# Patient Record
Sex: FEMALE | Race: WHITE | NOT HISPANIC OR LATINO | Employment: OTHER | URBAN - METROPOLITAN AREA
[De-identification: names, ages, dates, MRNs, and addresses within clinical notes are randomized per-mention and may not be internally consistent; named-entity substitution may affect disease eponyms.]

---

## 2017-10-03 ENCOUNTER — OFFICE VISIT (OUTPATIENT)
Dept: FAMILY MEDICINE | Facility: CLINIC | Age: 67
End: 2017-10-03
Payer: MEDICARE

## 2017-10-03 VITALS
DIASTOLIC BLOOD PRESSURE: 80 MMHG | BODY MASS INDEX: 26.45 KG/M2 | SYSTOLIC BLOOD PRESSURE: 108 MMHG | HEART RATE: 78 BPM | TEMPERATURE: 98 F | WEIGHT: 149.25 LBS | HEIGHT: 63 IN | OXYGEN SATURATION: 97 %

## 2017-10-03 DIAGNOSIS — E78.00 PURE HYPERCHOLESTEROLEMIA: ICD-10-CM

## 2017-10-03 DIAGNOSIS — M19.90 ARTHRITIS: ICD-10-CM

## 2017-10-03 DIAGNOSIS — Z12.11 COLON CANCER SCREENING: ICD-10-CM

## 2017-10-03 DIAGNOSIS — K21.00 GERD WITH ESOPHAGITIS: ICD-10-CM

## 2017-10-03 DIAGNOSIS — J30.2 CHRONIC SEASONAL ALLERGIC RHINITIS DUE TO OTHER ALLERGEN: ICD-10-CM

## 2017-10-03 DIAGNOSIS — E55.9 VITAMIN D DEFICIENCY: ICD-10-CM

## 2017-10-03 DIAGNOSIS — E05.90 HYPERTHYROIDISM: Primary | ICD-10-CM

## 2017-10-03 DIAGNOSIS — Z12.31 ENCOUNTER FOR SCREENING MAMMOGRAM FOR BREAST CANCER: ICD-10-CM

## 2017-10-03 PROCEDURE — 99999 PR PBB SHADOW E&M-NEW PATIENT-LVL III: CPT | Mod: PBBFAC,,, | Performed by: FAMILY MEDICINE

## 2017-10-03 PROCEDURE — 99203 OFFICE O/P NEW LOW 30 MIN: CPT | Mod: S$GLB,,, | Performed by: FAMILY MEDICINE

## 2017-10-03 RX ORDER — PANTOPRAZOLE SODIUM 40 MG/1
40 TABLET, DELAYED RELEASE ORAL DAILY
Qty: 90 TABLET | Refills: 4 | Status: ON HOLD | OUTPATIENT
Start: 2017-10-03 | End: 2018-05-04

## 2017-10-03 RX ORDER — PANTOPRAZOLE SODIUM 40 MG/1
40 TABLET, DELAYED RELEASE ORAL DAILY
COMMUNITY
End: 2017-10-03 | Stop reason: SDUPTHER

## 2017-10-03 RX ORDER — AMOXICILLIN AND CLAVULANATE POTASSIUM 875; 125 MG/1; MG/1
TABLET, FILM COATED ORAL
COMMUNITY
Start: 2017-09-05 | End: 2017-10-03

## 2017-10-03 RX ORDER — FLUTICASONE PROPIONATE 50 MCG
SPRAY, SUSPENSION (ML) NASAL
COMMUNITY
Start: 2017-09-05 | End: 2017-10-03

## 2017-10-03 RX ORDER — CALCIUM CARBONATE/VITAMIN D3 500-10/5ML
1 LIQUID (ML) ORAL ONCE
COMMUNITY

## 2017-10-03 RX ORDER — METHIMAZOLE 10 MG/1
5 TABLET ORAL DAILY
COMMUNITY
Start: 2017-09-28 | End: 2017-11-29 | Stop reason: SDUPTHER

## 2017-10-03 RX ORDER — CLOTRIMAZOLE AND BETAMETHASONE DIPROPIONATE 10; .64 MG/G; MG/G
CREAM TOPICAL
COMMUNITY
Start: 2017-09-23 | End: 2017-10-03

## 2017-10-03 RX ORDER — LOVASTATIN 40 MG/1
TABLET ORAL
COMMUNITY
Start: 2017-09-23 | End: 2018-03-20 | Stop reason: SDUPTHER

## 2017-10-03 NOTE — ASSESSMENT & PLAN NOTE
Pt is currently stable on medication regimen.  Continue current therapy as scheduled.  Contact office with any questions about adjustments on medications.   -  Will stay with me to monitor this condition.   -   Previously saw Dr. Mendez

## 2017-10-03 NOTE — PROGRESS NOTES
Office Visit    Patient Name: Meera Britt    : 1950  MRN: [id number]    Subjective:  Meera is a 67 y.o. female who presents today for:    Establish Care and Gastroesophageal Reflux      This patient has multiple medical diagnoses as noted below.  This patient is known to me and to this clinic. She recently completed a Nissen fundoplication in 2016.  She has noted that she has increased green sputum in the back of throat.  She notice this occurred several months ago.  She has increase gas production.  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  She report a foul odor.    We reviewed her past medical history.  Patient denies any new symptoms including chest pain, SOB, blurry vision, N/V, diarrhea.        Patient Active Problem List   Diagnosis    Seasonal allergies    Arthritis    GERD with esophagitis    Hyperthyroidism    Pure hypercholesterolemia    Vitamin D deficiency       Past Surgical History:   Procedure Laterality Date    HYSTERECTOMY      TONSILLECTOMY         History reviewed. No pertinent family history.    Social History     Social History    Marital status: Unknown     Spouse name: N/A    Number of children: N/A    Years of education: N/A     Occupational History    Not on file.     Social History Main Topics    Smoking status: Never Smoker    Smokeless tobacco: Never Used    Alcohol use No    Drug use: Unknown    Sexual activity: Not on file     Other Topics Concern    Not on file     Social History Narrative    No narrative on file       Current Medications  Medications reviewed and updated.     Allergies   Review of patient's allergies indicates:   Allergen Reactions    Iodine and iodide containing products      bloating         Labs  No results found for: LABA1C, HGBA1C  No results found for: NA, K, CL, CO2, BUN, CREATININE, CALCIUM, ANIONGAP, ESTGFRAFRICA, EGFRNONAA  No results found for: CHOL  No results found for: HDL  No results found for: LDLCALC  No results  "found for: TRIG  No results found for: CHOLHDL  Last set of blood work has been reviewed as noted above.    Review of Systems   Constitutional: Negative for activity change, appetite change, fatigue, fever and unexpected weight change.   HENT: Negative.  Negative for ear discharge, ear pain, rhinorrhea and sore throat.    Eyes: Negative.    Respiratory: Negative for apnea, cough, chest tightness, shortness of breath and wheezing.    Cardiovascular: Negative for chest pain, palpitations and leg swelling.   Gastrointestinal: Negative for abdominal distention, abdominal pain, constipation, diarrhea and vomiting.        Bloating and increased flatulance    Endocrine: Negative for cold intolerance, heat intolerance, polydipsia and polyuria.   Genitourinary: Negative for decreased urine volume, menstrual problem, urgency, vaginal bleeding, vaginal discharge and vaginal pain.   Musculoskeletal: Negative.    Skin: Negative for rash.   Neurological: Negative for dizziness and headaches.   Hematological: Does not bruise/bleed easily.   Psychiatric/Behavioral: Negative for agitation, sleep disturbance and suicidal ideas.       /80 (BP Location: Left arm, Patient Position: Sitting, BP Method: Large (Manual))   Pulse 78   Temp 98.3 °F (36.8 °C) (Oral)   Ht 5' 3" (1.6 m)   Wt 67.7 kg (149 lb 4 oz)   SpO2 97%   BMI 26.44 kg/m²      Physical Exam   Constitutional: She is oriented to person, place, and time. She appears well-developed and well-nourished.   HENT:   Head: Normocephalic.   Right Ear: External ear normal.   Left Ear: External ear normal.   Nose: Nose normal.   Mouth/Throat: Oropharynx is clear and moist.   Eyes: Conjunctivae and EOM are normal. Pupils are equal, round, and reactive to light.   Cardiovascular: Normal rate, regular rhythm and normal heart sounds.    Pulmonary/Chest: Effort normal and breath sounds normal.   Neurological: She is alert and oriented to person, place, and time.   Skin: Skin is " warm and dry.   Vitals reviewed.      Health Maintenance  Health Maintenance    Patient has no pending health maintenance at this time         Assessment/Plan:  Meera Britt is a 67 y.o. female who presents today for :    1. Hyperthyroidism    2. Chronic seasonal allergic rhinitis due to other allergen    3. Arthritis    4. GERD with esophagitis    5. Pure hypercholesterolemia    6. Colon cancer screening    7. Encounter for screening mammogram for breast cancer    8. Vitamin D deficiency        Problem List Items Addressed This Visit        Unprioritized    Arthritis    Current Assessment & Plan     Patient is stable.  Assess and addressed all modifiable risk factors.  Continue with appropriate management to prevent complications.           GERD with esophagitis    Overview     Nissen fundoplication   -  Will follow up with [facility].          Relevant Medications    pantoprazole (PROTONIX) 40 MG tablet    Hyperthyroidism - Primary    Current Assessment & Plan     Pt is currently stable on medication regimen.  Continue current therapy as scheduled.  Contact office with any questions about adjustments on medications.   -  Will stay with me to monitor this condition.   -   Previously saw Dr. Mendez         Relevant Orders    CBC auto differential    Comprehensive metabolic panel    Lipid panel    Hemoglobin A1c    TSH    Vitamin D    TSH    T4, free    Pure hypercholesterolemia    Relevant Orders    CBC auto differential    Comprehensive metabolic panel    Lipid panel    Hemoglobin A1c    TSH    Seasonal allergies    Current Assessment & Plan     Patient is stable.  Assess and addressed all modifiable risk factors.  Continue with appropriate management to prevent complications.  -  gerd contributing to her symptoms.          Vitamin D deficiency    Overview     Cannot tolerate prescription dosage of medication         Current Assessment & Plan     Continue with OTC vitamin d         Relevant Orders     Vitamin D      Other Visit Diagnoses     Colon cancer screening        Relevant Orders    Case request GI: COLONOSCOPY (Completed)    Encounter for screening mammogram for breast cancer        Relevant Orders    Mammo Digital Screening Bilat with CAD          Return in about 6 months (around 4/3/2018).     This note was created by combination of typed  and Dragon dictation.  Transcription errors may be present.  If there are any questions, please contact me.

## 2017-10-03 NOTE — ASSESSMENT & PLAN NOTE
Patient is stable.  Assess and addressed all modifiable risk factors.  Continue with appropriate management to prevent complications.  -  gerd contributing to her symptoms.

## 2017-10-04 ENCOUNTER — HOSPITAL ENCOUNTER (OUTPATIENT)
Dept: RADIOLOGY | Facility: HOSPITAL | Age: 67
Discharge: HOME | End: 2017-10-04
Attending: FAMILY MEDICINE
Payer: MEDICARE

## 2017-10-04 DIAGNOSIS — Z12.31 ENCOUNTER FOR SCREENING MAMMOGRAM FOR BREAST CANCER: ICD-10-CM

## 2017-10-04 PROCEDURE — 77067 SCR MAMMO BI INCL CAD: CPT | Mod: 26,,, | Performed by: RADIOLOGY

## 2017-10-04 PROCEDURE — 77063 BREAST TOMOSYNTHESIS BI: CPT | Mod: 26,,, | Performed by: RADIOLOGY

## 2017-10-04 PROCEDURE — 77067 SCR MAMMO BI INCL CAD: CPT | Mod: TC

## 2017-10-09 ENCOUNTER — TELEPHONE (OUTPATIENT)
Dept: FAMILY MEDICINE | Facility: CLINIC | Age: 67
End: 2017-10-09

## 2017-10-09 NOTE — TELEPHONE ENCOUNTER
----- Message from Loretta Antoine MD sent at 10/9/2017  2:59 PM CDT -----  Your cholesterol was elevated. I recommend that you follow a low fat diet and exercise regularly.

## 2017-10-10 DIAGNOSIS — Z12.11 COLON CANCER SCREENING: Primary | ICD-10-CM

## 2017-10-25 ENCOUNTER — ANESTHESIA EVENT (OUTPATIENT)
Dept: ENDOSCOPY | Facility: HOSPITAL | Age: 67
End: 2017-10-25
Payer: MEDICARE

## 2017-10-26 ENCOUNTER — HOSPITAL ENCOUNTER (OUTPATIENT)
Facility: HOSPITAL | Age: 67
Discharge: HOME | End: 2017-10-26
Attending: INTERNAL MEDICINE | Admitting: INTERNAL MEDICINE
Payer: MEDICARE

## 2017-10-26 ENCOUNTER — ANESTHESIA (OUTPATIENT)
Dept: ENDOSCOPY | Facility: HOSPITAL | Age: 67
End: 2017-10-26
Payer: MEDICARE

## 2017-10-26 VITALS
OXYGEN SATURATION: 99 % | DIASTOLIC BLOOD PRESSURE: 65 MMHG | BODY MASS INDEX: 27.05 KG/M2 | SYSTOLIC BLOOD PRESSURE: 138 MMHG | RESPIRATION RATE: 18 BRPM | HEIGHT: 62 IN | TEMPERATURE: 98 F | WEIGHT: 147 LBS | HEART RATE: 61 BPM

## 2017-10-26 DIAGNOSIS — Z12.11 SCREENING FOR COLON CANCER: ICD-10-CM

## 2017-10-26 PROCEDURE — 27201089 HC SNARE, DISP (ANY): Performed by: INTERNAL MEDICINE

## 2017-10-26 PROCEDURE — 45385 COLONOSCOPY W/LESION REMOVAL: CPT | Performed by: INTERNAL MEDICINE

## 2017-10-26 PROCEDURE — 88305 TISSUE EXAM BY PATHOLOGIST: CPT | Mod: 26,,, | Performed by: PATHOLOGY

## 2017-10-26 PROCEDURE — 37000008 HC ANESTHESIA 1ST 15 MINUTES: Performed by: INTERNAL MEDICINE

## 2017-10-26 PROCEDURE — D9220A PRA ANESTHESIA: Mod: PT,ANES,, | Performed by: ANESTHESIOLOGY

## 2017-10-26 PROCEDURE — 63600175 PHARM REV CODE 636 W HCPCS: Performed by: NURSE ANESTHETIST, CERTIFIED REGISTERED

## 2017-10-26 PROCEDURE — 25000003 PHARM REV CODE 250: Performed by: ANESTHESIOLOGY

## 2017-10-26 PROCEDURE — 88305 TISSUE EXAM BY PATHOLOGIST: CPT | Performed by: PATHOLOGY

## 2017-10-26 PROCEDURE — D9220A PRA ANESTHESIA: Mod: PT,CRNA,, | Performed by: NURSE ANESTHETIST, CERTIFIED REGISTERED

## 2017-10-26 PROCEDURE — 37000009 HC ANESTHESIA EA ADD 15 MINS: Performed by: INTERNAL MEDICINE

## 2017-10-26 RX ORDER — SODIUM CHLORIDE 9 MG/ML
INJECTION, SOLUTION INTRAVENOUS CONTINUOUS
Status: DISCONTINUED | OUTPATIENT
Start: 2017-10-26 | End: 2017-10-26 | Stop reason: HOSPADM

## 2017-10-26 RX ORDER — LIDOCAINE HYDROCHLORIDE 20 MG/ML
INJECTION, SOLUTION EPIDURAL; INFILTRATION; INTRACAUDAL; PERINEURAL
Status: DISCONTINUED
Start: 2017-10-26 | End: 2017-10-26 | Stop reason: HOSPADM

## 2017-10-26 RX ORDER — LIDOCAINE HYDROCHLORIDE 10 MG/ML
1 INJECTION, SOLUTION EPIDURAL; INFILTRATION; INTRACAUDAL; PERINEURAL ONCE
Status: DISCONTINUED | OUTPATIENT
Start: 2017-10-26 | End: 2017-10-26 | Stop reason: HOSPADM

## 2017-10-26 RX ORDER — PROPOFOL 10 MG/ML
VIAL (ML) INTRAVENOUS
Status: COMPLETED
Start: 2017-10-26 | End: 2017-10-26

## 2017-10-26 RX ORDER — LIDOCAINE HCL/PF 100 MG/5ML
SYRINGE (ML) INTRAVENOUS
Status: DISCONTINUED | OUTPATIENT
Start: 2017-10-26 | End: 2017-10-26

## 2017-10-26 RX ORDER — PROPOFOL 10 MG/ML
VIAL (ML) INTRAVENOUS
Status: DISCONTINUED | OUTPATIENT
Start: 2017-10-26 | End: 2017-10-26

## 2017-10-26 RX ADMIN — PROPOFOL 40 MG: 10 INJECTION, EMULSION INTRAVENOUS at 01:10

## 2017-10-26 RX ADMIN — PROPOFOL 50 MG: 10 INJECTION, EMULSION INTRAVENOUS at 01:10

## 2017-10-26 RX ADMIN — PROPOFOL 60 MG: 10 INJECTION, EMULSION INTRAVENOUS at 12:10

## 2017-10-26 RX ADMIN — SODIUM CHLORIDE: 0.9 INJECTION, SOLUTION INTRAVENOUS at 10:10

## 2017-10-26 RX ADMIN — PROPOFOL 30 MG: 10 INJECTION, EMULSION INTRAVENOUS at 01:10

## 2017-10-26 RX ADMIN — LIDOCAINE HYDROCHLORIDE 60 MG: 20 INJECTION, SOLUTION INTRAVENOUS at 12:10

## 2017-10-26 NOTE — ANESTHESIA PREPROCEDURE EVALUATION
10/26/2017  Meera Britt is a 67 y.o., female.    Anesthesia Evaluation    I have reviewed the Patient Summary Reports.     I have reviewed the Medications.     Review of Systems  Anesthesia Hx:  No problems with previous Anesthesia   Denies Personal Hx of Anesthesia complications.   Hematology/Oncology:  Hematology Normal   Oncology Normal     EENT/Dental:EENT/Dental Normal   Cardiovascular:  Cardiovascular Normal     Pulmonary:  Pulmonary Normal    Renal/:  Renal/ Normal     Hepatic/GI:   GERD    Musculoskeletal:  Musculoskeletal Normal    Neurological:  Neurology Normal    Endocrine:   Hyperthyroidism    Dermatological:  Skin Normal    Psych:  Psychiatric Normal           Physical Exam  General:  Well nourished    Airway/Jaw/Neck:  Airway Findings: Mouth Opening: Normal Tongue: Normal  Mallampati: II      Dental:  Dental Findings: In tact   Chest/Lungs:  Chest/Lungs Clear    Heart/Vascular:  Heart Findings: Normal Heart murmur: negative       Mental Status:  Mental Status Findings:  Cooperative, Alert and Oriented         Anesthesia Plan  Type of Anesthesia, risks & benefits discussed:  Anesthesia Type:  general, MAC, regional  Patient's Preference:   Intra-op Monitoring Plan: standard ASA monitors  Intra-op Monitoring Plan Comments:   Post Op Pain Control Plan: multimodal analgesia  Post Op Pain Control Plan Comments:   Induction:   IV  Beta Blocker:  Patient is not currently on a Beta-Blocker (No further documentation required).       Informed Consent: Patient understands risks and agrees with Anesthesia plan.  Questions answered. Anesthesia consent signed with patient.  ASA Score: 2     Day of Surgery Review of History & Physical:            Ready For Surgery From Anesthesia Perspective.

## 2017-10-26 NOTE — TRANSFER OF CARE
"Anesthesia Transfer of Care Note    Patient: Meear Britt    Procedure(s) Performed: Procedure(s) (LRB):  COLONOSCOPY (N/A)    Patient location: GI    Anesthesia Type: general    Transport from OR: Transported from OR on room air with adequate spontaneous ventilation    Post pain: adequate analgesia    Post assessment: no apparent anesthetic complications and tolerated procedure well    Post vital signs: stable    Level of consciousness: awake, alert and oriented    Nausea/Vomiting: no nausea/vomiting    Complications: none    Transfer of care protocol was followed      Last vitals:   Visit Vitals  /63 (BP Location: Left arm, Patient Position: Lying)   Pulse 67   Temp 36.5 °C (97.7 °F) (Oral)   Resp 18   Ht 5' 2" (1.575 m)   Wt 66.7 kg (147 lb)   SpO2 99%   Breastfeeding? No   BMI 26.89 kg/m²     "

## 2017-10-26 NOTE — H&P (VIEW-ONLY)
Office Visit    Patient Name: Meera Britt    : 1950  MRN: [id number]    Subjective:  Meera is a 67 y.o. female who presents today for:    Establish Care and Gastroesophageal Reflux      This patient has multiple medical diagnoses as noted below.  This patient is known to me and to this clinic. She recently completed a Nissen fundoplication in 2016.  She has noted that she has increased green sputum in the back of throat.  She notice this occurred several months ago.  She has increase gas production.  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  She report a foul odor.    We reviewed her past medical history.  Patient denies any new symptoms including chest pain, SOB, blurry vision, N/V, diarrhea.        Patient Active Problem List   Diagnosis    Seasonal allergies    Arthritis    GERD with esophagitis    Hyperthyroidism    Pure hypercholesterolemia    Vitamin D deficiency       Past Surgical History:   Procedure Laterality Date    HYSTERECTOMY      TONSILLECTOMY         History reviewed. No pertinent family history.    Social History     Social History    Marital status: Unknown     Spouse name: N/A    Number of children: N/A    Years of education: N/A     Occupational History    Not on file.     Social History Main Topics    Smoking status: Never Smoker    Smokeless tobacco: Never Used    Alcohol use No    Drug use: Unknown    Sexual activity: Not on file     Other Topics Concern    Not on file     Social History Narrative    No narrative on file       Current Medications  Medications reviewed and updated.     Allergies   Review of patient's allergies indicates:   Allergen Reactions    Iodine and iodide containing products      bloating         Labs  No results found for: LABA1C, HGBA1C  No results found for: NA, K, CL, CO2, BUN, CREATININE, CALCIUM, ANIONGAP, ESTGFRAFRICA, EGFRNONAA  No results found for: CHOL  No results found for: HDL  No results found for: LDLCALC  No results  "found for: TRIG  No results found for: CHOLHDL  Last set of blood work has been reviewed as noted above.    Review of Systems   Constitutional: Negative for activity change, appetite change, fatigue, fever and unexpected weight change.   HENT: Negative.  Negative for ear discharge, ear pain, rhinorrhea and sore throat.    Eyes: Negative.    Respiratory: Negative for apnea, cough, chest tightness, shortness of breath and wheezing.    Cardiovascular: Negative for chest pain, palpitations and leg swelling.   Gastrointestinal: Negative for abdominal distention, abdominal pain, constipation, diarrhea and vomiting.        Bloating and increased flatulance    Endocrine: Negative for cold intolerance, heat intolerance, polydipsia and polyuria.   Genitourinary: Negative for decreased urine volume, menstrual problem, urgency, vaginal bleeding, vaginal discharge and vaginal pain.   Musculoskeletal: Negative.    Skin: Negative for rash.   Neurological: Negative for dizziness and headaches.   Hematological: Does not bruise/bleed easily.   Psychiatric/Behavioral: Negative for agitation, sleep disturbance and suicidal ideas.       /80 (BP Location: Left arm, Patient Position: Sitting, BP Method: Large (Manual))   Pulse 78   Temp 98.3 °F (36.8 °C) (Oral)   Ht 5' 3" (1.6 m)   Wt 67.7 kg (149 lb 4 oz)   SpO2 97%   BMI 26.44 kg/m²      Physical Exam   Constitutional: She is oriented to person, place, and time. She appears well-developed and well-nourished.   HENT:   Head: Normocephalic.   Right Ear: External ear normal.   Left Ear: External ear normal.   Nose: Nose normal.   Mouth/Throat: Oropharynx is clear and moist.   Eyes: Conjunctivae and EOM are normal. Pupils are equal, round, and reactive to light.   Cardiovascular: Normal rate, regular rhythm and normal heart sounds.    Pulmonary/Chest: Effort normal and breath sounds normal.   Neurological: She is alert and oriented to person, place, and time.   Skin: Skin is " warm and dry.   Vitals reviewed.      Health Maintenance  Health Maintenance    Patient has no pending health maintenance at this time         Assessment/Plan:  Meera Britt is a 67 y.o. female who presents today for :    1. Hyperthyroidism    2. Chronic seasonal allergic rhinitis due to other allergen    3. Arthritis    4. GERD with esophagitis    5. Pure hypercholesterolemia    6. Colon cancer screening    7. Encounter for screening mammogram for breast cancer    8. Vitamin D deficiency        Problem List Items Addressed This Visit        Unprioritized    Arthritis    Current Assessment & Plan     Patient is stable.  Assess and addressed all modifiable risk factors.  Continue with appropriate management to prevent complications.           GERD with esophagitis    Overview     Nissen fundoplication   -  Will follow up with [facility].          Relevant Medications    pantoprazole (PROTONIX) 40 MG tablet    Hyperthyroidism - Primary    Current Assessment & Plan     Pt is currently stable on medication regimen.  Continue current therapy as scheduled.  Contact office with any questions about adjustments on medications.   -  Will stay with me to monitor this condition.   -   Previously saw Dr. Mendez         Relevant Orders    CBC auto differential    Comprehensive metabolic panel    Lipid panel    Hemoglobin A1c    TSH    Vitamin D    TSH    T4, free    Pure hypercholesterolemia    Relevant Orders    CBC auto differential    Comprehensive metabolic panel    Lipid panel    Hemoglobin A1c    TSH    Seasonal allergies    Current Assessment & Plan     Patient is stable.  Assess and addressed all modifiable risk factors.  Continue with appropriate management to prevent complications.  -  gerd contributing to her symptoms.          Vitamin D deficiency    Overview     Cannot tolerate prescription dosage of medication         Current Assessment & Plan     Continue with OTC vitamin d         Relevant Orders     Vitamin D      Other Visit Diagnoses     Colon cancer screening        Relevant Orders    Case request GI: COLONOSCOPY (Completed)    Encounter for screening mammogram for breast cancer        Relevant Orders    Mammo Digital Screening Bilat with CAD          Return in about 6 months (around 4/3/2018).     This note was created by combination of typed  and Dragon dictation.  Transcription errors may be present.  If there are any questions, please contact me.

## 2017-10-26 NOTE — ANESTHESIA POSTPROCEDURE EVALUATION
"Anesthesia Post Evaluation    Patient: Meera Britt    Procedure(s) Performed: Procedure(s) (LRB):  COLONOSCOPY (N/A)    Final Anesthesia Type: general  Patient location during evaluation: PACU  Patient participation: Yes- Able to Participate  Level of consciousness: awake and alert, oriented and awake  Post-procedure vital signs: reviewed and stable  Pain management: adequate  Airway patency: patent  PONV status at discharge: No PONV  Anesthetic complications: no      Cardiovascular status: blood pressure returned to baseline  Respiratory status: unassisted and spontaneous ventilation  Hydration status: euvolemic  Follow-up not needed.        Visit Vitals  /63 (BP Location: Left arm, Patient Position: Lying)   Pulse 67   Temp 36.5 °C (97.7 °F) (Oral)   Resp 18   Ht 5' 2" (1.575 m)   Wt 66.7 kg (147 lb)   SpO2 99%   Breastfeeding? No   BMI 26.89 kg/m²       Pain/Hiral Score: Pain Assessment Performed: Yes (10/26/2017  1:19 PM)  Presence of Pain: denies (10/26/2017  1:19 PM)  Hiral Score: 10 (10/26/2017  1:18 PM)      "

## 2017-11-29 RX ORDER — METHIMAZOLE 5 MG/1
5 TABLET ORAL DAILY
Qty: 90 TABLET | Refills: 0 | Status: SHIPPED | OUTPATIENT
Start: 2017-11-29 | End: 2018-03-20 | Stop reason: SDUPTHER

## 2017-11-29 NOTE — TELEPHONE ENCOUNTER
----- Message from Vasiliy Noble sent at 11/29/2017 11:41 AM CST -----  Contact: [phone]  Calling to get refill on methimazole. Reshma Jacobsjordi [phone]

## 2018-01-03 ENCOUNTER — OFFICE VISIT (OUTPATIENT)
Dept: FAMILY MEDICINE | Facility: CLINIC | Age: 68
End: 2018-01-03
Payer: MEDICARE

## 2018-01-03 VITALS
HEIGHT: 62 IN | OXYGEN SATURATION: 97 % | DIASTOLIC BLOOD PRESSURE: 88 MMHG | WEIGHT: 150.25 LBS | SYSTOLIC BLOOD PRESSURE: 118 MMHG | HEART RATE: 77 BPM | BODY MASS INDEX: 27.65 KG/M2 | TEMPERATURE: 98 F

## 2018-01-03 DIAGNOSIS — R05.9 COUGH: ICD-10-CM

## 2018-01-03 DIAGNOSIS — Z23 FLU VACCINE NEED: ICD-10-CM

## 2018-01-03 DIAGNOSIS — Z09 NEED FOR IMMUNIZATION FOLLOW-UP: ICD-10-CM

## 2018-01-03 DIAGNOSIS — Z23 NEED FOR VACCINATION WITH 13-POLYVALENT PNEUMOCOCCAL CONJUGATE VACCINE: ICD-10-CM

## 2018-01-03 DIAGNOSIS — R09.82 POST-NASAL DRIP: ICD-10-CM

## 2018-01-03 DIAGNOSIS — J01.90 ACUTE RHINOSINUSITIS: Primary | ICD-10-CM

## 2018-01-03 PROCEDURE — G0008 ADMIN INFLUENZA VIRUS VAC: HCPCS | Mod: S$GLB,,, | Performed by: FAMILY MEDICINE

## 2018-01-03 PROCEDURE — 99999 PR PBB SHADOW E&M-EST. PATIENT-LVL IV: CPT | Mod: PBBFAC,,, | Performed by: NURSE PRACTITIONER

## 2018-01-03 PROCEDURE — 99214 OFFICE O/P EST MOD 30 MIN: CPT | Mod: S$GLB,,, | Performed by: NURSE PRACTITIONER

## 2018-01-03 PROCEDURE — G0009 ADMIN PNEUMOCOCCAL VACCINE: HCPCS | Mod: S$GLB,,, | Performed by: FAMILY MEDICINE

## 2018-01-03 PROCEDURE — 90662 IIV NO PRSV INCREASED AG IM: CPT | Mod: S$GLB,,, | Performed by: FAMILY MEDICINE

## 2018-01-03 PROCEDURE — 90670 PCV13 VACCINE IM: CPT | Mod: S$GLB,,, | Performed by: FAMILY MEDICINE

## 2018-01-03 RX ORDER — PROMETHAZINE HYDROCHLORIDE AND DEXTROMETHORPHAN HYDROBROMIDE 6.25; 15 MG/5ML; MG/5ML
5 SYRUP ORAL NIGHTLY
Qty: 180 ML | Refills: 0 | Status: SHIPPED | OUTPATIENT
Start: 2018-01-03 | End: 2018-01-13

## 2018-01-03 RX ORDER — LEVOCETIRIZINE DIHYDROCHLORIDE 5 MG/1
5 TABLET, FILM COATED ORAL NIGHTLY
Qty: 30 TABLET | Refills: 0 | Status: SHIPPED | OUTPATIENT
Start: 2018-01-03 | End: 2019-10-16

## 2018-01-03 RX ORDER — FLUTICASONE PROPIONATE 50 MCG
1 SPRAY, SUSPENSION (ML) NASAL DAILY
Qty: 1 BOTTLE | Refills: 0 | Status: SHIPPED | OUTPATIENT
Start: 2018-01-03 | End: 2019-10-16

## 2018-01-03 NOTE — PATIENT INSTRUCTIONS

## 2018-01-03 NOTE — PROGRESS NOTES
History of Present Illness   Meera Britt is a 67 y.o. woman with medical history as listed below who presents today for evaluation of URI symptoms for four days. She complains of nasal congestion with post nasal drip and a scratchy throat. She also has a productive cough, sinus pressure, and headaches. She has no fevers, chills, or body aches. She has tried Vicks cold and flu which is helping to manage symptoms. She has no additional complaints and is otherwise healthy on today's visit.      Past Medical History:   Diagnosis Date    Allergy     Arthritis     Endometrial cancer 1980    GERD (gastroesophageal reflux disease)     Hyperlipidemia     Thyroid disease        Past Surgical History:   Procedure Laterality Date    COLONOSCOPY N/A 10/26/2017    Procedure: COLONOSCOPY;  Surgeon: Heladio Gonzalez MD;  Location: [facility];  Service: Endoscopy;  Laterality: N/A;    HYSTERECTOMY  1980    OOPHORECTOMY      TONSILLECTOMY         Social History     Social History    Marital status:      Spouse name: N/A    Number of children: N/A    Years of education: N/A     Social History Main Topics    Smoking status: Never Smoker    Smokeless tobacco: Never Used    Alcohol use No    Drug use: Unknown    Sexual activity: Not Asked     Other Topics Concern    None     Social History Narrative    None       History reviewed. No pertinent family history.    Review of Systems  Review of Systems   Constitutional: Negative for chills, fever and malaise/fatigue.   HENT: Positive for congestion, sinus pain and sore throat. Negative for ear discharge and ear pain.    Eyes: Negative for discharge and redness.   Respiratory: Positive for cough. Negative for sputum production, shortness of breath and wheezing.    Cardiovascular: Negative for chest pain.   Gastrointestinal: Negative for nausea and vomiting.   Neurological: Positive for headaches.     A complete review of systems was otherwise  "negative.    Physical Exam  /88 (BP Location: Right arm, Patient Position: Sitting, BP Method: Medium (Manual))   Pulse 77   Temp 98.2 °F (36.8 °C) (Oral)   Ht 5' 2" (1.575 m)   Wt 68.2 kg (150 lb 3.9 oz)   SpO2 97%   BMI 27.48 kg/m²   General appearance: alert, appears stated age, cooperative, no distress and ill appearing  Eyes: negative findings: lids and lashes normal and conjunctivae and sclerae normal  Ears: normal TM's and external ear canals both ears  Nose: clear discharge, moderate congestion, turbinates red, swollen, sinus tenderness bilateral  Throat: lips, mucosa, and tongue normal; teeth and gums normal and moderate oropharyngeal erythema and edema with clear post nasal drainage  Lungs: clear to auscultation bilaterally  Heart: regular rate and rhythm, S1, S2 normal, no murmur, click, rub or gallop  Lymph nodes: Cervical, supraclavicular, and axillary nodes normal.  Neurologic: Grossly normal    Assessment/Plan  Acute rhinosinusitis  Viral, antibiotics not indicated.  Xyzal, Flonase, and PRN cough medication.  Rest and increase fluid intake.  Ibuprofen as needed.  RTC PRN for persistent or worsening symptoms.  -     levocetirizine (XYZAL) 5 MG tablet; Take 1 tablet (5 mg total) by mouth every evening.  Dispense: 30 tablet; Refill: 0  -     fluticasone (FLONASE) 50 mcg/actuation nasal spray; 1 spray by Each Nare route once daily.  Dispense: 1 Bottle; Refill: 0  -     promethazine-dextromethorphan (PROMETHAZINE-DM) 6.25-15 mg/5 mL Syrp; Take 5 mLs by mouth every evening.  Dispense: 180 mL; Refill: 0    Cough  As above.  -     levocetirizine (XYZAL) 5 MG tablet; Take 1 tablet (5 mg total) by mouth every evening.  Dispense: 30 tablet; Refill: 0  -     fluticasone (FLONASE) 50 mcg/actuation nasal spray; 1 spray by Each Nare route once daily.  Dispense: 1 Bottle; Refill: 0  -     promethazine-dextromethorphan (PROMETHAZINE-DM) 6.25-15 mg/5 mL Syrp; Take 5 mLs by mouth every evening.  Dispense: " 180 mL; Refill: 0    Post-nasal drip  As above.  -     levocetirizine (XYZAL) 5 MG tablet; Take 1 tablet (5 mg total) by mouth every evening.  Dispense: 30 tablet; Refill: 0  -     fluticasone (FLONASE) 50 mcg/actuation nasal spray; 1 spray by Each Nare route once daily.  Dispense: 1 Bottle; Refill: 0  -     promethazine-dextromethorphan (PROMETHAZINE-DM) 6.25-15 mg/5 mL Syrp; Take 5 mLs by mouth every evening.  Dispense: 180 mL; Refill: 0    Need for immunization follow-up  Administered today.  -     Influenza - High Dose (65+) (PF) (IM)  -     (In Office Administered) Pneumococcal Conjugate Vaccine (13 Valent) (IM)    She has verbalized understanding and is in agreement with plan of care.    Return if symptoms worsen or fail to improve.

## 2018-03-20 DIAGNOSIS — Z00.00 ROUTINE ADULT HEALTH MAINTENANCE: Primary | ICD-10-CM

## 2018-03-20 RX ORDER — LOVASTATIN 40 MG/1
40 TABLET ORAL NIGHTLY
Qty: 90 TABLET | Refills: 3 | Status: SHIPPED | OUTPATIENT
Start: 2018-03-20 | End: 2018-07-12

## 2018-03-20 RX ORDER — METHIMAZOLE 5 MG/1
5 TABLET ORAL DAILY
Qty: 90 TABLET | Refills: 1 | Status: SHIPPED | OUTPATIENT
Start: 2018-03-20 | End: 2018-07-12 | Stop reason: SDUPTHER

## 2018-03-20 NOTE — TELEPHONE ENCOUNTER
----- Message from Priscila Meyer sent at 3/20/2018 11:31 AM CDT -----  Contact: Self  Refill : lovastatin (MEVACOR) 40 MG tablet             methIMAzole (TAPAZOLE) 5 MG Tab      Pharmacy     [facility] - [place] ([place], [place] - [address] [facility]

## 2018-03-22 ENCOUNTER — LAB VISIT (OUTPATIENT)
Dept: LAB | Facility: HOSPITAL | Age: 68
End: 2018-03-22
Attending: FAMILY MEDICINE
Payer: MEDICARE

## 2018-03-22 DIAGNOSIS — Z00.00 ROUTINE ADULT HEALTH MAINTENANCE: ICD-10-CM

## 2018-03-22 LAB
25(OH)D3+25(OH)D2 SERPL-MCNC: 59 NG/ML
CHOLEST SERPL-MCNC: 244 MG/DL
CHOLEST/HDLC SERPL: 4.4 {RATIO}
HDLC SERPL-MCNC: 56 MG/DL
HDLC SERPL: 23 %
LDLC SERPL CALC-MCNC: 136.4 MG/DL
NONHDLC SERPL-MCNC: 188 MG/DL
T4 FREE SERPL-MCNC: 0.89 NG/DL
TRIGL SERPL-MCNC: 258 MG/DL
TSH SERPL DL<=0.005 MIU/L-ACNC: 3.29 UIU/ML

## 2018-03-22 PROCEDURE — 36415 COLL VENOUS BLD VENIPUNCTURE: CPT | Mod: PO

## 2018-03-22 PROCEDURE — 80061 LIPID PANEL: CPT

## 2018-03-22 PROCEDURE — 82306 VITAMIN D 25 HYDROXY: CPT

## 2018-03-22 PROCEDURE — 84443 ASSAY THYROID STIM HORMONE: CPT

## 2018-03-22 PROCEDURE — 84439 ASSAY OF FREE THYROXINE: CPT

## 2018-04-13 ENCOUNTER — OFFICE VISIT (OUTPATIENT)
Dept: FAMILY MEDICINE | Facility: CLINIC | Age: 68
End: 2018-04-13
Payer: MEDICARE

## 2018-04-13 VITALS
TEMPERATURE: 98 F | BODY MASS INDEX: 27.38 KG/M2 | HEIGHT: 62 IN | WEIGHT: 148.81 LBS | SYSTOLIC BLOOD PRESSURE: 110 MMHG | HEART RATE: 70 BPM | DIASTOLIC BLOOD PRESSURE: 70 MMHG | OXYGEN SATURATION: 98 %

## 2018-04-13 DIAGNOSIS — E05.90 HYPERTHYROIDISM: ICD-10-CM

## 2018-04-13 DIAGNOSIS — R07.89 OTHER CHEST PAIN: ICD-10-CM

## 2018-04-13 DIAGNOSIS — E78.00 PURE HYPERCHOLESTEROLEMIA: ICD-10-CM

## 2018-04-13 DIAGNOSIS — K21.00 GERD WITH ESOPHAGITIS: Primary | ICD-10-CM

## 2018-04-13 PROCEDURE — 99215 OFFICE O/P EST HI 40 MIN: CPT | Mod: S$GLB,,, | Performed by: FAMILY MEDICINE

## 2018-04-13 PROCEDURE — 99999 PR PBB SHADOW E&M-EST. PATIENT-LVL III: CPT | Mod: PBBFAC,,, | Performed by: FAMILY MEDICINE

## 2018-04-13 PROCEDURE — 93005 ELECTROCARDIOGRAM TRACING: CPT | Mod: S$GLB,,, | Performed by: FAMILY MEDICINE

## 2018-04-13 PROCEDURE — 93010 ELECTROCARDIOGRAM REPORT: CPT | Mod: S$GLB,,, | Performed by: INTERNAL MEDICINE

## 2018-04-13 RX ORDER — SUCRALFATE 1 G/1
1 TABLET ORAL 4 TIMES DAILY
Qty: 120 TABLET | Refills: 3 | Status: SHIPPED | OUTPATIENT
Start: 2018-04-13 | End: 2019-01-27

## 2018-04-13 NOTE — PROGRESS NOTES
Office Visit    Patient Name: Meera Britt    : 1950  MRN: [id number]    Subjective:  Meera is a 67 y.o. female who presents today for:    Gastroesophageal Reflux; Heartburn; and check right eye      This patient has multiple medical diagnoses as noted below.  This patient is known to me and to this clinic.  She reports severe heartburn that would not go away.  She has a family history of CAD.  She has worsening symptoms that are located in the epigastric region.  She has used Pantoprazole and ranitidine without relief.  She will have the discomfort all day long.  She recently has a younger brother have an MI. Her brother is about 13 years younger than her.  Her sister who is 9 years younger than her had an MI in 2017.  She current symptoms feel internal.  No pain will applied pressure.        Patient Active Problem List   Diagnosis    Seasonal allergies    Arthritis    GERD with esophagitis    Hyperthyroidism    Pure hypercholesterolemia    Vitamin D deficiency       Past Surgical History:   Procedure Laterality Date    COLONOSCOPY N/A 10/26/2017    Procedure: COLONOSCOPY;  Surgeon: Heladio Gonzalez MD;  Location: [facility];  Service: Endoscopy;  Laterality: N/A;    HYSTERECTOMY  1980    OOPHORECTOMY      TONSILLECTOMY         No family history on file.    Social History     Social History    Marital status:      Spouse name: N/A    Number of children: N/A    Years of education: N/A     Occupational History    Not on file.     Social History Main Topics    Smoking status: Never Smoker    Smokeless tobacco: Never Used    Alcohol use No    Drug use: Unknown    Sexual activity: Not on file     Other Topics Concern    Not on file     Social History Narrative    No narrative on file       Current Medications  Medications reviewed and updated.     Allergies   Review of patient's allergies indicates:   Allergen Reactions    Iodine and iodide containing products       bloating    Sulfa (sulfonamide antibiotics) Nausea And Vomiting         Labs  Lab Results   Component Value Date    HGBA1C 5.5 10/04/2017     Lab Results   Component Value Date     10/04/2017    K 4.8 10/04/2017     10/04/2017    CO2 28 10/04/2017    BUN 12 10/04/2017    CREATININE 0.8 10/04/2017    CALCIUM 10.0 10/04/2017    ANIONGAP 8 10/04/2017    ESTGFRAFRICA >60.0 10/04/2017    EGFRNONAA >60.0 10/04/2017     Lab Results   Component Value Date    CHOL 244 (H) 03/22/2018    CHOL 255 (H) 10/04/2017     Lab Results   Component Value Date    HDL 56 03/22/2018    HDL 59 10/04/2017     Lab Results   Component Value Date    LDLCALC 136.4 03/22/2018    LDLCALC 126.2 10/04/2017     Lab Results   Component Value Date    TRIG 258 (H) 03/22/2018    TRIG 349 (H) 10/04/2017     Lab Results   Component Value Date    CHOLHDL 23.0 03/22/2018    CHOLHDL 23.1 10/04/2017     Last set of blood work has been reviewed as noted above.    Review of Systems   Constitutional: Negative for activity change, appetite change, fatigue, fever and unexpected weight change.   HENT: Negative.  Negative for ear discharge, ear pain, rhinorrhea and sore throat.    Eyes: Negative.    Respiratory: Negative for apnea, cough, chest tightness, shortness of breath and wheezing.    Cardiovascular: Negative for chest pain, palpitations and leg swelling.   Gastrointestinal: Positive for abdominal pain. Negative for abdominal distention, constipation, diarrhea and vomiting.   Endocrine: Negative for cold intolerance, heat intolerance, polydipsia and polyuria.   Genitourinary: Negative for decreased urine volume, menstrual problem, urgency, vaginal bleeding, vaginal discharge and vaginal pain.   Musculoskeletal: Negative.    Skin: Negative for rash.   Neurological: Negative for dizziness and headaches.   Hematological: Does not bruise/bleed easily.   Psychiatric/Behavioral: Negative for agitation, sleep disturbance and suicidal ideas.       BP  "110/70 (BP Location: Left arm, Patient Position: Sitting, BP Method: Large (Manual))   Pulse 70   Temp 98.4 °F (36.9 °C) (Oral)   Ht 5' 2" (1.575 m)   Wt 67.5 kg (148 lb 13 oz)   SpO2 98%   BMI 27.22 kg/m²      Physical Exam   Constitutional: She is oriented to person, place, and time. She appears well-developed and well-nourished.   HENT:   Head: Normocephalic and atraumatic.   Right Ear: External ear normal.   Left Ear: External ear normal.   Nose: Nose normal.   Mouth/Throat: Oropharynx is clear and moist.   Eyes: Conjunctivae and EOM are normal. Pupils are equal, round, and reactive to light.   Neck: Normal range of motion. No JVD present. No thyromegaly present.   Cardiovascular: Normal rate, regular rhythm and normal heart sounds.    Pulmonary/Chest: Effort normal and breath sounds normal. She has no wheezes.   Abdominal: Soft. Bowel sounds are normal. She exhibits no distension. There is no tenderness.   Musculoskeletal: Normal range of motion.   Lymphadenopathy:     She has no cervical adenopathy.   Neurological: She is alert and oriented to person, place, and time. She has normal reflexes.   Skin: Skin is warm and dry.   Psychiatric: She has a normal mood and affect. Her behavior is normal.   Vitals reviewed.        EKG reviewed in the office.    Health Maintenance  Health Maintenance       Date Due Completion Date    Hepatitis C Screening 1950 ---    DEXA SCAN 09/12/1992 ---    Zoster Vaccine 09/12/2012 ---    Pneumococcal (65+) (2 of 2 - PPSV23) 01/03/2019 1/3/2018    Mammogram 10/04/2019 10/4/2017    TETANUS VACCINE 09/06/2022 9/6/2012    Lipid Panel 03/22/2023 3/22/2018    Colonoscopy 10/26/2027 10/26/2017          Assessment/Plan:  Meera Britt is a 67 y.o. female who presents today for :    1. GERD with esophagitis    2. Hyperthyroidism    3. Pure hypercholesterolemia    4. Other chest pain        Problem List Items Addressed This Visit        Unprioritized    GERD with esophagitis - " Primary    Overview     Nissen fundoplication   -  Will follow up with [facility].          Relevant Orders    Case request GI: ESOPHAGOGASTRODUODENOSCOPY (EGD) (Completed)    Hyperthyroidism  -   Continue with tapazole     Pure hypercholesterolemia    Relevant Orders    Lipid panel  -   Pt is currently stable on medication regimen.  Continue current therapy as scheduled.  Contact office with any questions about adjustments on medications.   -  Needs to continue to focus on diet changes   -  Consider addition of fenofibrate       Other Visit Diagnoses     Other chest pain        Relevant Orders    EKG 12-lead      ekg reviewed in office.  No significant changes noted.      Follow-up in about 6 weeks (around 5/25/2018).     This note was created by combination of typed  and Dragon dictation.  Transcription errors may be present.  If there are any questions, please contact me.

## 2018-05-04 ENCOUNTER — ANESTHESIA EVENT (OUTPATIENT)
Dept: ENDOSCOPY | Facility: HOSPITAL | Age: 68
End: 2018-05-04
Payer: MEDICARE

## 2018-05-04 ENCOUNTER — HOSPITAL ENCOUNTER (OUTPATIENT)
Facility: HOSPITAL | Age: 68
Discharge: HOME | End: 2018-05-04
Attending: INTERNAL MEDICINE | Admitting: INTERNAL MEDICINE
Payer: MEDICARE

## 2018-05-04 ENCOUNTER — SURGERY (OUTPATIENT)
Age: 68
End: 2018-05-04

## 2018-05-04 ENCOUNTER — ANESTHESIA (OUTPATIENT)
Dept: ENDOSCOPY | Facility: HOSPITAL | Age: 68
End: 2018-05-04
Payer: MEDICARE

## 2018-05-04 VITALS
BODY MASS INDEX: 27.23 KG/M2 | SYSTOLIC BLOOD PRESSURE: 132 MMHG | WEIGHT: 148 LBS | OXYGEN SATURATION: 98 % | TEMPERATURE: 98 F | HEIGHT: 62 IN | RESPIRATION RATE: 18 BRPM | HEART RATE: 66 BPM | DIASTOLIC BLOOD PRESSURE: 72 MMHG

## 2018-05-04 DIAGNOSIS — K21.00 GERD WITH ESOPHAGITIS: ICD-10-CM

## 2018-05-04 DIAGNOSIS — R12 HEARTBURN: ICD-10-CM

## 2018-05-04 PROCEDURE — 25000003 PHARM REV CODE 250: Performed by: INTERNAL MEDICINE

## 2018-05-04 PROCEDURE — 37000008 HC ANESTHESIA 1ST 15 MINUTES: Performed by: INTERNAL MEDICINE

## 2018-05-04 PROCEDURE — 88305 TISSUE EXAM BY PATHOLOGIST: CPT | Mod: 59 | Performed by: PATHOLOGY

## 2018-05-04 PROCEDURE — 43239 EGD BIOPSY SINGLE/MULTIPLE: CPT | Performed by: INTERNAL MEDICINE

## 2018-05-04 PROCEDURE — D9220A PRA ANESTHESIA: Mod: CRNA,,, | Performed by: NURSE ANESTHETIST, CERTIFIED REGISTERED

## 2018-05-04 PROCEDURE — 63600175 PHARM REV CODE 636 W HCPCS: Performed by: NURSE ANESTHETIST, CERTIFIED REGISTERED

## 2018-05-04 PROCEDURE — 37000009 HC ANESTHESIA EA ADD 15 MINS: Performed by: INTERNAL MEDICINE

## 2018-05-04 PROCEDURE — 88305 TISSUE EXAM BY PATHOLOGIST: CPT | Mod: 26,,, | Performed by: PATHOLOGY

## 2018-05-04 PROCEDURE — D9220A PRA ANESTHESIA: Mod: ANES,,, | Performed by: ANESTHESIOLOGY

## 2018-05-04 PROCEDURE — 27201012 HC FORCEPS, HOT/COLD, DISP: Performed by: INTERNAL MEDICINE

## 2018-05-04 RX ORDER — LIDOCAINE HYDROCHLORIDE 20 MG/ML
INJECTION, SOLUTION EPIDURAL; INFILTRATION; INTRACAUDAL; PERINEURAL
Status: DISCONTINUED
Start: 2018-05-04 | End: 2018-05-04 | Stop reason: HOSPADM

## 2018-05-04 RX ORDER — LIDOCAINE HCL/PF 100 MG/5ML
SYRINGE (ML) INTRAVENOUS
Status: DISCONTINUED | OUTPATIENT
Start: 2018-05-04 | End: 2018-05-04

## 2018-05-04 RX ORDER — PROPOFOL 10 MG/ML
VIAL (ML) INTRAVENOUS
Status: DISCONTINUED | OUTPATIENT
Start: 2018-05-04 | End: 2018-05-04

## 2018-05-04 RX ORDER — PROPOFOL 10 MG/ML
VIAL (ML) INTRAVENOUS
Status: COMPLETED
Start: 2018-05-04 | End: 2018-05-04

## 2018-05-04 RX ORDER — PANTOPRAZOLE SODIUM 40 MG/1
40 TABLET, DELAYED RELEASE ORAL 2 TIMES DAILY
Qty: 90 TABLET | Refills: 4 | Status: SHIPPED | OUTPATIENT
Start: 2018-05-04 | End: 2019-08-15

## 2018-05-04 RX ORDER — SODIUM CHLORIDE 9 MG/ML
INJECTION, SOLUTION INTRAVENOUS ONCE
Status: COMPLETED | OUTPATIENT
Start: 2018-05-04 | End: 2018-05-04

## 2018-05-04 RX ADMIN — PROPOFOL 50 MG: 10 INJECTION, EMULSION INTRAVENOUS at 11:05

## 2018-05-04 RX ADMIN — PROPOFOL 20 MG: 10 INJECTION, EMULSION INTRAVENOUS at 11:05

## 2018-05-04 RX ADMIN — PROPOFOL 30 MG: 10 INJECTION, EMULSION INTRAVENOUS at 11:05

## 2018-05-04 RX ADMIN — PROPOFOL 100 MG: 10 INJECTION, EMULSION INTRAVENOUS at 11:05

## 2018-05-04 RX ADMIN — SODIUM CHLORIDE: 0.9 INJECTION, SOLUTION INTRAVENOUS at 10:05

## 2018-05-04 RX ADMIN — LIDOCAINE HYDROCHLORIDE 100 MG: 20 INJECTION, SOLUTION INTRAVENOUS at 11:05

## 2018-05-04 NOTE — PROVATION PATIENT INSTRUCTIONS
Discharge Summary/Instructions after an Endoscopic Procedure  Patient Name: Meera Britt  Patient MRN: [id number]  Patient YOB: 1950  Friday, May 04, 2018  Heladio Gonzalez MD  RESTRICTIONS:  During your procedure today, you received medications for sedation.  These   medications may affect your judgment, balance and coordination.  Therefore,   for 24 hours, you have the following restrictions:   - DO NOT drive a car, operate machinery, make legal/financial decisions,   sign important papers or drink alcohol.    ACTIVITY:  The following day: return to full activity including work, except no heavy   lifting, straining or running for 3 days if polyps were removed.  DIET:  Eat and drink normally unless instructed otherwise.     TREATMENT FOR COMMON SIDE EFFECTS:  - Mild abdominal pain, nausea, belching, bloating or excessive gas:  rest,   eat lightly and use a heating pad.  - Sore Throat: treat with throat lozenges and/or gargle with warm salt   water.  - Because air was used during the procedure, expelling large amounts of air   from your rectum or belching is normal.  - If a bowel prep was taken, you may not have a bowel movement for 1-3 days.    This is normal.  SYMPTOMS TO WATCH FOR AND REPORT TO YOUR PHYSICIAN:  1. Abdominal pain or bloating, other than gas cramps.  2. Chest pain.  3. Back pain.  4. Signs of infection such as: chills or fever occurring within 24 hours   after the procedure.  5. Rectal bleeding, which would show as bright red, maroon, or black stools.   (A tablespoon of blood from the rectum is not serious, especially if   hemorrhoids are present.)  6. Vomiting.  7. Weakness or dizziness.  GO DIRECTLY TO THE NEAREST EMERGENCY ROOM IF YOU HAVE ANY OF THE FOLLOWING:      Difficulty breathing              Chills and/or fever over 101 F   Persistent vomiting and/or vomiting blood   Severe abdominal pain   Severe chest pain   Black, tarry stools   Bleeding- more than one tablespoon   Any  other symptom or condition that you feel may need urgent attention  Your doctor recommends these additional instructions:  If any biopsies were taken, your doctors clinic will contact you in 1 to 2   weeks with any results.  - Discharge patient to home.   - Resume previous diet.   - Follow an antireflux regimen.   - Use a proton pump inhibitor PO BID for 4 weeks.   - Await pathology results.   - Return to primary care physician as previously scheduled.   - Return to GI clinic in 3 weeks.   - The findings and recommendations were discussed with the patient's family.     - Patient has a contact number available for emergencies.  The signs and   symptoms of potential delayed complications were discussed with the   patient.  Return to normal activities tomorrow.  Written discharge   instructions were provided to the patient.  For questions, problems or results please call your physician - Heladio Gonzalez MD at Work:  [phone].  [facility] can be reached at [phone]     IF A COMPLICATION OR EMERGENCY SITUATION ARISES AND YOU ARE UNABLE TO REACH   YOUR PHYSICIAN - GO DIRECTLY TO THE EMERGENCY ROOM.  Heladio Gonzalez MD  5/4/2018 11:26:53 AM  This report has been verified and signed electronically.

## 2018-05-04 NOTE — H&P (VIEW-ONLY)
Office Visit    Patient Name: Meera Britt    : 1950  MRN: [id number]    Subjective:  Meera is a 67 y.o. female who presents today for:    Gastroesophageal Reflux; Heartburn; and check right eye      This patient has multiple medical diagnoses as noted below.  This patient is known to me and to this clinic.  She reports severe heartburn that would not go away.  She has a family history of CAD.  She has worsening symptoms that are located in the epigastric region.  She has used Pantoprazole and ranitidine without relief.  She will have the discomfort all day long.  She recently has a younger brother have an MI. Her brother is about 13 years younger than her.  Her sister who is 9 years younger than her had an MI in 2017.  She current symptoms feel internal.  No pain will applied pressure.        Patient Active Problem List   Diagnosis    Seasonal allergies    Arthritis    GERD with esophagitis    Hyperthyroidism    Pure hypercholesterolemia    Vitamin D deficiency       Past Surgical History:   Procedure Laterality Date    COLONOSCOPY N/A 10/26/2017    Procedure: COLONOSCOPY;  Surgeon: Heladio Gonzalez MD;  Location: [facility];  Service: Endoscopy;  Laterality: N/A;    HYSTERECTOMY  1980    OOPHORECTOMY      TONSILLECTOMY         No family history on file.    Social History     Social History    Marital status:      Spouse name: N/A    Number of children: N/A    Years of education: N/A     Occupational History    Not on file.     Social History Main Topics    Smoking status: Never Smoker    Smokeless tobacco: Never Used    Alcohol use No    Drug use: Unknown    Sexual activity: Not on file     Other Topics Concern    Not on file     Social History Narrative    No narrative on file       Current Medications  Medications reviewed and updated.     Allergies   Review of patient's allergies indicates:   Allergen Reactions    Iodine and iodide containing products       bloating    Sulfa (sulfonamide antibiotics) Nausea And Vomiting         Labs  Lab Results   Component Value Date    HGBA1C 5.5 10/04/2017     Lab Results   Component Value Date     10/04/2017    K 4.8 10/04/2017     10/04/2017    CO2 28 10/04/2017    BUN 12 10/04/2017    CREATININE 0.8 10/04/2017    CALCIUM 10.0 10/04/2017    ANIONGAP 8 10/04/2017    ESTGFRAFRICA >60.0 10/04/2017    EGFRNONAA >60.0 10/04/2017     Lab Results   Component Value Date    CHOL 244 (H) 03/22/2018    CHOL 255 (H) 10/04/2017     Lab Results   Component Value Date    HDL 56 03/22/2018    HDL 59 10/04/2017     Lab Results   Component Value Date    LDLCALC 136.4 03/22/2018    LDLCALC 126.2 10/04/2017     Lab Results   Component Value Date    TRIG 258 (H) 03/22/2018    TRIG 349 (H) 10/04/2017     Lab Results   Component Value Date    CHOLHDL 23.0 03/22/2018    CHOLHDL 23.1 10/04/2017     Last set of blood work has been reviewed as noted above.    Review of Systems   Constitutional: Negative for activity change, appetite change, fatigue, fever and unexpected weight change.   HENT: Negative.  Negative for ear discharge, ear pain, rhinorrhea and sore throat.    Eyes: Negative.    Respiratory: Negative for apnea, cough, chest tightness, shortness of breath and wheezing.    Cardiovascular: Negative for chest pain, palpitations and leg swelling.   Gastrointestinal: Positive for abdominal pain. Negative for abdominal distention, constipation, diarrhea and vomiting.   Endocrine: Negative for cold intolerance, heat intolerance, polydipsia and polyuria.   Genitourinary: Negative for decreased urine volume, menstrual problem, urgency, vaginal bleeding, vaginal discharge and vaginal pain.   Musculoskeletal: Negative.    Skin: Negative for rash.   Neurological: Negative for dizziness and headaches.   Hematological: Does not bruise/bleed easily.   Psychiatric/Behavioral: Negative for agitation, sleep disturbance and suicidal ideas.       BP  "110/70 (BP Location: Left arm, Patient Position: Sitting, BP Method: Large (Manual))   Pulse 70   Temp 98.4 °F (36.9 °C) (Oral)   Ht 5' 2" (1.575 m)   Wt 67.5 kg (148 lb 13 oz)   SpO2 98%   BMI 27.22 kg/m²      Physical Exam   Constitutional: She is oriented to person, place, and time. She appears well-developed and well-nourished.   HENT:   Head: Normocephalic and atraumatic.   Right Ear: External ear normal.   Left Ear: External ear normal.   Nose: Nose normal.   Mouth/Throat: Oropharynx is clear and moist.   Eyes: Conjunctivae and EOM are normal. Pupils are equal, round, and reactive to light.   Neck: Normal range of motion. No JVD present. No thyromegaly present.   Cardiovascular: Normal rate, regular rhythm and normal heart sounds.    Pulmonary/Chest: Effort normal and breath sounds normal. She has no wheezes.   Abdominal: Soft. Bowel sounds are normal. She exhibits no distension. There is no tenderness.   Musculoskeletal: Normal range of motion.   Lymphadenopathy:     She has no cervical adenopathy.   Neurological: She is alert and oriented to person, place, and time. She has normal reflexes.   Skin: Skin is warm and dry.   Psychiatric: She has a normal mood and affect. Her behavior is normal.   Vitals reviewed.        EKG reviewed in the office.    Health Maintenance  Health Maintenance       Date Due Completion Date    Hepatitis C Screening 1950 ---    DEXA SCAN 09/12/1992 ---    Zoster Vaccine 09/12/2012 ---    Pneumococcal (65+) (2 of 2 - PPSV23) 01/03/2019 1/3/2018    Mammogram 10/04/2019 10/4/2017    TETANUS VACCINE 09/06/2022 9/6/2012    Lipid Panel 03/22/2023 3/22/2018    Colonoscopy 10/26/2027 10/26/2017          Assessment/Plan:  Meera Britt is a 67 y.o. female who presents today for :    1. GERD with esophagitis    2. Hyperthyroidism    3. Pure hypercholesterolemia    4. Other chest pain        Problem List Items Addressed This Visit        Unprioritized    GERD with esophagitis - " Primary    Overview     Nissen fundoplication   -  Will follow up with [facility].          Relevant Orders    Case request GI: ESOPHAGOGASTRODUODENOSCOPY (EGD) (Completed)    Hyperthyroidism  -   Continue with tapazole     Pure hypercholesterolemia    Relevant Orders    Lipid panel  -   Pt is currently stable on medication regimen.  Continue current therapy as scheduled.  Contact office with any questions about adjustments on medications.   -  Needs to continue to focus on diet changes   -  Consider addition of fenofibrate       Other Visit Diagnoses     Other chest pain        Relevant Orders    EKG 12-lead      ekg reviewed in office.  No significant changes noted.      Follow-up in about 6 weeks (around 5/25/2018).     This note was created by combination of typed  and Dragon dictation.  Transcription errors may be present.  If there are any questions, please contact me.

## 2018-05-04 NOTE — TRANSFER OF CARE
"Anesthesia Transfer of Care Note    Patient: Meera Britt    Procedure(s) Performed: Procedure(s) (LRB):  ESOPHAGOGASTRODUODENOSCOPY (EGD) (N/A)    Patient location: GI    Anesthesia Type: general    Transport from OR: Transported from OR on room air with adequate spontaneous ventilation    Post pain: adequate analgesia    Post assessment: no apparent anesthetic complications and tolerated procedure well    Post vital signs: stable    Level of consciousness: sedated and responds to stimulation    Nausea/Vomiting: no nausea/vomiting    Complications: none    Transfer of care protocol was followed      Last vitals:   Visit Vitals  BP (!) 152/72 (BP Location: Left arm, Patient Position: Lying)   Pulse 83   Temp 36.4 °C (97.5 °F) (Oral)   Resp 18   Ht 5' 2" (1.575 m)   Wt 67.1 kg (148 lb)   SpO2 98%   Breastfeeding? No   BMI 27.07 kg/m²     "

## 2018-05-07 ENCOUNTER — TELEPHONE (OUTPATIENT)
Dept: FAMILY MEDICINE | Facility: CLINIC | Age: 68
End: 2018-05-07

## 2018-05-07 NOTE — ANESTHESIA PREPROCEDURE EVALUATION
05/07/2018  Meera Britt is a 67 y.o., female.    Anesthesia Evaluation    I have reviewed the Patient Summary Reports.     I have reviewed the Medications.     Review of Systems  Anesthesia Hx:  No problems with previous Anesthesia   Denies Personal Hx of Anesthesia complications.   Hematology/Oncology:  Hematology Normal   Oncology Normal     EENT/Dental:EENT/Dental Normal   Cardiovascular:  Cardiovascular Normal Exercise tolerance: good     Pulmonary:  Pulmonary Normal    Renal/:  Renal/ Normal     Hepatic/GI:   GERD    Musculoskeletal:  Musculoskeletal Normal    Neurological:  Neurology Normal    Endocrine:   Hyperthyroidism    Dermatological:  Skin Normal    Psych:  Psychiatric Normal           Physical Exam  General:  Well nourished    Airway/Jaw/Neck:  Airway Findings: Mouth Opening: Normal Tongue: Normal  Mallampati: II      Dental:  Dental Findings: In tact   Chest/Lungs:  Chest/Lungs Clear    Heart/Vascular:  Heart Findings: Normal Heart murmur: negative       Mental Status:  Mental Status Findings:  Cooperative, Alert and Oriented         Anesthesia Plan  Type of Anesthesia, risks & benefits discussed:  Anesthesia Type:  general, MAC, regional  Patient's Preference:   Intra-op Monitoring Plan: standard ASA monitors  Intra-op Monitoring Plan Comments:   Post Op Pain Control Plan: multimodal analgesia  Post Op Pain Control Plan Comments:   Induction:   IV  Beta Blocker:  Patient is not currently on a Beta-Blocker (No further documentation required).       Informed Consent: Patient understands risks and agrees with Anesthesia plan.  Questions answered. Anesthesia consent signed with patient.  ASA Score: 3     Day of Surgery Review of History & Physical:            Ready For Surgery From Anesthesia Perspective.        Abdomen soft, non-tender, no guarding.

## 2018-05-07 NOTE — TELEPHONE ENCOUNTER
----- Message from Verónica Ruiz sent at 5/7/2018 10:50 AM CDT -----  Contact: self  Pt has additional questions regarding lovastatin (MEVACOR) 40 MG tablet. States she has been taking medication 2 x a day. Was not aware directions changed to once daily. Pt [phone].

## 2018-05-07 NOTE — TELEPHONE ENCOUNTER
Patient said that she has been taking the Mevacor 40 mg two tablets  a day but her last Rx said for her to take 1 tablet a day. She never notice it and was still taking two a day. Need to know if it should be 1 or two you told her that her cholesterol was going down. Please advise if she needs to stay on the two or go down to one tablet.

## 2018-05-07 NOTE — ANESTHESIA POSTPROCEDURE EVALUATION
"Anesthesia Post Evaluation    Patient: Meera Britt    Procedure(s) Performed: Procedure(s) (LRB):  ESOPHAGOGASTRODUODENOSCOPY (EGD) (N/A)    Final Anesthesia Type: general  Patient location during evaluation: PACU  Patient participation: Yes- Able to Participate  Level of consciousness: awake and alert, oriented and awake  Post-procedure vital signs: reviewed and stable  Pain management: adequate  Airway patency: patent  PONV status at discharge: No PONV  Anesthetic complications: no      Cardiovascular status: blood pressure returned to baseline  Respiratory status: unassisted and spontaneous ventilation  Hydration status: euvolemic  Follow-up not needed.        Visit Vitals  /72   Pulse 66   Temp 36.4 °C (97.5 °F) (Oral)   Resp 18   Ht 5' 2" (1.575 m)   Wt 67.1 kg (148 lb)   SpO2 98%   Breastfeeding? No   BMI 27.07 kg/m²       Pain/Hiral Score: No Data Recorded      "

## 2018-06-28 ENCOUNTER — TELEPHONE (OUTPATIENT)
Dept: FAMILY MEDICINE | Facility: CLINIC | Age: 68
End: 2018-06-28

## 2018-06-28 DIAGNOSIS — M94.9 DISORDER OF BONE AND CARTILAGE: ICD-10-CM

## 2018-06-28 DIAGNOSIS — Z11.59 NEED FOR HEPATITIS C SCREENING TEST: Primary | ICD-10-CM

## 2018-06-28 DIAGNOSIS — M89.9 DISORDER OF BONE AND CARTILAGE: ICD-10-CM

## 2018-06-28 NOTE — TELEPHONE ENCOUNTER
Spoke w/ patient, overdue Health Maintenance was discussed. An appointment was already scheduled for fasting blood work on 07/03/18. Fasting instructions were given.    Zoster not ordered secondary to patient's insurance. Retail pharmacy preferred.    PS: Dr. Antoine I have ordered a hepatitis c and lipid panel. Orders pended for Bone Mineral Density. Please order any additional labs and I will link them to the appointment.    Thanks,    Katharine

## 2018-07-03 ENCOUNTER — LAB VISIT (OUTPATIENT)
Dept: LAB | Facility: HOSPITAL | Age: 68
End: 2018-07-03
Attending: FAMILY MEDICINE
Payer: MEDICARE

## 2018-07-03 DIAGNOSIS — E78.00 PURE HYPERCHOLESTEROLEMIA: ICD-10-CM

## 2018-07-03 DIAGNOSIS — Z11.59 NEED FOR HEPATITIS C SCREENING TEST: ICD-10-CM

## 2018-07-03 LAB
CHOLEST SERPL-MCNC: 238 MG/DL
CHOLEST/HDLC SERPL: 4.4 {RATIO}
HDLC SERPL-MCNC: 54 MG/DL
HDLC SERPL: 22.7 %
LDLC SERPL CALC-MCNC: 142.2 MG/DL
NONHDLC SERPL-MCNC: 184 MG/DL
TRIGL SERPL-MCNC: 209 MG/DL

## 2018-07-03 PROCEDURE — 80061 LIPID PANEL: CPT

## 2018-07-03 PROCEDURE — 36415 COLL VENOUS BLD VENIPUNCTURE: CPT | Mod: PO

## 2018-07-03 PROCEDURE — 86803 HEPATITIS C AB TEST: CPT

## 2018-07-05 ENCOUNTER — TELEPHONE (OUTPATIENT)
Dept: FAMILY MEDICINE | Facility: CLINIC | Age: 68
End: 2018-07-05

## 2018-07-05 LAB — HCV AB SERPL QL IA: NEGATIVE

## 2018-07-05 NOTE — TELEPHONE ENCOUNTER
----- Message from REYNA John-C sent at 7/5/2018  3:15 PM CDT -----  Please inform patient her hepatitis C screening test is normal/negative.

## 2018-07-12 ENCOUNTER — OFFICE VISIT (OUTPATIENT)
Dept: FAMILY MEDICINE | Facility: CLINIC | Age: 68
End: 2018-07-12
Payer: MEDICARE

## 2018-07-12 VITALS
OXYGEN SATURATION: 99 % | TEMPERATURE: 99 F | WEIGHT: 146.19 LBS | BODY MASS INDEX: 26.9 KG/M2 | SYSTOLIC BLOOD PRESSURE: 110 MMHG | HEIGHT: 62 IN | HEART RATE: 62 BPM | DIASTOLIC BLOOD PRESSURE: 80 MMHG

## 2018-07-12 DIAGNOSIS — E78.00 PURE HYPERCHOLESTEROLEMIA: ICD-10-CM

## 2018-07-12 DIAGNOSIS — E05.90 HYPERTHYROIDISM: Primary | ICD-10-CM

## 2018-07-12 PROCEDURE — 99999 PR PBB SHADOW E&M-EST. PATIENT-LVL III: CPT | Mod: PBBFAC,,, | Performed by: FAMILY MEDICINE

## 2018-07-12 PROCEDURE — 99214 OFFICE O/P EST MOD 30 MIN: CPT | Mod: S$GLB,,, | Performed by: FAMILY MEDICINE

## 2018-07-12 PROCEDURE — 3008F BODY MASS INDEX DOCD: CPT | Mod: S$GLB,,, | Performed by: FAMILY MEDICINE

## 2018-07-12 RX ORDER — FENOFIBRATE 160 MG/1
160 TABLET ORAL DAILY
Qty: 90 TABLET | Refills: 3 | Status: SHIPPED | OUTPATIENT
Start: 2018-07-12 | End: 2019-07-15 | Stop reason: SDUPTHER

## 2018-07-12 RX ORDER — METHIMAZOLE 5 MG/1
5 TABLET ORAL DAILY
Qty: 90 TABLET | Refills: 1 | Status: SHIPPED | OUTPATIENT
Start: 2018-07-12 | End: 2019-04-26 | Stop reason: SDUPTHER

## 2018-07-12 NOTE — PROGRESS NOTES
Assessment & Plan  Problem List Items Addressed This Visit        Unprioritized    Hyperthyroidism - Primary    Relevant Medications    methIMAzole (TAPAZOLE) 5 MG Tab    Other Relevant Orders    TSH    Pure hypercholesterolemia    Relevant Medications    fenofibrate 160 MG Tab    Other Relevant Orders    Lipid panel            Health Maintenance reviewed.     Follow-up: No Follow-up on file.    ______________________________________________________________________    Chief Complaint  Chief Complaint   Patient presents with    Hyperlipidemia    Medication Refill       HPI  Meera Britt is a 67 y.o. female with multiple medical diagnoses as listed in the medical history and problem list that presents for follow up blood work.  Pt is known to me with last appointment 4/13/2018.      Patient denies any new symptoms including chest pain, SOB, blurry vision, N/V, diarrhea. She requires refills on her medication.  We discussed her recent cholesterol results.  We discussed diet changes and medication modification.  She does have significant family history of CAD with a mother who had a heart attack at 45.  Discussed need to repeat blood work in 6 weeks.       PAST MEDICAL HISTORY:  Past Medical History:   Diagnosis Date    Allergy     Arthritis     Endometrial cancer 1980    GERD (gastroesophageal reflux disease)     Hyperlipidemia     Thyroid disease        PAST SURGICAL HISTORY:  Past Surgical History:   Procedure Laterality Date    COLONOSCOPY N/A 10/26/2017    Procedure: COLONOSCOPY;  Surgeon: Heladio Gonzalez MD;  Location: [facility];  Service: Endoscopy;  Laterality: N/A;    HYSTERECTOMY  1980    OOPHORECTOMY      TONSILLECTOMY         SOCIAL HISTORY:  Social History     Social History    Marital status:      Spouse name: N/A    Number of children: N/A    Years of education: N/A     Occupational History    Not on file.     Social History Main Topics    Smoking status: Never Smoker     Smokeless tobacco: Never Used    Alcohol use No    Drug use: Unknown    Sexual activity: Not on file     Other Topics Concern    Not on file     Social History Narrative    No narrative on file       FAMILY HISTORY:  History reviewed. No pertinent family history.    ALLERGIES AND MEDICATIONS: updated and reviewed.  Review of patient's allergies indicates:   Allergen Reactions    Iodine and iodide containing products      bloating    Sulfa (sulfonamide antibiotics) Nausea And Vomiting     Current Outpatient Prescriptions   Medication Sig Dispense Refill    ACETAMINOPHEN/DIPHENHYDRAMINE (TYLENOL SEVERE ALLERGY ORAL) Take by mouth.      COLLAGEN MISC by Misc.(Non-Drug; Combo Route) route.      flaxseed oil Oil 1,200 mg by Misc.(Non-Drug; Combo Route) route 2 (two) times daily.      fluticasone (FLONASE) 50 mcg/actuation nasal spray 1 spray by Each Nare route once daily. 1 Bottle 0    levocetirizine (XYZAL) 5 MG tablet Take 1 tablet (5 mg total) by mouth every evening. 30 tablet 0    magnesium oxide 400 mg Cap Take 1 capsule by mouth once.      pantoprazole (PROTONIX) 40 MG tablet Take 1 tablet (40 mg total) by mouth 2 (two) times daily. 90 tablet 4    ranitidine (ZANTAC) 150 MG tablet Take 150 mg by mouth 2 (two) times daily.      sucralfate (CARAFATE) 1 gram tablet Take 1 tablet (1 g total) by mouth 4 (four) times daily. 120 tablet 3    fenofibrate 160 MG Tab Take 1 tablet (160 mg total) by mouth once daily. 90 tablet 3    methIMAzole (TAPAZOLE) 5 MG Tab Take 1 tablet (5 mg total) by mouth once daily. 90 tablet 1     No current facility-administered medications for this visit.          ROS  Review of Systems   Constitutional: Negative for activity change, appetite change, fatigue, fever and unexpected weight change.   HENT: Negative.  Negative for ear discharge, ear pain, rhinorrhea and sore throat.    Eyes: Negative.    Respiratory: Negative for apnea, cough, chest tightness, shortness of breath and  "wheezing.    Cardiovascular: Negative for chest pain, palpitations and leg swelling.   Gastrointestinal: Negative for abdominal distention, abdominal pain, constipation, diarrhea and vomiting.   Endocrine: Negative for cold intolerance, heat intolerance, polydipsia and polyuria.   Genitourinary: Negative for decreased urine volume, menstrual problem, urgency, vaginal bleeding, vaginal discharge and vaginal pain.   Musculoskeletal: Negative.    Skin: Negative for rash.   Neurological: Negative for dizziness and headaches.   Hematological: Does not bruise/bleed easily.   Psychiatric/Behavioral: Negative for agitation, sleep disturbance and suicidal ideas.         Physical Exam  Vitals:    07/12/18 1012   BP: 110/80   BP Location: Left arm   Patient Position: Sitting   BP Method: Large (Manual)   Pulse: 62   Temp: 98.5 °F (36.9 °C)   TempSrc: Oral   SpO2: 99%   Weight: 66.3 kg (146 lb 2.6 oz)   Height: 5' 2" (1.575 m)    Body mass index is 26.73 kg/m².  Weight: 66.3 kg (146 lb 2.6 oz)   Height: 5' 2" (157.5 cm)   Physical Exam   Constitutional: She is oriented to person, place, and time. She appears well-developed and well-nourished.   HENT:   Head: Normocephalic.   Right Ear: External ear normal.   Left Ear: External ear normal.   Nose: Nose normal.   Mouth/Throat: Oropharynx is clear and moist.   Eyes: Conjunctivae and EOM are normal. Pupils are equal, round, and reactive to light.   Cardiovascular: Normal rate, regular rhythm and normal heart sounds.    Pulmonary/Chest: Effort normal and breath sounds normal.   Neurological: She is alert and oriented to person, place, and time.   Skin: Skin is warm and dry.   Vitals reviewed.        Health Maintenance       Date Due Completion Date    DEXA SCAN 09/12/1992 ---    Zoster Vaccine 09/12/2012 ---    Influenza Vaccine 08/01/2018 1/3/2018    Pneumococcal (65+) (2 of 2 - PPSV23) 01/03/2019 1/3/2018    Mammogram 10/04/2019 10/4/2017    TETANUS VACCINE 09/06/2022 9/6/2012    " Lipid Panel 07/03/2023 7/3/2018    Colonoscopy 10/26/2027 10/26/2017

## 2018-08-20 ENCOUNTER — LAB VISIT (OUTPATIENT)
Dept: LAB | Facility: HOSPITAL | Age: 68
End: 2018-08-20
Attending: FAMILY MEDICINE
Payer: MEDICARE

## 2018-08-20 DIAGNOSIS — E05.90 HYPERTHYROIDISM: ICD-10-CM

## 2018-08-20 DIAGNOSIS — E78.00 PURE HYPERCHOLESTEROLEMIA: ICD-10-CM

## 2018-08-20 LAB
CHOLEST SERPL-MCNC: 227 MG/DL
CHOLEST/HDLC SERPL: 3.9 {RATIO}
HDLC SERPL-MCNC: 58 MG/DL
HDLC SERPL: 25.6 %
LDLC SERPL CALC-MCNC: 138.8 MG/DL
NONHDLC SERPL-MCNC: 169 MG/DL
TRIGL SERPL-MCNC: 151 MG/DL
TSH SERPL DL<=0.005 MIU/L-ACNC: 2.38 UIU/ML

## 2018-08-20 PROCEDURE — 36415 COLL VENOUS BLD VENIPUNCTURE: CPT | Mod: PO

## 2018-08-20 PROCEDURE — 80061 LIPID PANEL: CPT

## 2018-08-20 PROCEDURE — 84443 ASSAY THYROID STIM HORMONE: CPT

## 2018-10-23 ENCOUNTER — TELEPHONE (OUTPATIENT)
Dept: FAMILY MEDICINE | Facility: CLINIC | Age: 68
End: 2018-10-23

## 2018-10-23 DIAGNOSIS — Z12.39 SCREENING FOR MALIGNANT NEOPLASM OF BREAST: Primary | ICD-10-CM

## 2018-10-23 NOTE — TELEPHONE ENCOUNTER
----- Message from Ivanna Dimas sent at 10/23/2018 10:24 AM CDT -----  Contact: Self   Patient need Mammogram Orders. Please call at [phone]

## 2018-11-01 ENCOUNTER — HOSPITAL ENCOUNTER (OUTPATIENT)
Dept: RADIOLOGY | Facility: HOSPITAL | Age: 68
Discharge: HOME | End: 2018-11-01
Attending: FAMILY MEDICINE
Payer: MEDICARE

## 2018-11-01 ENCOUNTER — HOSPITAL ENCOUNTER (OUTPATIENT)
Dept: RADIOLOGY | Facility: CLINIC | Age: 68
Discharge: HOME | End: 2018-11-01
Attending: FAMILY MEDICINE
Payer: MEDICARE

## 2018-11-01 DIAGNOSIS — Z12.39 SCREENING FOR MALIGNANT NEOPLASM OF BREAST: ICD-10-CM

## 2018-11-01 DIAGNOSIS — M89.9 DISORDER OF BONE AND CARTILAGE: ICD-10-CM

## 2018-11-01 DIAGNOSIS — M94.9 DISORDER OF BONE AND CARTILAGE: ICD-10-CM

## 2018-11-01 PROCEDURE — 77080 DXA BONE DENSITY AXIAL: CPT | Mod: TC,PO

## 2018-11-01 PROCEDURE — 77067 SCR MAMMO BI INCL CAD: CPT | Mod: 26,,, | Performed by: RADIOLOGY

## 2018-11-01 PROCEDURE — 77080 DXA BONE DENSITY AXIAL: CPT | Mod: 26,,, | Performed by: INTERNAL MEDICINE

## 2018-11-01 PROCEDURE — 77063 BREAST TOMOSYNTHESIS BI: CPT | Mod: 26,,, | Performed by: RADIOLOGY

## 2018-11-01 PROCEDURE — 77063 BREAST TOMOSYNTHESIS BI: CPT | Mod: TC,PO

## 2018-11-01 PROCEDURE — 77067 SCR MAMMO BI INCL CAD: CPT | Mod: TC,PO

## 2018-12-26 ENCOUNTER — OFFICE VISIT (OUTPATIENT)
Dept: FAMILY MEDICINE | Facility: CLINIC | Age: 68
End: 2018-12-26
Payer: MEDICARE

## 2018-12-26 VITALS
OXYGEN SATURATION: 96 % | DIASTOLIC BLOOD PRESSURE: 70 MMHG | SYSTOLIC BLOOD PRESSURE: 116 MMHG | TEMPERATURE: 98 F | BODY MASS INDEX: 27.12 KG/M2 | HEART RATE: 76 BPM | HEIGHT: 62 IN | WEIGHT: 147.38 LBS

## 2018-12-26 DIAGNOSIS — J30.9 ALLERGIC SINUSITIS: Primary | ICD-10-CM

## 2018-12-26 DIAGNOSIS — H10.13 ALLERGIC CONJUNCTIVITIS AND RHINITIS, BILATERAL: ICD-10-CM

## 2018-12-26 DIAGNOSIS — J30.9 ALLERGIC CONJUNCTIVITIS AND RHINITIS, BILATERAL: ICD-10-CM

## 2018-12-26 PROCEDURE — 99213 OFFICE O/P EST LOW 20 MIN: CPT | Mod: S$GLB,,, | Performed by: INTERNAL MEDICINE

## 2018-12-26 PROCEDURE — 99999 PR PBB SHADOW E&M-EST. PATIENT-LVL IV: CPT | Mod: PBBFAC,,, | Performed by: INTERNAL MEDICINE

## 2018-12-26 PROCEDURE — 1101F PT FALLS ASSESS-DOCD LE1/YR: CPT | Mod: S$GLB,,, | Performed by: INTERNAL MEDICINE

## 2018-12-26 RX ORDER — KETOTIFEN FUMARATE 0.35 MG/ML
1 SOLUTION/ DROPS OPHTHALMIC 2 TIMES DAILY
Qty: 5 ML | Refills: 0 | Status: SHIPPED | OUTPATIENT
Start: 2018-12-26 | End: 2019-08-15

## 2018-12-26 RX ORDER — PREDNISONE 20 MG/1
40 TABLET ORAL DAILY
Qty: 10 TABLET | Refills: 0 | Status: SHIPPED | OUTPATIENT
Start: 2018-12-26 | End: 2018-12-31

## 2018-12-26 NOTE — PROGRESS NOTES
Subjective:     Chief Complaint  Chief Complaint   Patient presents with    Sinus Problem    Sore Throat       HPI  Meera Britt is a 68 y.o. female with medical diagnoses as listed in the medical history and problem list that presents for sore throat/sinus problem.  Last clinic visit was 7/12/2018.      Sore throat with sinus problems for over 1 week  History of sinus problems  No f/c/sweats    Patient Care Team:  Loretta Antoine MD as PCP - General (Family Medicine)      PAST MEDICAL HISTORY:  Past Medical History:   Diagnosis Date    Allergy     Arthritis     Endometrial cancer 1980    GERD (gastroesophageal reflux disease)     Hyperlipidemia     Thyroid disease        PAST SURGICAL HISTORY:  Past Surgical History:   Procedure Laterality Date    COLONOSCOPY N/A 10/26/2017    [facility]    ESOPHAGOGASTRODUODENOSCOPY (EGD) N/A 5/4/2018    [facility]    HYSTERECTOMY  1980    OOPHORECTOMY      TONSILLECTOMY         SOCIAL HISTORY:  Social History     Socioeconomic History    Marital status:      Spouse name: Not on file    Number of children: Not on file    Years of education: Not on file    Highest education level: Not on file   Social Needs    Financial resource strain: Not on file    Food insecurity - worry: Not on file    Food insecurity - inability: Not on file    Transportation needs - medical: Not on file    Transportation needs - non-medical: Not on file   Occupational History    Not on file   Tobacco Use    Smoking status: Never Smoker    Smokeless tobacco: Never Used   Substance and Sexual Activity    Alcohol use: No    Drug use: Not on file    Sexual activity: Not on file   Other Topics Concern    Not on file   Social History Narrative    Not on file       FAMILY HISTORY:  History reviewed. No pertinent family history.    ALLERGIES AND MEDICATIONS: updated and reviewed.  Review of patient's  allergies indicates:   Allergen Reactions    Iodine and iodide containing products      bloating    Sulfa (sulfonamide antibiotics) Nausea And Vomiting     Current Outpatient Medications   Medication Sig Dispense Refill    ACETAMINOPHEN/DIPHENHYDRAMINE (TYLENOL SEVERE ALLERGY ORAL) Take by mouth.      fenofibrate 160 MG Tab Take 1 tablet (160 mg total) by mouth once daily. 90 tablet 3    flaxseed oil Oil 1,200 mg by Misc.(Non-Drug; Combo Route) route 2 (two) times daily.      fluticasone (FLONASE) 50 mcg/actuation nasal spray 1 spray by Each Nare route once daily. 1 Bottle 0    magnesium oxide 400 mg Cap Take 1 capsule by mouth once.      methIMAzole (TAPAZOLE) 5 MG Tab Take 1 tablet (5 mg total) by mouth once daily. 90 tablet 1    pantoprazole (PROTONIX) 40 MG tablet Take 1 tablet (40 mg total) by mouth 2 (two) times daily. 90 tablet 4    COLLAGEN MISC by Misc.(Non-Drug; Combo Route) route.      ketotifen (ZADITOR) 0.025 % (0.035 %) ophthalmic solution Place 1 drop into both eyes 2 (two) times daily. 5 mL 0    levocetirizine (XYZAL) 5 MG tablet Take 1 tablet (5 mg total) by mouth every evening. 30 tablet 0    predniSONE (DELTASONE) 20 MG tablet Take 2 tablets (40 mg total) by mouth once daily. for 5 days 10 tablet 0    ranitidine (ZANTAC) 150 MG tablet Take 150 mg by mouth 2 (two) times daily.      sucralfate (CARAFATE) 1 gram tablet Take 1 tablet (1 g total) by mouth 4 (four) times daily. 120 tablet 3     No current facility-administered medications for this visit.          ROS:  Review of Systems   Constitutional: Negative for chills, diaphoresis and fever.   HENT: Positive for congestion, rhinorrhea, sinus pressure, sinus pain and sore throat. Negative for ear pain and sneezing.    Eyes: Negative for discharge and redness.   Respiratory: Negative for cough.    Cardiovascular: Negative for chest pain.   Gastrointestinal: Negative for nausea and vomiting.   Musculoskeletal: Negative for arthralgias  "and myalgias.   Allergic/Immunologic: Negative for environmental allergies.   Neurological: Negative for headaches.       Objective:       Physical Exam  Vitals:    12/26/18 1523   BP: 116/70   Pulse: 76   Temp: 97.6 °F (36.4 °C)   TempSrc: Oral   SpO2: 96%   Weight: 66.8 kg (147 lb 6 oz)   Height: 5' 2" (1.575 m)    Body mass index is 26.96 kg/m².  Weight: 66.8 kg (147 lb 6 oz)   Height: 5' 2" (157.5 cm)   Physical Exam   Constitutional: She appears well-developed and well-nourished. No distress.   HENT:   Head: Normocephalic and atraumatic.   Nose: Mucosal edema and rhinorrhea present.   Mouth/Throat: Oropharynx is clear and moist. No oropharyngeal exudate.   Eyes: Conjunctivae and EOM are normal. Pupils are equal, round, and reactive to light. Right eye exhibits no discharge. Left eye exhibits no discharge.   Neck: Normal range of motion. Neck supple. No thyromegaly present.   Cardiovascular: Normal rate, normal heart sounds and intact distal pulses. Exam reveals no gallop and no friction rub.   No murmur heard.  Pulmonary/Chest: Effort normal and breath sounds normal. No stridor. No respiratory distress. She has no wheezes.   Musculoskeletal: She exhibits no edema or deformity.   Lymphadenopathy:     She has no cervical adenopathy.   Neurological: She is alert. No cranial nerve deficit.   Skin: Skin is warm and dry.   Psychiatric: She has a normal mood and affect. Her behavior is normal.   Vitals reviewed.          Assessment:     1. Allergic sinusitis    2. Allergic conjunctivitis and rhinitis, bilateral      Plan:     Meera was seen today for sinus problem and sore throat.    Diagnoses and all orders for this visit:    Allergic sinusitis  Discussed symptoms of allergic sinusitis and reviewed treatment modalities, including antihistamines, nasal steroids and advantages/limitations of OTC products (i.e., OTC decongestants, sinus rinse kits). Therapy as noted/per orders. Steroid burst prescribed presently  -     " predniSONE (DELTASONE) 20 MG tablet; Take 2 tablets (40 mg total) by mouth once daily. for 5 days    Allergic conjunctivitis and rhinitis, bilateral  Trial ophthalmic anti-histamine therapy  -     ketotifen (ZADITOR) 0.025 % (0.035 %) ophthalmic solution; Place 1 drop into both eyes 2 (two) times daily.          Health Maintenance       Date Due Completion Date    Zoster Vaccine 09/12/2012 ---    Influenza Vaccine 08/01/2018 1/3/2018    Pneumococcal Vaccine (65+ Low/Medium Risk) (2 of 2 - PPSV23) 01/03/2019 1/3/2018    Mammogram 11/01/2020 11/1/2018    DEXA SCAN 11/01/2021 11/1/2018    TETANUS VACCINE 09/06/2022 9/6/2012    Lipid Panel 08/20/2023 8/20/2018    Colonoscopy 10/26/2027 10/26/2017            Health Maintenance reviewed and addressed as per orders    Follow-up if symptoms worsen or fail to improve.    The patient expressed understanding and no barriers to adherence were identified.     1. The patient indicates understanding of these issues and agrees with the plan. Brief care plan is updated and reviewed with the patient as applicable.     2. The patient is given an After Visit Summary that lists all medications with directions, allergies, orders placed during this encounter and follow-up instructions.     3. I have reviewed the patient's medical information including past medical, family, and social history sections including the medications and allergies.     4. We discussed the patient's current medications. I reconciled the patient's medication list and prepared and supplied needed refills.       David Chong MD  Internal Medicine-Pediatrics

## 2018-12-26 NOTE — PATIENT INSTRUCTIONS
Causes of Sinusitis  Mucus helps keep your sinuses clean. But mucus may build up in the sinuses because of colds, allergies, or blockages. These things get in the way of the natural drainage of mucus. This may lead to sinusitis. Sinusitis means sinus inflammation and infection.  · Acute sinusitis comes on suddenly. It often happens right after an upper respiratory infection, such as a cold. Viruses cause most acute sinus infections.  · Chronic sinusitis is ongoing swelling of the sinus lining. Doctors don't know what causes chronic sinusitis.      Colds and other infections  A cold or flu may cause your sinus and nasal linings to swell. Sinus openings can become blocked. This causes mucus to back up. This backed-up mucus becomes an ideal place for bacteria to grow. Thick, yellow, or discolored mucus is one sign of infection.  Allergic reactions  You may be sensitive to certain substances. This causes the release of histamine in the body. Histamine makes your sinus and nasal linings swell. Long-term swelling clogs your sinuses. It prevents the tiny hairs (cilia) in the nasal lining from sweeping away mucus. Allergy symptoms can continue over time. But theyre less severe than with colds.  Blockages  · A polyp is a sac of swollen tissue. It can be the result of an allergy or infection. It may block the opening where most of your sinuses drain (middle meatus). It may even grow large enough to block your nose.  · A deviated septum is when the thin wall inside your nose is pushed to one side. It is often the result of injury. This can block your middle meatus.  People with chronic nasal problems or allergies are more likely to get acute sinusitis. Sinusitis is also more common if you have a weakened immune system, such as with HIV. You are also more likely to get sinusitis if you have cystic fibrosis or another condition that causes your body to make extra mucus.  Date Last Reviewed: 10/1/2016  © 1383-7888 [employer] [address] [place] [address], [place] [address]. All rights reserved. This information is not intended as a substitute for professional medical care. Always follow your healthcare professional's instructions.

## 2019-01-27 ENCOUNTER — OFFICE VISIT (OUTPATIENT)
Dept: URGENT CARE | Facility: CLINIC | Age: 69
End: 2019-01-27
Payer: MEDICARE

## 2019-01-27 VITALS
DIASTOLIC BLOOD PRESSURE: 82 MMHG | OXYGEN SATURATION: 98 % | HEART RATE: 73 BPM | TEMPERATURE: 98 F | BODY MASS INDEX: 27.6 KG/M2 | WEIGHT: 150 LBS | RESPIRATION RATE: 18 BRPM | SYSTOLIC BLOOD PRESSURE: 146 MMHG | HEIGHT: 62 IN

## 2019-01-27 DIAGNOSIS — J01.00 ACUTE MAXILLARY SINUSITIS, RECURRENCE NOT SPECIFIED: Primary | ICD-10-CM

## 2019-01-27 DIAGNOSIS — J02.9 SORE THROAT: ICD-10-CM

## 2019-01-27 DIAGNOSIS — R05.8 COUGH PRODUCTIVE OF PURULENT SPUTUM: ICD-10-CM

## 2019-01-27 LAB
CTP QC/QA: YES
CTP QC/QA: YES
FLUAV AG NPH QL: NEGATIVE
FLUBV AG NPH QL: NEGATIVE
S PYO RRNA THROAT QL PROBE: NEGATIVE

## 2019-01-27 PROCEDURE — 99214 PR OFFICE/OUTPT VISIT, EST, LEVL IV, 30-39 MIN: ICD-10-PCS | Mod: 25,S$GLB,, | Performed by: NURSE PRACTITIONER

## 2019-01-27 PROCEDURE — 1101F PT FALLS ASSESS-DOCD LE1/YR: CPT | Mod: S$GLB,,, | Performed by: NURSE PRACTITIONER

## 2019-01-27 PROCEDURE — 87880 POCT RAPID STREP A: ICD-10-PCS | Mod: QW,S$GLB,, | Performed by: NURSE PRACTITIONER

## 2019-01-27 PROCEDURE — 87804 POCT INFLUENZA A/B: ICD-10-PCS | Mod: QW,S$GLB,, | Performed by: NURSE PRACTITIONER

## 2019-01-27 PROCEDURE — 87804 INFLUENZA ASSAY W/OPTIC: CPT | Mod: QW,S$GLB,, | Performed by: NURSE PRACTITIONER

## 2019-01-27 PROCEDURE — 87880 STREP A ASSAY W/OPTIC: CPT | Mod: QW,S$GLB,, | Performed by: NURSE PRACTITIONER

## 2019-01-27 PROCEDURE — 96372 PR INJECTION,THERAP/PROPH/DIAG2ST, IM OR SUBCUT: ICD-10-PCS | Mod: S$GLB,,, | Performed by: NURSE PRACTITIONER

## 2019-01-27 PROCEDURE — 99214 OFFICE O/P EST MOD 30 MIN: CPT | Mod: 25,S$GLB,, | Performed by: NURSE PRACTITIONER

## 2019-01-27 PROCEDURE — 96372 THER/PROPH/DIAG INJ SC/IM: CPT | Mod: S$GLB,,, | Performed by: NURSE PRACTITIONER

## 2019-01-27 PROCEDURE — 1101F PR PT FALLS ASSESS DOC 0-1 FALLS W/OUT INJ PAST YR: ICD-10-PCS | Mod: S$GLB,,, | Performed by: NURSE PRACTITIONER

## 2019-01-27 RX ORDER — BENZONATATE 100 MG/1
200 CAPSULE ORAL 3 TIMES DAILY PRN
Qty: 30 CAPSULE | Refills: 0 | Status: SHIPPED | OUTPATIENT
Start: 2019-01-27 | End: 2019-08-15

## 2019-01-27 RX ORDER — BETAMETHASONE SODIUM PHOSPHATE AND BETAMETHASONE ACETATE 3; 3 MG/ML; MG/ML
6 INJECTION, SUSPENSION INTRA-ARTICULAR; INTRALESIONAL; INTRAMUSCULAR; SOFT TISSUE
Status: COMPLETED | OUTPATIENT
Start: 2019-01-27 | End: 2019-01-27

## 2019-01-27 RX ORDER — AMOXICILLIN AND CLAVULANATE POTASSIUM 875; 125 MG/1; MG/1
1 TABLET, FILM COATED ORAL 2 TIMES DAILY
Qty: 20 TABLET | Refills: 0 | Status: SHIPPED | OUTPATIENT
Start: 2019-01-27 | End: 2019-02-06

## 2019-01-27 RX ORDER — PROMETHAZINE HYDROCHLORIDE AND DEXTROMETHORPHAN HYDROBROMIDE 6.25; 15 MG/5ML; MG/5ML
5 SYRUP ORAL EVERY 6 HOURS PRN
Qty: 180 ML | Refills: 0 | Status: SHIPPED | OUTPATIENT
Start: 2019-01-27 | End: 2019-02-06

## 2019-01-27 RX ADMIN — BETAMETHASONE SODIUM PHOSPHATE AND BETAMETHASONE ACETATE 6 MG: 3; 3 INJECTION, SUSPENSION INTRA-ARTICULAR; INTRALESIONAL; INTRAMUSCULAR; SOFT TISSUE at 10:01

## 2019-01-27 NOTE — PATIENT INSTRUCTIONS
Sinusitis (Antibiotic Treatment)    The sinuses are air-filled spaces within the bones of the face. They connect to the inside of the nose. Sinusitis is an inflammation of the tissue lining the sinus cavity. Sinus inflammation can occur during a cold. It can also be due to allergies to pollens and other particles in the air. Sinusitis can cause symptoms of sinus congestion and fullness. A sinus infection causes fever, headache and facial pain. There is often green or yellow drainage from the nose or into the back of the throat (post-nasal drip). You have been given antibiotics to treat this condition.  Home care:  · Take the full course of antibiotics as instructed. Do not stop taking them, even if you feel better.  · Drink plenty of water, hot tea, and other liquids. This may help thin mucus. It also may promote sinus drainage.  · Heat may help soothe painful areas of the face. Use a towel soaked in hot water. Or,  the shower and direct the hot spray onto your face. Using a vaporizer along with a menthol rub at night may also help.   · An expectorant containing guaifenesin may help thin the mucus and promote drainage from the sinuses.  · Over-the-counter decongestants may be used unless a similar medicine was prescribed. Nasal sprays work the fastest. Use one that contains phenylephrine or oxymetazoline. First blow the nose gently. Then use the spray. Do not use these medicines more often than directed on the label or symptoms may get worse. You may also use tablets containing pseudoephedrine. Avoid products that combine ingredients, because side effects may be increased. Read labels. You can also ask the pharmacist for help. (NOTE: Persons with high blood pressure should not use decongestants. They can raise blood pressure.)  · Over-the-counter antihistamines may help if allergies contributed to your sinusitis.    · Do not use nasal rinses or irrigation during an acute sinus infection, unless told to by  your health care provider. Rinsing may spread the infection to other sinuses.  · Use acetaminophen or ibuprofen to control pain, unless another pain medicine was prescribed. (If you have chronic liver or kidney disease or ever had a stomach ulcer, talk with your doctor before using these medicines. Aspirin should never be used in anyone under 18 years of age who is ill with a fever. It may cause severe liver damage.)  · Don't smoke. This can worsen symptoms.  Follow-up care  Follow up with your healthcare provider or our staff if you are not improving within the next week.  When to seek medical advice  Call your healthcare provider if any of these occur:  · Facial pain or headache becoming more severe  · Stiff neck  · Unusual drowsiness or confusion  · Swelling of the forehead or eyelids  · Vision problems, including blurred or double vision  · Fever of 100.4ºF (38ºC) or higher, or as directed by your healthcare provider  · Seizure  · Breathing problems  · Symptoms not resolving within 10 days  Date Last Reviewed: 4/13/2015  © 1153-3127 The [employer]. [address] [place] [address], [place] [address]. All rights reserved. This information is not intended as a substitute for professional medical care. Always follow your healthcare professional's instructions.

## 2019-01-27 NOTE — PROGRESS NOTES
"Subjective:       Patient ID: Meera Britt is a 68 y.o. female.    Vitals:  height is 5' 2" (1.575 m) and weight is 68 kg (150 lb). Her temperature is 97.9 °F (36.6 °C). Her blood pressure is 146/82 (abnormal) and her pulse is 73. Her respiration is 18 and oxygen saturation is 98%.     Chief Complaint: Cough; Sore Throat; and Headache    Cough   This is a new problem. The current episode started in the past 7 days. The problem has been unchanged. The problem occurs constantly. The cough is productive of purulent sputum. Associated symptoms include headaches and a sore throat. Pertinent negatives include no chills, ear pain, eye redness, fever, hemoptysis, myalgias, rash, shortness of breath or wheezing. The symptoms are aggravated by lying down and exercise. Treatments tried: tylenol severe cold and sinus robitussin. The treatment provided mild relief.   Sore Throat    Associated symptoms include congestion, coughing and headaches. Pertinent negatives include no ear pain, shortness of breath, stridor or vomiting.   Headache    Associated symptoms include coughing, sinus pressure and a sore throat. Pertinent negatives include no ear pain, eye redness, fever, nausea or vomiting.       Constitution: Negative for chills, sweating, fatigue and fever.   HENT: Positive for congestion, sinus pain, sinus pressure and sore throat. Negative for ear pain and voice change.    Neck: Negative for painful lymph nodes.   Eyes: Negative for eye redness.   Respiratory: Positive for cough. Negative for chest tightness, sputum production, bloody sputum, COPD, shortness of breath, stridor, wheezing and asthma.    Gastrointestinal: Negative for nausea and vomiting.   Musculoskeletal: Negative for muscle ache.   Skin: Negative for rash.   Allergic/Immunologic: Negative for seasonal allergies and asthma.   Neurological: Positive for headaches.   Hematologic/Lymphatic: Negative for swollen lymph nodes.       Objective:      Physical Exam "   Constitutional: She is oriented to person, place, and time. She appears well-developed and well-nourished. She is cooperative.  Non-toxic appearance. She does not appear ill. No distress.   HENT:   Head: Normocephalic and atraumatic.   Right Ear: Hearing and ear canal normal. A middle ear effusion is present.   Left Ear: Hearing and ear canal normal. A middle ear effusion is present.   Nose: Mucosal edema and sinus tenderness present. No rhinorrhea or nasal deformity. No epistaxis. Right sinus exhibits maxillary sinus tenderness. Right sinus exhibits no frontal sinus tenderness. Left sinus exhibits maxillary sinus tenderness. Left sinus exhibits no frontal sinus tenderness.   Mouth/Throat: Uvula is midline and mucous membranes are normal. No trismus in the jaw. Normal dentition. No uvula swelling. Posterior oropharyngeal erythema present.   Eyes: Conjunctivae and lids are normal. Right eye exhibits no discharge. Left eye exhibits no discharge. No scleral icterus.   Sclera clear bilat   Neck: Trachea normal, normal range of motion, full passive range of motion without pain and phonation normal. Neck supple.   Cardiovascular: Normal rate, regular rhythm, normal heart sounds, intact distal pulses and normal pulses.   Pulmonary/Chest: Effort normal and breath sounds normal. No respiratory distress.   Abdominal: Soft. Normal appearance and bowel sounds are normal. She exhibits no distension, no pulsatile midline mass and no mass. There is no tenderness.   Musculoskeletal: Normal range of motion. She exhibits no edema or deformity.   Neurological: She is alert and oriented to person, place, and time. She exhibits normal muscle tone. Coordination normal.   Skin: Skin is warm, dry and intact. She is not diaphoretic. No pallor.   Psychiatric: She has a normal mood and affect. Her speech is normal and behavior is normal. Judgment and thought content normal. Cognition and memory are normal.   Nursing note and vitals  reviewed.      Assessment:       1. Acute maxillary sinusitis, recurrence not specified    2. Sore throat    3. Cough productive of purulent sputum        Plan:       Patient Instructions     Sinusitis (Antibiotic Treatment)    The sinuses are air-filled spaces within the bones of the face. They connect to the inside of the nose. Sinusitis is an inflammation of the tissue lining the sinus cavity. Sinus inflammation can occur during a cold. It can also be due to allergies to pollens and other particles in the air. Sinusitis can cause symptoms of sinus congestion and fullness. A sinus infection causes fever, headache and facial pain. There is often green or yellow drainage from the nose or into the back of the throat (post-nasal drip). You have been given antibiotics to treat this condition.  Home care:  · Take the full course of antibiotics as instructed. Do not stop taking them, even if you feel better.  · Drink plenty of water, hot tea, and other liquids. This may help thin mucus. It also may promote sinus drainage.  · Heat may help soothe painful areas of the face. Use a towel soaked in hot water. Or,  the shower and direct the hot spray onto your face. Using a vaporizer along with a menthol rub at night may also help.   · An expectorant containing guaifenesin may help thin the mucus and promote drainage from the sinuses.  · Over-the-counter decongestants may be used unless a similar medicine was prescribed. Nasal sprays work the fastest. Use one that contains phenylephrine or oxymetazoline. First blow the nose gently. Then use the spray. Do not use these medicines more often than directed on the label or symptoms may get worse. You may also use tablets containing pseudoephedrine. Avoid products that combine ingredients, because side effects may be increased. Read labels. You can also ask the pharmacist for help. (NOTE: Persons with high blood pressure should not use decongestants. They can raise blood  pressure.)  · Over-the-counter antihistamines may help if allergies contributed to your sinusitis.    · Do not use nasal rinses or irrigation during an acute sinus infection, unless told to by your health care provider. Rinsing may spread the infection to other sinuses.  · Use acetaminophen or ibuprofen to control pain, unless another pain medicine was prescribed. (If you have chronic liver or kidney disease or ever had a stomach ulcer, talk with your doctor before using these medicines. Aspirin should never be used in anyone under 18 years of age who is ill with a fever. It may cause severe liver damage.)  · Don't smoke. This can worsen symptoms.  Follow-up care  Follow up with your healthcare provider or our staff if you are not improving within the next week.  When to seek medical advice  Call your healthcare provider if any of these occur:  · Facial pain or headache becoming more severe  · Stiff neck  · Unusual drowsiness or confusion  · Swelling of the forehead or eyelids  · Vision problems, including blurred or double vision  · Fever of 100.4ºF (38ºC) or higher, or as directed by your healthcare provider  · Seizure  · Breathing problems  · Symptoms not resolving within 10 days  Date Last Reviewed: 4/13/2015  © 0311-9941 [employer]. [address] [place] [address], [place] [address]. All rights reserved. This information is not intended as a substitute for professional medical care. Always follow your healthcare professional's instructions.              Acute maxillary sinusitis, recurrence not specified    Sore throat  -     POCT rapid strep A  -     POCT Influenza A/B    Cough productive of purulent sputum    Other orders  -     betamethasone acetate-betamethasone sodium phosphate injection 6 mg  -     amoxicillin-clavulanate 875-125mg (AUGMENTIN) 875-125 mg per tablet; Take 1 tablet by mouth 2 (two) times daily. for 10 days  Dispense: 20 tablet; Refill: 0  -     benzonatate (TESSALON PERLES) 100 MG  capsule; Take 2 capsules (200 mg total) by mouth 3 (three) times daily as needed.  Dispense: 30 capsule; Refill: 0  -     promethazine-dextromethorphan (PROMETHAZINE-DM) 6.25-15 mg/5 mL Syrp; Take 5 mLs by mouth every 6 (six) hours as needed.  Dispense: 180 mL; Refill: 0

## 2019-04-26 DIAGNOSIS — E05.90 HYPERTHYROIDISM: ICD-10-CM

## 2019-04-26 RX ORDER — METHIMAZOLE 5 MG/1
TABLET ORAL
Qty: 90 TABLET | Refills: 0 | Status: SHIPPED | OUTPATIENT
Start: 2019-04-26 | End: 2019-07-29 | Stop reason: SDUPTHER

## 2019-05-30 ENCOUNTER — OFFICE VISIT (OUTPATIENT)
Dept: FAMILY MEDICINE | Facility: CLINIC | Age: 69
End: 2019-05-30
Payer: MEDICARE

## 2019-05-30 VITALS
WEIGHT: 153 LBS | HEART RATE: 71 BPM | BODY MASS INDEX: 28.16 KG/M2 | OXYGEN SATURATION: 97 % | SYSTOLIC BLOOD PRESSURE: 124 MMHG | HEIGHT: 62 IN | DIASTOLIC BLOOD PRESSURE: 88 MMHG | TEMPERATURE: 98 F

## 2019-05-30 DIAGNOSIS — E78.5 HYPERLIPIDEMIA, UNSPECIFIED HYPERLIPIDEMIA TYPE: ICD-10-CM

## 2019-05-30 DIAGNOSIS — J02.9 SORE THROAT: Primary | ICD-10-CM

## 2019-05-30 DIAGNOSIS — E55.9 VITAMIN D DEFICIENCY: ICD-10-CM

## 2019-05-30 DIAGNOSIS — K21.00 GERD WITH ESOPHAGITIS: ICD-10-CM

## 2019-05-30 DIAGNOSIS — E05.90 HYPERTHYROIDISM: ICD-10-CM

## 2019-05-30 PROCEDURE — 99214 PR OFFICE/OUTPT VISIT, EST, LEVL IV, 30-39 MIN: ICD-10-PCS | Mod: S$GLB,,, | Performed by: FAMILY MEDICINE

## 2019-05-30 PROCEDURE — 1101F PR PT FALLS ASSESS DOC 0-1 FALLS W/OUT INJ PAST YR: ICD-10-PCS | Mod: S$GLB,,, | Performed by: FAMILY MEDICINE

## 2019-05-30 PROCEDURE — 99999 PR PBB SHADOW E&M-EST. PATIENT-LVL III: ICD-10-PCS | Mod: PBBFAC,,, | Performed by: FAMILY MEDICINE

## 2019-05-30 PROCEDURE — 99999 PR PBB SHADOW E&M-EST. PATIENT-LVL III: CPT | Mod: PBBFAC,,, | Performed by: FAMILY MEDICINE

## 2019-05-30 PROCEDURE — 1101F PT FALLS ASSESS-DOCD LE1/YR: CPT | Mod: S$GLB,,, | Performed by: FAMILY MEDICINE

## 2019-05-30 PROCEDURE — 99214 OFFICE O/P EST MOD 30 MIN: CPT | Mod: S$GLB,,, | Performed by: FAMILY MEDICINE

## 2019-05-30 NOTE — PROGRESS NOTES
Office Visit    Patient Name: Meera Britt    : 1950  MRN: [id number]      Assessment/Plan:  Meera Britt is a 68 y.o. female who presents today for :    Sore throat  -  NEG   POCT rapid strep A  -Mild symptoms, most likely viral etiology  -AFVSS - appears to be improving.  -advised pt on natural progression of viral illness with reassurance provided - and based on clinical findings today, does not warrant Abx treatment at this time. D/w pt about the potential risks of inappropriate Abx use and that if patient's Sx worsens, we will re-evaluate to assess the need to change the treatment plan - Patient voices understanding and agrees to plan of care.  -advised frequent hand washing, rest, and plenty of fluids. Tylenol as needed for pain- may use honey/cough drops/Cepacol as needed for throat pain. Claritin PRN for drainage  -     Tylenol every 4-6 hours as needed for fever, headaches, sore throat, ear pain, bodyaches, and/or nasal/sinus inflammation  -may call or RTC if pt develops fever, or if pain and general Sx worsens.    Hyperthyroidism  -     CBC Without Differential; Future; Expected date: 2019  -     TSH; Future; Expected date: 2019  -stable, continue current medication regimen  -recheck TSH    GERD with esophagitis  -stable, asymptomatic, continue to monitor    Vitamin D deficiency  -     Vitamin D; Future; Expected date: 2019    Hyperlipidemia, unspecified hyperlipidemia type  -     Lipid panel; Future; Expected date: 2019  -continue statin   -advised low-fat diet, regular exercise, and weight loss.       Follow up for worsening Sx. Urgent care/ED precautions provided.     This note was created by combination of typed  and Dragon dictation.  Transcription errors may be present.  If there are any questions, please contact  me.      ----------------------------------------------------------------------------------------------------------------------      HPI:      Meera is a 68 y.o. female with      Patient Active Problem List   Diagnosis    Seasonal allergies    Arthritis    GERD with esophagitis    Hyperthyroidism    Pure hypercholesterolemia    Vitamin D deficiency    Heartburn       Patient presents today for :  Sore Throat  that started this morning. Feels  Scratchy but tolerable. She wants to check for strep as her grandaughter came down with the strep the past week. Pt denies facial pressure and pain.  Patient has tried OTC meds at home (Tylenol sinus) with good relief. No body aches.    Patient denies any F/C/N/V/sore throat/ear pain/coughing/nasal congestion.    Hyperthyroidism - tolerating medication well, no side effects.   Last TSH   Lab Results   Component Value Date    TSH 2.382 08/20/2018     Patient denies fatigue/generalized arthralgias or myalgia/cold intolerance/tremors/wt gain/dry skin/hair loss/brittle nails    Additional ROS    No dysphagia  No CP/SOB/palpitations/swelling  No nausea/vomiting/abd pain/no diarrhea  No rashes      Patient Active Problem List   Diagnosis    Seasonal allergies    Arthritis    GERD with esophagitis    Hyperthyroidism    Pure hypercholesterolemia    Vitamin D deficiency    Heartburn       Current Medications  Medications reviewed and updated.       Current Outpatient Medications:     ACETAMINOPHEN/DIPHENHYDRAMINE (TYLENOL SEVERE ALLERGY ORAL), Take by mouth., Disp: , Rfl:     fenofibrate 160 MG Tab, Take 1 tablet (160 mg total) by mouth once daily., Disp: 90 tablet, Rfl: 3    flaxseed oil Oil, 1,200 mg by Misc.(Non-Drug; Combo Route) route 2 (two) times daily., Disp: , Rfl:     fluticasone (FLONASE) 50 mcg/actuation nasal spray, 1 spray by Each Nare route once daily., Disp: 1 Bottle, Rfl: 0    ketotifen (ZADITOR) 0.025 % (0.035 %) ophthalmic solution, Place 1 drop  into both eyes 2 (two) times daily., Disp: 5 mL, Rfl: 0    magnesium oxide 400 mg Cap, Take 1 capsule by mouth once., Disp: , Rfl:     methIMAzole (TAPAZOLE) 5 MG Tab, TAKE 1 TABLET BY MOUTH ONCE DAILY, Disp: 90 tablet, Rfl: 0    pantoprazole (PROTONIX) 40 MG tablet, Take 1 tablet (40 mg total) by mouth 2 (two) times daily., Disp: 90 tablet, Rfl: 4    benzonatate (TESSALON PERLES) 100 MG capsule, Take 2 capsules (200 mg total) by mouth 3 (three) times daily as needed., Disp: 30 capsule, Rfl: 0    levocetirizine (XYZAL) 5 MG tablet, Take 1 tablet (5 mg total) by mouth every evening., Disp: 30 tablet, Rfl: 0    Past Surgical History:   Procedure Laterality Date    COLONOSCOPY N/A 10/26/2017    [facility]    ESOPHAGOGASTRODUODENOSCOPY (EGD) N/A 5/4/2018    [facility] at [facility]    HYSTERECTOMY  1980    OOPHORECTOMY      TONSILLECTOMY         History reviewed. No pertinent family history.    Social History     Socioeconomic History    Marital status:      Spouse name: Not on file    Number of children: Not on file    Years of education: Not on file    Highest education level: Not on file   Occupational History    Not on file   Social Needs    Financial resource strain: Not on file    Food insecurity:     Worry: Not on file     Inability: Not on file    Transportation needs:     Medical: Not on file     Non-medical: Not on file   Tobacco Use    Smoking status: Never Smoker    Smokeless tobacco: Never Used   Substance and Sexual Activity    Alcohol use: No    Drug use: Not on file    Sexual activity: Not on file   Lifestyle    Physical activity:     Days per week: Not on file     Minutes per session: Not on file    Stress: Not on file   Relationships    Social connections:     Talks on phone: Not on file     Gets together: Not on file     Attends Mandaen service: Not on file     Active member of club or organization: Not on  "file     Attends meetings of clubs or organizations: Not on file     Relationship status: Not on file   Other Topics Concern    Not on file   Social History Narrative    Not on file             Allergies   Review of patient's allergies indicates:   Allergen Reactions    Iodine and iodide containing products      bloating    Sulfa (sulfonamide antibiotics) Nausea And Vomiting             Review of Systems  See HPI      Physical Exam  /88   Pulse 71   Temp 97.9 °F (36.6 °C) (Oral)   Ht 5' 2" (1.575 m)   Wt 69.4 kg (153 lb)   SpO2 97%   BMI 27.98 kg/m²     GEN: NAD, well developed  HEENT: NCAT, PERRLA, EOMI, sclera clear, anicteric, TM clear bilaterally with normal light reflex, mild nasal turbinate swelling, MMM with no lesions, O/P with mild erythema but no tonsillar swelling/discharge,no trismus, no uvula deviation, +mild drainage  NECK: normal, supple with midline trachea, no LAD, no thyromegaly  LUNGS: CTAB, no w/r/r, no increased work of breathing  HEART: RRR, normal S1 and S2, no m/r/g  ABD: s/nt/nd, NABS  SKIN: normal turgor, no rashes, no other lesions.   PSYCH: AOx3, appropriate mood and affect      "

## 2019-07-15 DIAGNOSIS — E78.00 PURE HYPERCHOLESTEROLEMIA: ICD-10-CM

## 2019-07-15 RX ORDER — FENOFIBRATE 160 MG/1
TABLET ORAL
Qty: 30 TABLET | Refills: 0 | Status: SHIPPED | OUTPATIENT
Start: 2019-07-15 | End: 2019-08-15 | Stop reason: SDUPTHER

## 2019-07-15 NOTE — TELEPHONE ENCOUNTER
Spoke with patient and relayed the message. Patient stated she has a appointment with Dr. Arriaza in August, because you were booked up.

## 2019-07-29 DIAGNOSIS — E05.90 HYPERTHYROIDISM: ICD-10-CM

## 2019-07-29 RX ORDER — METHIMAZOLE 5 MG/1
TABLET ORAL
Qty: 30 TABLET | Refills: 0 | Status: SHIPPED | OUTPATIENT
Start: 2019-07-29 | End: 2019-08-15 | Stop reason: SDUPTHER

## 2019-08-01 ENCOUNTER — PATIENT OUTREACH (OUTPATIENT)
Dept: ADMINISTRATIVE | Facility: HOSPITAL | Age: 69
End: 2019-08-01

## 2019-08-01 RX ORDER — PANTOPRAZOLE SODIUM 20 MG/1
20 TABLET, DELAYED RELEASE ORAL DAILY
Refills: 1 | COMMUNITY
Start: 2019-07-03 | End: 2020-08-18

## 2019-08-08 ENCOUNTER — LAB VISIT (OUTPATIENT)
Dept: LAB | Facility: HOSPITAL | Age: 69
End: 2019-08-08
Attending: FAMILY MEDICINE
Payer: MEDICARE

## 2019-08-08 DIAGNOSIS — E05.90 HYPERTHYROIDISM: ICD-10-CM

## 2019-08-08 DIAGNOSIS — E55.9 VITAMIN D DEFICIENCY: ICD-10-CM

## 2019-08-08 DIAGNOSIS — E78.5 HYPERLIPIDEMIA, UNSPECIFIED HYPERLIPIDEMIA TYPE: ICD-10-CM

## 2019-08-08 LAB
25(OH)D3+25(OH)D2 SERPL-MCNC: 31 NG/ML (ref 30–96)
CHOLEST SERPL-MCNC: 253 MG/DL (ref 120–199)
CHOLEST/HDLC SERPL: 4.1 {RATIO} (ref 2–5)
ERYTHROCYTE [DISTWIDTH] IN BLOOD BY AUTOMATED COUNT: 12.4 % (ref 11.5–14.5)
HCT VFR BLD AUTO: 38.6 % (ref 37–48.5)
HDLC SERPL-MCNC: 62 MG/DL (ref 40–75)
HDLC SERPL: 24.5 % (ref 20–50)
HGB BLD-MCNC: 12.6 G/DL (ref 12–16)
LDLC SERPL CALC-MCNC: 166.2 MG/DL (ref 63–159)
MCH RBC QN AUTO: 32 PG (ref 27–31)
MCHC RBC AUTO-ENTMCNC: 32.6 G/DL (ref 32–36)
MCV RBC AUTO: 98 FL (ref 82–98)
NONHDLC SERPL-MCNC: 191 MG/DL
PLATELET # BLD AUTO: 388 K/UL (ref 150–350)
PMV BLD AUTO: 10.4 FL (ref 9.2–12.9)
RBC # BLD AUTO: 3.94 M/UL (ref 4–5.4)
TRIGL SERPL-MCNC: 124 MG/DL (ref 30–150)
TSH SERPL DL<=0.005 MIU/L-ACNC: 3.29 UIU/ML (ref 0.4–4)
WBC # BLD AUTO: 4.87 K/UL (ref 3.9–12.7)

## 2019-08-08 PROCEDURE — 36415 COLL VENOUS BLD VENIPUNCTURE: CPT | Mod: PO

## 2019-08-08 PROCEDURE — 84443 ASSAY THYROID STIM HORMONE: CPT

## 2019-08-08 PROCEDURE — 80061 LIPID PANEL: CPT

## 2019-08-08 PROCEDURE — 85027 COMPLETE CBC AUTOMATED: CPT

## 2019-08-08 PROCEDURE — 82306 VITAMIN D 25 HYDROXY: CPT

## 2019-08-15 ENCOUNTER — OFFICE VISIT (OUTPATIENT)
Dept: FAMILY MEDICINE | Facility: CLINIC | Age: 69
End: 2019-08-15
Payer: MEDICARE

## 2019-08-15 VITALS
BODY MASS INDEX: 26.94 KG/M2 | DIASTOLIC BLOOD PRESSURE: 70 MMHG | OXYGEN SATURATION: 96 % | TEMPERATURE: 98 F | HEART RATE: 71 BPM | HEIGHT: 62 IN | WEIGHT: 146.38 LBS | SYSTOLIC BLOOD PRESSURE: 100 MMHG

## 2019-08-15 DIAGNOSIS — E78.00 PURE HYPERCHOLESTEROLEMIA: ICD-10-CM

## 2019-08-15 DIAGNOSIS — Z00.00 ANNUAL PHYSICAL EXAM: Primary | ICD-10-CM

## 2019-08-15 DIAGNOSIS — E05.90 HYPERTHYROIDISM: ICD-10-CM

## 2019-08-15 PROCEDURE — 99999 PR PBB SHADOW E&M-EST. PATIENT-LVL III: ICD-10-PCS | Mod: PBBFAC,,, | Performed by: FAMILY MEDICINE

## 2019-08-15 PROCEDURE — 99397 PER PM REEVAL EST PAT 65+ YR: CPT | Mod: S$GLB,,, | Performed by: FAMILY MEDICINE

## 2019-08-15 PROCEDURE — 99397 PR PREVENTIVE VISIT,EST,65 & OVER: ICD-10-PCS | Mod: S$GLB,,, | Performed by: FAMILY MEDICINE

## 2019-08-15 PROCEDURE — 99999 PR PBB SHADOW E&M-EST. PATIENT-LVL III: CPT | Mod: PBBFAC,,, | Performed by: FAMILY MEDICINE

## 2019-08-15 RX ORDER — FENOFIBRATE 160 MG/1
160 TABLET ORAL DAILY
Qty: 90 TABLET | Refills: 1 | Status: SHIPPED | OUTPATIENT
Start: 2019-08-15 | End: 2020-02-10

## 2019-08-15 RX ORDER — METHIMAZOLE 5 MG/1
5 TABLET ORAL DAILY
Qty: 90 TABLET | Refills: 1 | Status: SHIPPED | OUTPATIENT
Start: 2019-08-15 | End: 2020-02-10

## 2019-08-15 NOTE — MEDICAL/APP STUDENT
"Subjective:       Patient ID: Meera Britt is a 68 y.o. female.    Chief Complaint: Annual Exam    Meera Britt is a 68 y.o. female with multiple medical diagnoses as listed in the medical history and problem list that presents for Annual Exam.    Pt notes seasonal allergies, taking Tylenol Severe Allergy (acetaminophen/diphenhydramine), says this works better than Xyzol and doesn't make her tired. She also takes flonase only when she has sinus pressure.    She says she is taking B12 "for energy", denies lack of energy or fatigue but says she was taking it in her 30s when she was told her "vitamins were low", decided to start retaking it recently.    Pt is otherwise well, no complaints.      Review of Systems   Constitutional: Negative for chills and fever.   HENT: Positive for congestion and postnasal drip. Negative for sinus pain and trouble swallowing.    Eyes: Negative for visual disturbance.   Respiratory: Positive for cough (not productive, associated w/ allergies). Negative for choking and shortness of breath.    Cardiovascular: Negative for chest pain.   Gastrointestinal: Negative for abdominal pain, constipation, diarrhea, nausea and vomiting.   Genitourinary: Negative for difficulty urinating, dysuria and hematuria.   Musculoskeletal: Negative for arthralgias, gait problem and myalgias.   Skin: Negative for rash and wound.   Neurological: Negative for syncope, numbness and headaches.   Psychiatric/Behavioral: Negative for dysphoric mood. The patient is not nervous/anxious.        Past Medical History:   Diagnosis Date    Allergy     Arthritis     Endometrial cancer 1980    GERD (gastroesophageal reflux disease)     Hyperlipidemia     Thyroid disease      Past Surgical History:   Procedure Laterality Date    COLONOSCOPY N/A 10/26/2017    [facility]    ESOPHAGOGASTRODUODENOSCOPY (EGD) N/A 5/4/2018    [facility] END[facility]    " HYSTERECTOMY  1980    OOPHORECTOMY      TONSILLECTOMY       Social History     Socioeconomic History    Marital status:      Spouse name: Not on file    Number of children: Not on file    Years of education: Not on file    Highest education level: Not on file   Occupational History    Not on file   Social Needs    Financial resource strain: Not on file    Food insecurity:     Worry: Not on file     Inability: Not on file    Transportation needs:     Medical: Not on file     Non-medical: Not on file   Tobacco Use    Smoking status: Never Smoker    Smokeless tobacco: Never Used   Substance and Sexual Activity    Alcohol use: No    Drug use: Not on file    Sexual activity: Not on file   Lifestyle    Physical activity:     Days per week: Not on file     Minutes per session: Not on file    Stress: Not on file   Relationships    Social connections:     Talks on phone: Not on file     Gets together: Not on file     Attends Yazidi service: Not on file     Active member of club or organization: Not on file     Attends meetings of clubs or organizations: Not on file     Relationship status: Not on file   Other Topics Concern    Not on file   Social History Narrative    Not on file     Current Outpatient Medications on File Prior to Visit   Medication Sig Dispense Refill    ACETAMINOPHEN/DIPHENHYDRAMINE (TYLENOL SEVERE ALLERGY ORAL) Take by mouth.      flaxseed oil Oil 1,200 mg by Misc.(Non-Drug; Combo Route) route 2 (two) times daily.      fluticasone (FLONASE) 50 mcg/actuation nasal spray 1 spray by Each Nare route once daily. 1 Bottle 0    magnesium oxide 400 mg Cap Take 1 capsule by mouth once.      pantoprazole (PROTONIX) 20 MG tablet Take 20 mg by mouth once daily.  1    [DISCONTINUED] fenofibrate 160 MG Tab TAKE 1 TABLET BY MOUTH ONCE DAILY 30 tablet 0    [DISCONTINUED] methIMAzole (TAPAZOLE) 5 MG Tab TAKE 1 TABLET BY MOUTH ONCE DAILY 30 tablet 0    levocetirizine (XYZAL) 5 MG  tablet Take 1 tablet (5 mg total) by mouth every evening. 30 tablet 0    [DISCONTINUED] benzonatate (TESSALON PERLES) 100 MG capsule Take 2 capsules (200 mg total) by mouth 3 (three) times daily as needed. 30 capsule 0    [DISCONTINUED] ketotifen (ZADITOR) 0.025 % (0.035 %) ophthalmic solution Place 1 drop into both eyes 2 (two) times daily. 5 mL 0    [DISCONTINUED] pantoprazole (PROTONIX) 40 MG tablet Take 1 tablet (40 mg total) by mouth 2 (two) times daily. 90 tablet 4     No current facility-administered medications on file prior to visit.      Review of patient's allergies indicates:   Allergen Reactions    Iodine and iodide containing products      bloating    Sulfa (sulfonamide antibiotics) Nausea And Vomiting     Objective:      Physical Exam   Constitutional: She is oriented to person, place, and time. She appears well-developed and well-nourished.   HENT:   Head: Normocephalic and atraumatic.   Right Ear: External ear normal.   Left Ear: External ear normal.   Eyes: Conjunctivae and EOM are normal.   Neck: Neck supple.   Cardiovascular: Normal rate, regular rhythm, normal heart sounds and intact distal pulses.   Pulmonary/Chest: Effort normal and breath sounds normal. No respiratory distress.   Abdominal: Soft. Bowel sounds are normal. She exhibits no distension. There is no tenderness.   Musculoskeletal: She exhibits no edema or tenderness.   Neurological: She is alert and oriented to person, place, and time.   Skin: Skin is warm and dry. Capillary refill takes less than 2 seconds.   Psychiatric: Her behavior is normal.   Nursing note and vitals reviewed.      Assessment:       1. Annual physical exam    2. Pure hypercholesterolemia    3. Hyperthyroidism        Plan:       Pure hypercholesterolemia  Triglycerides improved to normal w/ fenofibrate. LDL high, discussed w/ pt diet vs medication, pt would like to try diet changes first. Discussed food choices, will recheck lipids in 6 weeks and decide on  medication at that time.    Hyperthyroidism  TSH levels adequate, asymptomatic, continue tx.    Health maintenance reviewed  Pneumovax and Shingles when she returns for lipids.

## 2019-08-15 NOTE — PROGRESS NOTES
Assessment & Plan  Problem List Items Addressed This Visit        Cardiac/Vascular    Pure hypercholesterolemia    Relevant Medications    fenofibrate 160 MG Tab       Endocrine    Hyperthyroidism    Relevant Medications    methIMAzole (TAPAZOLE) 5 MG Tab    Other Relevant Orders    Lipid panel      Other Visit Diagnoses     Annual physical exam    -  Primary        I addressed all major concerns as it related to health maintenance.  All were ordered and scheduled based on the patients wishes.  Any additional health maintenance will be readdressed at the next physical if declined or deferred by the patient.      Health Maintenance reviewed.    Follow-up: No follow-ups on file.    ______________________________________________________________________    Chief Complaint  Chief Complaint   Patient presents with    Annual Exam       HPI  Meera Britt is a 68 y.o. female with multiple medical diagnoses as listed in the medical history and problem list that presents for annual exam.  Pt is known to me with last appointment 7/12/2018.    Patient denies any new symptoms including chest pain, SOB, blurry vision, N/V, diarrhea.        PAST MEDICAL HISTORY:  Past Medical History:   Diagnosis Date    Allergy     Arthritis     Endometrial cancer 1980    GERD (gastroesophageal reflux disease)     Hyperlipidemia     Thyroid disease        PAST SURGICAL HISTORY:  Past Surgical History:   Procedure Laterality Date    COLONOSCOPY N/A 10/26/2017    Performed by [facility]ad[facility] MD at [facility]    ESOPHAGOGASTRODUODENOSCOPY (EGD) N/A 5/4/2018    Performed by [facility] MD at [facility]    HYSTERECTOMY  1980    OOPHORECTOMY      TONSILLECTOMY         SOCIAL HISTORY:  Social History     Socioeconomic History    Marital status:      Spouse name: Not on file    Number of children: Not on file    Years of education: Not on file    Highest education level: Not on file   Occupational History    Not on file    Social Needs    Financial resource strain: Not on file    Food insecurity:     Worry: Not on file     Inability: Not on file    Transportation needs:     Medical: Not on file     Non-medical: Not on file   Tobacco Use    Smoking status: Never Smoker    Smokeless tobacco: Never Used   Substance and Sexual Activity    Alcohol use: No    Drug use: Not on file    Sexual activity: Not on file   Lifestyle    Physical activity:     Days per week: Not on file     Minutes per session: Not on file    Stress: Not on file   Relationships    Social connections:     Talks on phone: Not on file     Gets together: Not on file     Attends [employer] service: Not on file     Active member of club or organization: Not on file     Attends meetings of clubs or organizations: Not on file     Relationship status: Not on file   Other Topics Concern    Not on file   Social History Narrative    Not on file       FAMILY HISTORY:  History reviewed. No pertinent family history.    ALLERGIES AND MEDICATIONS: updated and reviewed.  Review of patient's allergies indicates:   Allergen Reactions    Iodine and iodide containing products      bloating    Sulfa (sulfonamide antibiotics) Nausea And Vomiting     Current Outpatient Medications   Medication Sig Dispense Refill    ACETAMINOPHEN/DIPHENHYDRAMINE (TYLENOL SEVERE ALLERGY ORAL) Take by mouth.      fenofibrate 160 MG Tab Take 1 tablet (160 mg total) by mouth once daily. 90 tablet 1    flaxseed oil Oil 1,200 mg by Misc.(Non-Drug; Combo Route) route 2 (two) times daily.      fluticasone (FLONASE) 50 mcg/actuation nasal spray 1 spray by Each Nare route once daily. 1 Bottle 0    magnesium oxide 400 mg Cap Take 1 capsule by mouth once.      methIMAzole (TAPAZOLE) 5 MG Tab Take 1 tablet (5 mg total) by mouth once daily. 90 tablet 1    pantoprazole (PROTONIX) 20 MG tablet Take 20 mg by mouth once daily.  1    levocetirizine (XYZAL) 5 MG tablet Take 1 tablet (5 mg total) by mouth  "every evening. 30 tablet 0     No current facility-administered medications for this visit.          ROS  Review of Systems   Constitutional: Negative for activity change, appetite change, fatigue, fever and unexpected weight change.   HENT: Negative.  Negative for ear discharge, ear pain, rhinorrhea and sore throat.    Eyes: Negative.    Respiratory: Negative for apnea, cough, chest tightness, shortness of breath and wheezing.    Cardiovascular: Negative for chest pain, palpitations and leg swelling.   Gastrointestinal: Negative for abdominal distention, abdominal pain, constipation, diarrhea and vomiting.   Endocrine: Negative for cold intolerance, heat intolerance, polydipsia and polyuria.   Genitourinary: Negative for decreased urine volume and urgency.   Musculoskeletal: Negative.    Skin: Negative for rash.   Neurological: Negative for dizziness and headaches.   Hematological: Does not bruise/bleed easily.   Psychiatric/Behavioral: Negative for agitation, sleep disturbance and suicidal ideas.           Physical Exam  Vitals:    08/15/19 0918   BP: 100/70   BP Location: Left arm   Patient Position: Sitting   BP Method: Small (Manual)   Pulse: 71   Temp: 98.1 °F (36.7 °C)   TempSrc: Oral   SpO2: 96%   Weight: 66.4 kg (146 lb 6.2 oz)   Height: 5' 2" (1.575 m)    Body mass index is 26.77 kg/m².  Weight: 66.4 kg (146 lb 6.2 oz)   Height: 5' 2" (157.5 cm)   Physical Exam   Constitutional: She is oriented to person, place, and time. She appears well-developed and well-nourished.   HENT:   Head: Normocephalic and atraumatic.   Right Ear: External ear normal.   Left Ear: External ear normal.   Nose: Nose normal.   Mouth/Throat: Oropharynx is clear and moist.   Eyes: Pupils are equal, round, and reactive to light. Conjunctivae and EOM are normal.   Cardiovascular: Normal rate, regular rhythm and normal heart sounds.   Pulmonary/Chest: Effort normal and breath sounds normal.   Neurological: She is alert and oriented to " person, place, and time.   Skin: Skin is warm and dry.   Vitals reviewed.        Health Maintenance       Date Due Completion Date    Shingles Vaccine (1 of 2) 09/12/2002 ---    Pneumococcal Vaccine (65+ Low/Medium Risk) (2 of 2 - PPSV23) 01/03/2019 1/3/2018    Influenza Vaccine (1) 08/01/2019 1/3/2018    Mammogram 11/01/2020 11/1/2018    DEXA SCAN 11/01/2021 11/1/2018    TETANUS VACCINE 09/06/2022 9/6/2012    Colonoscopy 10/26/2022 10/26/2017    Lipid Panel 08/08/2024 8/8/2019              Patient note was created using [email or web address].  Any errors in syntax or even information may not have been identified and edited on initial review prior to signing this note.

## 2019-09-26 ENCOUNTER — LAB VISIT (OUTPATIENT)
Dept: LAB | Facility: HOSPITAL | Age: 69
End: 2019-09-26
Attending: FAMILY MEDICINE
Payer: MEDICARE

## 2019-09-26 DIAGNOSIS — E05.90 HYPERTHYROIDISM: ICD-10-CM

## 2019-09-26 LAB
CHOLEST SERPL-MCNC: 230 MG/DL (ref 120–199)
CHOLEST/HDLC SERPL: 4.3 {RATIO} (ref 2–5)
HDLC SERPL-MCNC: 54 MG/DL (ref 40–75)
HDLC SERPL: 23.5 % (ref 20–50)
LDLC SERPL CALC-MCNC: 144 MG/DL (ref 63–159)
NONHDLC SERPL-MCNC: 176 MG/DL
TRIGL SERPL-MCNC: 160 MG/DL (ref 30–150)

## 2019-09-26 PROCEDURE — 36415 COLL VENOUS BLD VENIPUNCTURE: CPT | Mod: PO

## 2019-09-26 PROCEDURE — 80061 LIPID PANEL: CPT

## 2019-10-09 ENCOUNTER — TELEPHONE (OUTPATIENT)
Dept: FAMILY MEDICINE | Facility: CLINIC | Age: 69
End: 2019-10-09

## 2019-10-09 NOTE — TELEPHONE ENCOUNTER
Patient said that she did a low fat diet for 6 weeks but did cheat recently.  She also said that she is taking her Rx.'ed med for the triglycerides and would like any further advice from Dr. Antoine.

## 2019-10-09 NOTE — TELEPHONE ENCOUNTER
She has had a slight improvement in her total cholesterol.  She decreased to 230.  Her LDL has decreased to 144.  However her triglycerides increased to 160.  Please ask her what she has done to address her cholesterol.

## 2019-10-09 NOTE — TELEPHONE ENCOUNTER
----- Message from Lia James sent at 10/9/2019  9:16 AM CDT -----  Contact: self  Type:  Test Results    Who Called: self    Name of Test (Lab/Mammo/Etc): Lab    Date of Test: 9/26/19    Ordering Provider: Dr Antoine    Where the test was performed: [facility]    Would the patient rather a call back or a response via [facility]?call    Best Call Back Number:

## 2019-10-10 NOTE — TELEPHONE ENCOUNTER
This recent splurge that she had is likely the cause of the elevation in her triglycerides.  Does she need to meet with a nutritionist in order to discuss diet changes?.  I believe this will be very beneficial for her to help with her cholesterol

## 2019-10-10 NOTE — TELEPHONE ENCOUNTER
Patient informed of message below.  She verbalized understanding and said that she doesn't want to see a nutritionist.  She said that she will do better with her dietary habits and exercise on her own.

## 2019-10-16 ENCOUNTER — TELEPHONE (OUTPATIENT)
Dept: FAMILY MEDICINE | Facility: CLINIC | Age: 69
End: 2019-10-16

## 2019-10-16 ENCOUNTER — OFFICE VISIT (OUTPATIENT)
Dept: FAMILY MEDICINE | Facility: CLINIC | Age: 69
End: 2019-10-16
Payer: MEDICARE

## 2019-10-16 VITALS
TEMPERATURE: 98 F | HEIGHT: 62 IN | SYSTOLIC BLOOD PRESSURE: 130 MMHG | OXYGEN SATURATION: 98 % | BODY MASS INDEX: 26.74 KG/M2 | HEART RATE: 79 BPM | DIASTOLIC BLOOD PRESSURE: 90 MMHG | WEIGHT: 145.31 LBS

## 2019-10-16 DIAGNOSIS — Z23 NEED FOR INFLUENZA VACCINATION: ICD-10-CM

## 2019-10-16 DIAGNOSIS — Z23 NEED FOR PNEUMOCOCCAL VACCINATION: ICD-10-CM

## 2019-10-16 DIAGNOSIS — J06.9 URI WITH COUGH AND CONGESTION: Primary | ICD-10-CM

## 2019-10-16 PROCEDURE — G0008 FLU VACCINE - HIGH DOSE (65+) PRESERVATIVE FREE IM: ICD-10-PCS | Mod: S$GLB,,, | Performed by: NURSE PRACTITIONER

## 2019-10-16 PROCEDURE — 99999 PR PBB SHADOW E&M-EST. PATIENT-LVL IV: CPT | Mod: PBBFAC,,, | Performed by: NURSE PRACTITIONER

## 2019-10-16 PROCEDURE — G0009 ADMIN PNEUMOCOCCAL VACCINE: HCPCS | Mod: S$GLB,,, | Performed by: NURSE PRACTITIONER

## 2019-10-16 PROCEDURE — 90662 IIV NO PRSV INCREASED AG IM: CPT | Mod: S$GLB,,, | Performed by: NURSE PRACTITIONER

## 2019-10-16 PROCEDURE — 90732 PNEUMOCOCCAL POLYSACCHARIDE VACCINE 23-VALENT =>2YO SQ IM: ICD-10-PCS | Mod: S$GLB,,, | Performed by: NURSE PRACTITIONER

## 2019-10-16 PROCEDURE — 1101F PR PT FALLS ASSESS DOC 0-1 FALLS W/OUT INJ PAST YR: ICD-10-PCS | Mod: S$GLB,,, | Performed by: NURSE PRACTITIONER

## 2019-10-16 PROCEDURE — G0008 ADMIN INFLUENZA VIRUS VAC: HCPCS | Mod: S$GLB,,, | Performed by: NURSE PRACTITIONER

## 2019-10-16 PROCEDURE — 99214 PR OFFICE/OUTPT VISIT, EST, LEVL IV, 30-39 MIN: ICD-10-PCS | Mod: 25,S$GLB,, | Performed by: NURSE PRACTITIONER

## 2019-10-16 PROCEDURE — G0009 PNEUMOCOCCAL POLYSACCHARIDE VACCINE 23-VALENT =>2YO SQ IM: ICD-10-PCS | Mod: S$GLB,,, | Performed by: NURSE PRACTITIONER

## 2019-10-16 PROCEDURE — 90662 FLU VACCINE - HIGH DOSE (65+) PRESERVATIVE FREE IM: ICD-10-PCS | Mod: S$GLB,,, | Performed by: NURSE PRACTITIONER

## 2019-10-16 PROCEDURE — 99999 PR PBB SHADOW E&M-EST. PATIENT-LVL IV: ICD-10-PCS | Mod: PBBFAC,,, | Performed by: NURSE PRACTITIONER

## 2019-10-16 PROCEDURE — 90732 PPSV23 VACC 2 YRS+ SUBQ/IM: CPT | Mod: S$GLB,,, | Performed by: NURSE PRACTITIONER

## 2019-10-16 PROCEDURE — 1101F PT FALLS ASSESS-DOCD LE1/YR: CPT | Mod: S$GLB,,, | Performed by: NURSE PRACTITIONER

## 2019-10-16 PROCEDURE — 99214 OFFICE O/P EST MOD 30 MIN: CPT | Mod: 25,S$GLB,, | Performed by: NURSE PRACTITIONER

## 2019-10-16 RX ORDER — PROMETHAZINE HYDROCHLORIDE AND DEXTROMETHORPHAN HYDROBROMIDE 6.25; 15 MG/5ML; MG/5ML
5 SYRUP ORAL EVERY 12 HOURS PRN
Qty: 180 ML | Refills: 0 | Status: SHIPPED | OUTPATIENT
Start: 2019-10-16 | End: 2019-10-26

## 2019-10-16 RX ORDER — METHYLPREDNISOLONE 4 MG/1
TABLET ORAL
Qty: 1 PACKAGE | Refills: 0 | Status: SHIPPED | OUTPATIENT
Start: 2019-10-16 | End: 2019-10-22

## 2019-10-16 RX ORDER — FLUTICASONE PROPIONATE 50 MCG
2 SPRAY, SUSPENSION (ML) NASAL DAILY
Qty: 16 G | Refills: 0 | Status: SHIPPED | OUTPATIENT
Start: 2019-10-16 | End: 2019-10-26

## 2019-10-16 RX ORDER — LEVOCETIRIZINE DIHYDROCHLORIDE 5 MG/1
5 TABLET, FILM COATED ORAL NIGHTLY PRN
Qty: 30 TABLET | Refills: 0 | Status: SHIPPED | OUTPATIENT
Start: 2019-10-16 | End: 2020-08-18

## 2019-10-16 NOTE — TELEPHONE ENCOUNTER
----- Message from Cassidy Luciano sent at 10/16/2019  9:06 AM CDT -----  Contact: Patient ph  [phone]  Type:  Sooner Appointment Request    Patient is requesting a sooner appointment.  Patient declined first available appointment listed as well as another facility and provider .  Patient will not accept being placed on the waitlist and is requesting a message be sent to doctor.    Name of Caller: Patient    When is the first available appointment?  11-07-19    Symptoms: Chest congestion, sore throat, headaches.     Would the patient rather a call back or a response via [facility]? Call back    Best Call Back Number:  [phone]    Additional Information: Patient would like to get in today. Please call.

## 2019-10-16 NOTE — TELEPHONE ENCOUNTER
"Patient states that she is feeling "crutty". Says she checked her BP and it is never high and today it was quite high. She requested an appointment with PCP as Dr. Antoine told her if she feels bad try to get in with her. Notified patient Dr. Antoine does not have opening today nor tomorrow, patient agreed to see NP. Patient has an appointment with Np Deondre Seay today 10/16/2019.  "

## 2019-10-16 NOTE — PROGRESS NOTES
Subjective:       Patient ID: Meera Britt is a 69 y.o. female.    Chief Complaint: URI (runny nose, sneezing, cough,sore throat, chest congestion X 3 days )    URI    This is a new problem. The current episode started in the past 7 days (3 days ago). There has been no fever. Associated symptoms include congestion, headaches, rhinorrhea and sneezing. Pertinent negatives include no abdominal pain, chest pain, ear pain, nausea, sinus pain, sore throat or vomiting. Treatments tried: OTC cold medication with decongestant.       Past Medical History:   Diagnosis Date    Allergy     Arthritis     Endometrial cancer 1980    GERD (gastroesophageal reflux disease)     Hyperlipidemia     Thyroid disease        Social History     Socioeconomic History    Marital status:      Spouse name: Not on file    Number of children: Not on file    Years of education: Not on file    Highest education level: Not on file   Occupational History    Not on file   Social Needs    Financial resource strain: Not on file    Food insecurity:     Worry: Not on file     Inability: Not on file    Transportation needs:     Medical: Not on file     Non-medical: Not on file   Tobacco Use    Smoking status: Never Smoker    Smokeless tobacco: Never Used   Substance and Sexual Activity    Alcohol use: No    Drug use: Not on file    Sexual activity: Not on file   Lifestyle    Physical activity:     Days per week: Not on file     Minutes per session: Not on file    Stress: Not on file   Relationships    Social connections:     Talks on phone: Not on file     Gets together: Not on file     Attends J[employer] service: Not on file     Active member of club or organization: Not on file     Attends meetings of clubs or organizations: Not on file     Relationship status: Not on file   Other Topics Concern    Not on file   Social History Narrative    Not on file       Past Surgical History:   Procedure Laterality Date     "COLONOSCOPY N/A 10/26/2017    Procedure: COLONOSCOPY;  Surgeon: Heladio Gonzalez MD;  Location: [facility];  Service: Endoscopy;  Laterality: N/A;    HYSTERECTOMY  1980    OOPHORECTOMY      TONSILLECTOMY         Review of Systems   Constitutional: Negative for chills, fatigue, fever and unexpected weight change.   HENT: Positive for congestion, postnasal drip, rhinorrhea and sneezing. Negative for ear pain, sinus pressure, sinus pain and sore throat.    Eyes: Negative for photophobia, redness and visual disturbance.   Respiratory: Negative for chest tightness and shortness of breath.    Cardiovascular: Negative for chest pain, palpitations and leg swelling.   Gastrointestinal: Negative for abdominal pain, nausea and vomiting.   Skin: Negative for pallor.   Neurological: Positive for headaches. Negative for dizziness, tremors, seizures, syncope, weakness and numbness.   Hematological: Does not bruise/bleed easily.   All other systems reviewed and are negative.      Objective:   BP (!) 130/90 (BP Location: Right arm, Patient Position: Sitting, BP Method: Large (Manual))   Pulse 79   Temp 98.2 °F (36.8 °C) (Oral)   Ht 5' 2" (1.575 m)   Wt 65.9 kg (145 lb 4.5 oz)   SpO2 98%   BMI 26.57 kg/m²      Physical Exam   Constitutional: She is oriented to person, place, and time. She appears well-developed and well-nourished.   HENT:   Head: Normocephalic and atraumatic.   Right Ear: Hearing, tympanic membrane, external ear and ear canal normal.   Left Ear: Hearing, external ear and ear canal normal. A middle ear effusion is present.   Nose: Rhinorrhea present. No mucosal edema. No epistaxis. Right sinus exhibits maxillary sinus tenderness. Right sinus exhibits no frontal sinus tenderness. Left sinus exhibits maxillary sinus tenderness. Left sinus exhibits no frontal sinus tenderness.   Mouth/Throat: No oropharyngeal exudate, posterior oropharyngeal edema or posterior oropharyngeal erythema.   + PND   Neck: Normal " carotid pulses and no JVD present. Carotid bruit is not present.   Cardiovascular: Normal rate, regular rhythm and normal heart sounds.   Pulmonary/Chest: Effort normal and breath sounds normal. No respiratory distress. She has no decreased breath sounds. She has no wheezes. She has no rhonchi. She has no rales.   Lymphadenopathy:     She has no axillary adenopathy.   Neurological: She is alert and oriented to person, place, and time.   Skin: Skin is warm, dry and intact. She is not diaphoretic. No pallor.   Psychiatric: She has a normal mood and affect. Her speech is normal and behavior is normal.       Assessment:       1. URI with cough and congestion    2. Need for pneumococcal vaccination    3. Need for influenza vaccination        Plan:       Meera was seen today for uri.    Diagnoses and all orders for this visit:    URI with cough and congestion  -     fluticasone propionate (FLONASE) 50 mcg/actuation nasal spray; 2 sprays (100 mcg total) by Each Nostril route once daily. for 10 days  -     levocetirizine (XYZAL) 5 MG tablet; Take 1 tablet (5 mg total) by mouth nightly as needed for Allergies.  -     promethazine-dextromethorphan (PROMETHAZINE-DM) 6.25-15 mg/5 mL Syrp; Take 5 mLs by mouth every 12 (twelve) hours as needed.  -     methylPREDNISolone (MEDROL DOSEPACK) 4 mg tablet; use as directed  -     Discussed diagnosis and treatment of URI.   -     Suggested symptomatic OTC remedies.   -     Increase your water intake to 64-80 oz daily to help thin mucus  -     Nasal Saline spray (Over the counter [place]o spray or Ayr)  2 sprays each nostril 2-3 times a day for nasal congestion  -     Tylenol 500 mg 2 tablets or Ibuprofen 200 mg 2 tablets every 4-6 hours as needed for fever, headaches, sore throat, ear pain, bodyaches, and/or nasal/sinus inflammation  -     Warm salt water gargles with 1/2 cup water and 1 tablespoon salt every 4 hours  -     Warm tea with honey and lemon    Need for pneumococcal  vaccination  -     (In Office Administered) Pneumococcal Polysaccharide Vaccine (23 Valent) (SQ/IM)    Need for influenza vaccination  -     Influenza - High Dose (65+) (PF) (IM)      Follow up if symptoms worsen or fail to improve.

## 2019-10-16 NOTE — PATIENT INSTRUCTIONS
Viral Upper Respiratory Illness (Adult)  You have a viral upper respiratory illness (URI), which is another term for the common cold. This illness is contagious during the first few days. It is spread through the air by coughing and sneezing. It may also be spread by direct contact (touching the sick person and then touching your own eyes, nose, or mouth). Frequent handwashing will decrease risk of spread. Most viral illnesses go away within 7 to 10 days with rest and simple home remedies. Sometimes the illness may last for several weeks. Antibiotics will not kill a virus, and they are generally not prescribed for this condition.    Home care  · If symptoms are severe, rest at home for the first 2 to 3 days. When you resume activity, don't let yourself get too tired.  · Avoid being exposed to cigarette smoke (yours or others).  · You may use acetaminophen or ibuprofen to control pain and fever, unless another medicine was prescribed. (Note: If you have chronic liver or kidney disease, have ever had a stomach ulcer or gastrointestinal bleeding, or are taking blood-thinning medicines, talk with your healthcare provider before using these medicines.) Aspirin should never be given to anyone under 18 years of age who is ill with a viral infection or fever. It may cause severe liver or brain damage.  · Your appetite may be poor, so a light diet is fine. Avoid dehydration by drinking 6 to 8 glasses of fluids per day (water, soft drinks, juices, tea, or soup). Extra fluids will help loosen secretions in the nose and lungs.  · Over-the-counter cold medicines will not shorten the length of time youre sick, but they may be helpful for the following symptoms: cough, sore throat, and nasal and sinus congestion. (Note: Do not use decongestants if you have high blood pressure.)  Follow-up care  Follow up with your healthcare provider, or as advised.  When to seek medical advice  Call your healthcare provider right away if any  of these occur:  · Cough with lots of colored sputum (mucus)  · Severe headache; face, neck, or ear pain  · Difficulty swallowing due to throat pain  · Fever of 100.4°F (38°C)  Call 911, or get immediate medical care  Call emergency services right away if any of these occur:  · Chest pain, shortness of breath, wheezing, or difficulty breathing  · Coughing up blood  · Inability to swallow due to throat pain  Date Last Reviewed: 9/13/2015  © 7828-1317 [employer]. [address] [place] [address]. All rights reserved. This information is not intended as a substitute for professional medical care. Always follow your healthcare professional's instructions.

## 2019-10-30 ENCOUNTER — TELEPHONE (OUTPATIENT)
Dept: FAMILY MEDICINE | Facility: CLINIC | Age: 69
End: 2019-10-30

## 2019-10-30 DIAGNOSIS — Z12.31 ENCOUNTER FOR SCREENING MAMMOGRAM FOR BREAST CANCER: Primary | ICD-10-CM

## 2019-10-30 NOTE — TELEPHONE ENCOUNTER
Patient requesting mammogram.  Health maintenance states 11/1/2020 next mammogram    Last mammogram 11/2/18  states needs follow up 11/1/19    Please advise.

## 2019-10-30 NOTE — TELEPHONE ENCOUNTER
----- Message from Dev Swain sent at 10/30/2019  9:48 AM CDT -----  Contact: Meera [phone]  Type:  Mammogram    Caller is requesting to schedule their annual mammogram appointment.  Order is not listed in EPIC.  Please enter order and contact patient to schedule.    Name of Caller: Meera     Where would they like the mammogram performed? [facility]     Would the patient rather a call back or a response via [facility]? Call back     Best Call Back Number: [phone]

## 2019-11-07 PROBLEM — E78.49 OTHER HYPERLIPIDEMIA: Status: ACTIVE | Noted: 2017-10-03

## 2019-11-11 ENCOUNTER — HOSPITAL ENCOUNTER (OUTPATIENT)
Dept: RADIOLOGY | Facility: HOSPITAL | Age: 69
Discharge: HOME | End: 2019-11-11
Attending: FAMILY MEDICINE
Payer: MEDICARE

## 2019-11-11 VITALS — HEIGHT: 62 IN | WEIGHT: 145.31 LBS | BODY MASS INDEX: 26.74 KG/M2

## 2019-11-11 DIAGNOSIS — Z12.31 ENCOUNTER FOR SCREENING MAMMOGRAM FOR BREAST CANCER: ICD-10-CM

## 2019-11-11 PROCEDURE — 77063 BREAST TOMOSYNTHESIS BI: CPT | Mod: 26,,, | Performed by: RADIOLOGY

## 2019-11-11 PROCEDURE — 77067 SCR MAMMO BI INCL CAD: CPT | Mod: 26,,, | Performed by: RADIOLOGY

## 2019-11-11 PROCEDURE — 77067 MAMMO DIGITAL SCREENING BILAT WITH TOMOSYNTHESIS_CAD: ICD-10-PCS | Mod: 26,,, | Performed by: RADIOLOGY

## 2019-11-11 PROCEDURE — 77063 BREAST TOMOSYNTHESIS BI: CPT | Mod: TC,PO

## 2019-11-11 PROCEDURE — 77063 MAMMO DIGITAL SCREENING BILAT WITH TOMOSYNTHESIS_CAD: ICD-10-PCS | Mod: 26,,, | Performed by: RADIOLOGY

## 2020-02-06 ENCOUNTER — OFFICE VISIT (OUTPATIENT)
Dept: FAMILY MEDICINE | Facility: CLINIC | Age: 70
End: 2020-02-06
Payer: MEDICARE

## 2020-02-06 VITALS
WEIGHT: 153 LBS | RESPIRATION RATE: 17 BRPM | HEIGHT: 62 IN | SYSTOLIC BLOOD PRESSURE: 133 MMHG | BODY MASS INDEX: 28.16 KG/M2 | TEMPERATURE: 98 F | OXYGEN SATURATION: 97 % | DIASTOLIC BLOOD PRESSURE: 78 MMHG | HEART RATE: 91 BPM

## 2020-02-06 DIAGNOSIS — J01.90 ACUTE BACTERIAL SINUSITIS: Primary | ICD-10-CM

## 2020-02-06 DIAGNOSIS — B96.89 ACUTE BACTERIAL SINUSITIS: Primary | ICD-10-CM

## 2020-02-06 PROCEDURE — 99999 PR PBB SHADOW E&M-EST. PATIENT-LVL III: ICD-10-PCS | Mod: PBBFAC,,, | Performed by: NURSE PRACTITIONER

## 2020-02-06 PROCEDURE — 1126F PR PAIN SEVERITY QUANTIFIED, NO PAIN PRESENT: ICD-10-PCS | Mod: S$GLB,,, | Performed by: NURSE PRACTITIONER

## 2020-02-06 PROCEDURE — 1101F PT FALLS ASSESS-DOCD LE1/YR: CPT | Mod: S$GLB,,, | Performed by: NURSE PRACTITIONER

## 2020-02-06 PROCEDURE — 99214 PR OFFICE/OUTPT VISIT, EST, LEVL IV, 30-39 MIN: ICD-10-PCS | Mod: S$GLB,,, | Performed by: NURSE PRACTITIONER

## 2020-02-06 PROCEDURE — 99999 PR PBB SHADOW E&M-EST. PATIENT-LVL III: CPT | Mod: PBBFAC,,, | Performed by: NURSE PRACTITIONER

## 2020-02-06 PROCEDURE — 99214 OFFICE O/P EST MOD 30 MIN: CPT | Mod: S$GLB,,, | Performed by: NURSE PRACTITIONER

## 2020-02-06 PROCEDURE — 1126F AMNT PAIN NOTED NONE PRSNT: CPT | Mod: S$GLB,,, | Performed by: NURSE PRACTITIONER

## 2020-02-06 PROCEDURE — 1159F PR MEDICATION LIST DOCUMENTED IN MEDICAL RECORD: ICD-10-PCS | Mod: S$GLB,,, | Performed by: NURSE PRACTITIONER

## 2020-02-06 PROCEDURE — 1159F MED LIST DOCD IN RCRD: CPT | Mod: S$GLB,,, | Performed by: NURSE PRACTITIONER

## 2020-02-06 PROCEDURE — 1101F PR PT FALLS ASSESS DOC 0-1 FALLS W/OUT INJ PAST YR: ICD-10-PCS | Mod: S$GLB,,, | Performed by: NURSE PRACTITIONER

## 2020-02-06 RX ORDER — GUAIFENESIN/DEXTROMETHORPHAN 100-10MG/5
5 SYRUP ORAL EVERY 8 HOURS PRN
Qty: 118 ML | Refills: 0 | Status: SHIPPED | OUTPATIENT
Start: 2020-02-06 | End: 2020-02-16

## 2020-02-06 RX ORDER — AMOXICILLIN AND CLAVULANATE POTASSIUM 875; 125 MG/1; MG/1
1 TABLET, FILM COATED ORAL EVERY 12 HOURS
Qty: 20 TABLET | Refills: 0 | Status: SHIPPED | OUTPATIENT
Start: 2020-02-06 | End: 2020-02-16

## 2020-02-06 NOTE — PATIENT INSTRUCTIONS
Sinusitis (Antibiotic Treatment)    The sinuses are air-filled spaces within the bones of the face. They connect to the inside of the nose. Sinusitis is an inflammation of the tissue lining the sinus cavity. Sinus inflammation can occur during a cold. It can also be due to allergies to pollens and other particles in the air. Sinusitis can cause symptoms of sinus congestion and fullness. A sinus infection causes fever, headache and facial pain. There is often green or yellow drainage from the nose or into the back of the throat (post-nasal drip). You have been given antibiotics to treat this condition.  Home care:  · Take the full course of antibiotics as instructed. Do not stop taking them, even if you feel better.  · Drink plenty of water, hot tea, and other liquids. This may help thin mucus. It also may promote sinus drainage.  · Heat may help soothe painful areas of the face. Use a towel soaked in hot water. Or,  the shower and direct the hot spray onto your face. Using a vaporizer along with a menthol rub at night may also help.   · An expectorant containing guaifenesin may help thin the mucus and promote drainage from the sinuses.  · Over-the-counter decongestants may be used unless a similar medicine was prescribed. Nasal sprays work the fastest. Use one that contains phenylephrine or oxymetazoline. First blow the nose gently. Then use the spray. Do not use these medicines more often than directed on the label or symptoms may get worse. You may also use tablets containing pseudoephedrine. Avoid products that combine ingredients, because side effects may be increased. Read labels. You can also ask the pharmacist for help. (NOTE: Persons with high blood pressure should not use decongestants. They can raise blood pressure.)  · Over-the-counter antihistamines may help if allergies contributed to your sinusitis.    · Do not use nasal rinses or irrigation during an acute sinus infection, unless told to by  your health care provider. Rinsing may spread the infection to other sinuses.  · Use acetaminophen or ibuprofen to control pain, unless another pain medicine was prescribed. (If you have chronic liver or kidney disease or ever had a stomach ulcer, talk with your doctor before using these medicines. Aspirin should never be used in anyone under 18 years of age who is ill with a fever. It may cause severe liver damage.)  · Don't smoke. This can worsen symptoms.  Follow-up care  Follow up with your healthcare provider or our staff if you are not improving within the next week.  When to seek medical advice  Call your healthcare provider if any of these occur:  · Facial pain or headache becoming more severe  · Stiff neck  · Unusual drowsiness or confusion  · Swelling of the forehead or eyelids  · Vision problems, including blurred or double vision  · Fever of 100.4ºF (38ºC) or higher, or as directed by your healthcare provider  · Seizure  · Breathing problems  · Symptoms not resolving within 10 days  Date Last Reviewed: 4/13/2015  © 6712-1891 [employer]. [address] [place] [address], [place] [address]. All rights reserved. This information is not intended as a substitute for professional medical care. Always follow your healthcare professional's instructions.

## 2020-02-06 NOTE — PROGRESS NOTES
Routine Office Visit    Patient Name: Meera Britt    : 1950  MRN: [id number]    Chief Complaint:  Sinusitis    Subjective:  Meera is a 69 y.o. female who presents today for:    1.  Sinusitis - patient reports today for evaluation of potential sinusitis.  She states that for the last 8-9 days she has been dealing with nasal congestion, chest congestion, sinus pain/pressure, runny nose, sneezing, and sinus headaches.  She states that the symptoms started while she was at a hunting camp but have worsened since.  She has tried DayQuil and throat lozenges for the symptoms which has not helped.  She has tried Mucinex however for the chest congestion which is helping.  She denies any shortness of breath, wheezing, chest pain, or palpitations.  Denies any nausea, vomiting, diarrhea, or abdominal pain. Denies any chills or fevers.  She has gotten the flu shot this year.  She does not smoke.    Past Medical History  Past Medical History:   Diagnosis Date    Allergy     Arthritis     Endometrial cancer     GERD (gastroesophageal reflux disease)     Hyperlipidemia     Thyroid disease        Past Surgical History  Past Surgical History:   Procedure Laterality Date    COLONOSCOPY N/A 10/26/2017    Procedure: COLONOSCOPY;  Surgeon: Heladio Gonzalez MD;  Location: [facility];  Service: Endoscopy;  Laterality: N/A;    HYSTERECTOMY      OOPHORECTOMY      TONSILLECTOMY         Family History  History reviewed. No pertinent family history.    Social History  Social History     Socioeconomic History    Marital status:      Spouse name: Not on file    Number of children: Not on file    Years of education: Not on file    Highest education level: Not on file   Occupational History    Not on file   Social Needs    Financial resource strain: Not on file    Food insecurity:     Worry: Not on file     Inability: Not on file    Transportation needs:     Medical: Not on file     Non-medical: Not on file    Tobacco Use    Smoking status: Never Smoker    Smokeless tobacco: Never Used   Substance and Sexual Activity    Alcohol use: No    Drug use: Not on file    Sexual activity: Not on file   Lifestyle    Physical activity:     Days per week: Not on file     Minutes per session: Not on file    Stress: Not on file   Relationships    Social connections:     Talks on phone: Not on file     Gets together: Not on file     Attends [employer] service: Not on file     Active member of club or organization: Not on file     Attends meetings of clubs or organizations: Not on file     Relationship status: Not on file   Other Topics Concern    Not on file   Social History Narrative    Not on file       Current Medications  Current Outpatient Medications on File Prior to Visit   Medication Sig Dispense Refill    ACETAMINOPHEN/DIPHENHYDRAMINE (TYLENOL SEVERE ALLERGY ORAL) Take by mouth.      doxylam/PE/DM/acetaminophen/GG (VICKS DAYQUIL-NYQUIL SEVERE ORAL) Take by mouth.      fenofibrate 160 MG Tab Take 1 tablet (160 mg total) by mouth once daily. 90 tablet 1    flaxseed oil Oil 1,200 mg by Misc.(Non-Drug; Combo Route) route 2 (two) times daily.      magnesium oxide 400 mg Cap Take 1 capsule by mouth once.      methIMAzole (TAPAZOLE) 5 MG Tab Take 1 tablet (5 mg total) by mouth once daily. 90 tablet 1    pantoprazole (PROTONIX) 20 MG tablet Take 20 mg by mouth once daily.  1    levocetirizine (XYZAL) 5 MG tablet Take 1 tablet (5 mg total) by mouth nightly as needed for Allergies. 30 tablet 0     No current facility-administered medications on file prior to visit.        Allergies   Review of patient's allergies indicates:   Allergen Reactions    Iodine and iodide containing products      bloating    Sulfa (sulfonamide antibiotics) Nausea And Vomiting       Review of Systems (Pertinent positives)  Review of Systems   Constitutional: Positive for malaise/fatigue. Negative for chills, diaphoresis, fever and weight  "loss.   HENT: Positive for congestion, sinus pain and sore throat. Negative for ear discharge, ear pain, hearing loss, nosebleeds and tinnitus.    Eyes: Negative.    Respiratory: Positive for cough and sputum production. Negative for hemoptysis, shortness of breath, wheezing and stridor.    Cardiovascular: Negative.    Gastrointestinal: Negative.    Genitourinary: Negative.    Musculoskeletal: Negative.    Skin: Negative.    Neurological: Positive for headaches. Negative for dizziness, tingling, tremors, sensory change and speech change.   Endo/Heme/Allergies: Negative.    Psychiatric/Behavioral: Negative.        /78 (BP Location: Left arm, Patient Position: Sitting, BP Method: Medium (Automatic))   Pulse 91   Temp 98.2 °F (36.8 °C) (Oral)   Resp 17   Ht 5' 2.01" (1.575 m)   Wt 69.4 kg (153 lb)   SpO2 97%   BMI 27.98 kg/m²     Physical Exam   Constitutional: She is oriented to person, place, and time. She appears well-developed and well-nourished.  Non-toxic appearance. She does not have a sickly appearance. She does not appear ill. No distress.   HENT:   Head: Normocephalic and atraumatic.   Right Ear: Tympanic membrane, external ear and ear canal normal.   Left Ear: Tympanic membrane, external ear and ear canal normal.   Nose: Mucosal edema and rhinorrhea present. Right sinus exhibits frontal sinus tenderness. Right sinus exhibits no maxillary sinus tenderness. Left sinus exhibits frontal sinus tenderness. Left sinus exhibits no maxillary sinus tenderness.   Mouth/Throat: Uvula is midline and mucous membranes are normal. Posterior oropharyngeal erythema present. No oropharyngeal exudate, posterior oropharyngeal edema or tonsillar abscesses. Tonsils are 0 on the right. Tonsils are 0 on the left. No tonsillar exudate.   Eyes: Pupils are equal, round, and reactive to light. Conjunctivae and EOM are normal.   Neck: Normal range of motion. Neck supple. No JVD present.   Cardiovascular: Normal rate, regular " rhythm, normal heart sounds and intact distal pulses. Exam reveals no gallop and no friction rub.   No murmur heard.  Pulmonary/Chest: Effort normal and breath sounds normal. No stridor. No respiratory distress. She has no wheezes. She has no rales. She exhibits no tenderness.   Abdominal: Soft. Bowel sounds are normal.   Lymphadenopathy:     She has no cervical adenopathy.   Neurological: She is alert and oriented to person, place, and time.   Skin: Skin is warm and dry. Capillary refill takes less than 2 seconds. She is not diaphoretic.   Vitals reviewed.       Assessment/Plan:  Meera Britt is a 69 y.o. female who presents today for :    Meera was seen today for sinusitis.    Diagnoses and all orders for this visit:    Acute bacterial sinusitis  -     amoxicillin-clavulanate 875-125mg (AUGMENTIN) 875-125 mg per tablet; Take 1 tablet by mouth every 12 (twelve) hours. for 10 days  -     dextromethorphan-guaifenesin  mg/5 ml (ROBITUSSIN-DM)  mg/5 mL liquid; Take 5 mLs by mouth every 8 (eight) hours as needed.     Given length of symptoms will treat with antibiotics at this time.  Robitussin DM for symptoms.  Instructed patient to drink plenty of water.  Other over-the-counter home care remedies were discussed with the patient, including warm salt water gargles or tea with honey, nasal saline rinses.  Follow-up if symptoms worsen or fail to improve.  Patient verbalized understanding of instructions.        This office note has been dictated.  This dictation has been generated using M-Modal Fluency Direct dictation; some phonetic errors may occur.   My collaborating physician is Dr. Ky Allison.

## 2020-02-10 DIAGNOSIS — E05.90 HYPERTHYROIDISM: ICD-10-CM

## 2020-02-10 DIAGNOSIS — E78.00 PURE HYPERCHOLESTEROLEMIA: ICD-10-CM

## 2020-02-10 RX ORDER — FENOFIBRATE 160 MG/1
TABLET ORAL
Qty: 90 TABLET | Refills: 1 | Status: SHIPPED | OUTPATIENT
Start: 2020-02-10 | End: 2020-08-07

## 2020-02-10 RX ORDER — METHIMAZOLE 5 MG/1
TABLET ORAL
Qty: 90 TABLET | Refills: 1 | Status: SHIPPED | OUTPATIENT
Start: 2020-02-10 | End: 2020-08-07

## 2020-02-20 ENCOUNTER — OFFICE VISIT (OUTPATIENT)
Dept: URGENT CARE | Facility: CLINIC | Age: 70
End: 2020-02-20
Payer: MEDICARE

## 2020-02-20 VITALS
SYSTOLIC BLOOD PRESSURE: 150 MMHG | RESPIRATION RATE: 18 BRPM | DIASTOLIC BLOOD PRESSURE: 81 MMHG | OXYGEN SATURATION: 98 % | WEIGHT: 150 LBS | TEMPERATURE: 97 F | HEART RATE: 90 BPM | HEIGHT: 60 IN | BODY MASS INDEX: 29.45 KG/M2

## 2020-02-20 DIAGNOSIS — B96.89 BACTERIAL SINUSITIS: Primary | ICD-10-CM

## 2020-02-20 DIAGNOSIS — J32.9 BACTERIAL SINUSITIS: Primary | ICD-10-CM

## 2020-02-20 PROCEDURE — 99214 OFFICE O/P EST MOD 30 MIN: CPT | Mod: S$GLB,,, | Performed by: PHYSICIAN ASSISTANT

## 2020-02-20 PROCEDURE — 99214 PR OFFICE/OUTPT VISIT, EST, LEVL IV, 30-39 MIN: ICD-10-PCS | Mod: S$GLB,,, | Performed by: PHYSICIAN ASSISTANT

## 2020-02-20 RX ORDER — DEXAMETHASONE SODIUM PHOSPHATE 100 MG/10ML
8 INJECTION INTRAMUSCULAR; INTRAVENOUS
Status: SHIPPED | OUTPATIENT
Start: 2020-02-20

## 2020-02-20 RX ORDER — DOXYCYCLINE HYCLATE 100 MG
100 TABLET ORAL 2 TIMES DAILY
Qty: 20 TABLET | Refills: 0 | Status: SHIPPED | OUTPATIENT
Start: 2020-02-20 | End: 2020-03-01

## 2020-02-20 NOTE — PROGRESS NOTES
Subjective:       Patient ID: Meera Britt is a 69 y.o. female.    Vitals:  height is 5' (1.524 m) and weight is 68 kg (150 lb). Her temperature is 97.4 °F (36.3 °C). Her blood pressure is 150/81 (abnormal) and her pulse is 90. Her respiration is 18 and oxygen saturation is 98%.     Chief Complaint: URI    Pt was seen at a doctor and was DX with an uri she was given Augmentin and promethazine DM for ten days. She finished the medication and has not got any better. Pt is having sinus issues. Cough, congestion, with sinus puressure.  Afebrile.  Denies any chest pain, shortness of breath or wheeze.  Denies any history of asthma or COPD.    URI    This is a new problem. The current episode started 1 to 4 weeks ago. There has been no fever. Associated symptoms include congestion, coughing, rhinorrhea and sinus pain. Pertinent negatives include no chest pain, diarrhea, dysuria, headaches, nausea, rash, sore throat or vomiting.       Constitution: Negative for chills, fatigue and fever.   HENT: Positive for congestion, postnasal drip, sinus pain and sinus pressure. Negative for sore throat.    Neck: Negative for painful lymph nodes.   Cardiovascular: Negative for chest pain and leg swelling.   Eyes: Negative for double vision and blurred vision.   Respiratory: Positive for cough. Negative for shortness of breath.    Gastrointestinal: Negative for nausea, vomiting and diarrhea.   Genitourinary: Negative for dysuria, frequency, urgency and history of kidney stones.   Musculoskeletal: Negative for joint pain, joint swelling, muscle cramps and muscle ache.   Skin: Negative for color change, pale, rash and bruising.   Allergic/Immunologic: Negative for seasonal allergies.   Neurological: Negative for dizziness, history of vertigo, light-headedness, passing out and headaches.   Hematologic/Lymphatic: Negative for swollen lymph nodes.   Psychiatric/Behavioral: Negative for nervous/anxious, sleep disturbance and depression.  The patient is not nervous/anxious.        Objective:      Physical Exam   Constitutional: She is oriented to person, place, and time. She appears well-developed and well-nourished. She is cooperative.  Non-toxic appearance. She does not have a sickly appearance. She does not appear ill. No distress.   HENT:   Head: Normocephalic and atraumatic.   Right Ear: Hearing, tympanic membrane, external ear and ear canal normal. Tympanic membrane is not erythematous and not bulging.   Left Ear: Hearing, tympanic membrane, external ear and ear canal normal. Tympanic membrane is not erythematous and not bulging.   Nose: Mucosal edema and rhinorrhea present. No nasal deformity. No epistaxis. Right sinus exhibits maxillary sinus tenderness and frontal sinus tenderness. Left sinus exhibits maxillary sinus tenderness and frontal sinus tenderness.   Mouth/Throat: Uvula is midline and mucous membranes are normal. No trismus in the jaw. Normal dentition. No uvula swelling. Posterior oropharyngeal erythema and cobblestoning present. No oropharyngeal exudate or posterior oropharyngeal edema. Tonsils are 0 on the right. Tonsils are 0 on the left. No tonsillar exudate.   Eyes: Conjunctivae and lids are normal. Right eye exhibits no discharge. Left eye exhibits no discharge. No scleral icterus.   Neck: Trachea normal, full passive range of motion without pain and phonation normal. Neck supple. No neck rigidity. No edema and no erythema present.   Cardiovascular: Normal rate, regular rhythm, normal heart sounds, intact distal pulses and normal pulses. Exam reveals no gallop and no friction rub.   No murmur heard.  Pulmonary/Chest: Effort normal and breath sounds normal. No stridor. No respiratory distress. She has no decreased breath sounds. She has no wheezes. She has no rhonchi. She has no rales.   Abdominal: Normal appearance.   Musculoskeletal: Normal range of motion. She exhibits no edema or deformity.   Lymphadenopathy:        Head  (right side): No submandibular and no tonsillar adenopathy present.        Head (left side): No submandibular and no tonsillar adenopathy present.     She has cervical adenopathy.        Right cervical: Superficial cervical adenopathy present. No posterior cervical adenopathy present.       Left cervical: Superficial cervical adenopathy present. No posterior cervical adenopathy present.   Neurological: She is alert and oriented to person, place, and time. She exhibits normal muscle tone. Coordination normal.   Skin: Skin is warm, dry, intact, not diaphoretic and not pale.   Psychiatric: She has a normal mood and affect. Her speech is normal and behavior is normal. Judgment and thought content normal. Cognition and memory are normal.   Nursing note and vitals reviewed.        Assessment:       1. Bacterial sinusitis        Plan:     Bilateral sinus TTP and length of symptoms warrants treatment of bacterial sinusitis with antibiotic at this time. Monitor for fever and if fever presents, take Tylenol or Ibuprofen for resolution. Use Flonase and Zyrtec for resolution of post-nassl drip and sinus inflammation. Get lots of rest and drink plenty of fluids to maintain good hydration. Follow-up with primary care provider should symptoms persist or worsen.       Bacterial sinusitis  -     doxycycline (VIBRA-TABS) 100 MG tablet; Take 1 tablet (100 mg total) by mouth 2 (two) times daily. for 10 days  Dispense: 20 tablet; Refill: 0  -     dexamethasone injection 8 mg      Patient Instructions     Sinusitis (Antibiotic Treatment)    The sinuses are air-filled spaces within the bones of the face. They connect to the inside of the nose. Sinusitis is an inflammation of the tissue lining the sinus cavity. Sinus inflammation can occur during a cold. It can also be due to allergies to pollens and other particles in the air. Sinusitis can cause symptoms of sinus congestion and fullness. A sinus infection causes fever, headache and  facial pain. There is often green or yellow drainage from the nose or into the back of the throat (post-nasal drip). You have been given antibiotics to treat this condition.  Home care:  · Take the full course of antibiotics as instructed. Do not stop taking them, even if you feel better.  · Drink plenty of water, hot tea, and other liquids. This may help thin mucus. It also may promote sinus drainage.  · Heat may help soothe painful areas of the face. Use a towel soaked in hot water. Or,  the shower and direct the hot spray onto your face. Using a vaporizer along with a menthol rub at night may also help.   · An expectorant containing guaifenesin may help thin the mucus and promote drainage from the sinuses.  · Over-the-counter decongestants may be used unless a similar medicine was prescribed. Nasal sprays work the fastest. Use one that contains phenylephrine or oxymetazoline. First blow the nose gently. Then use the spray. Do not use these medicines more often than directed on the label or symptoms may get worse. You may also use tablets containing pseudoephedrine. Avoid products that combine ingredients, because side effects may be increased. Read labels. You can also ask the pharmacist for help. (NOTE: Persons with high blood pressure should not use decongestants. They can raise blood pressure.)  · Over-the-counter antihistamines may help if allergies contributed to your sinusitis.    · Do not use nasal rinses or irrigation during an acute sinus infection, unless told to by your health care provider. Rinsing may spread the infection to other sinuses.  · Use acetaminophen or ibuprofen to control pain, unless another pain medicine was prescribed. (If you have chronic liver or kidney disease or ever had a stomach ulcer, talk with your doctor before using these medicines. Aspirin should never be used in anyone under 18 years of age who is ill with a fever. It may cause severe liver damage.)  · Don't smoke.  This can worsen symptoms.  Follow-up care  Follow up with your healthcare provider or our staff if you are not improving within the next week.  When to seek medical advice  Call your healthcare provider if any of these occur:  · Facial pain or headache becoming more severe  · Stiff neck  · Unusual drowsiness or confusion  · Swelling of the forehead or eyelids  · Vision problems, including blurred or double vision  · Fever of 100.4ºF (38ºC) or higher, or as directed by your healthcare provider  · Seizure  · Breathing problems  · Symptoms not resolving within 10 days  Date Last Reviewed: 4/13/2015  © 5704-0997 [employer]. [address] [place] [address], [place] [address]. All rights reserved. This information is not intended as a substitute for professional medical care. Always follow your healthcare professional's instructions.      You were given a steroid injection today. Risks and benefits were discussed with you.   If you have diabetes this injection will raise your blood sugar. This will normalize over the next 2-3 days. Please make sure you monitor you blood sugar accordingly.   This medication can also increase you blood pressure. Please monitor your blood pressure as discussed.   Please keep in mind that you should not take long term steroids unless prescribed by your physician. You should not exceed 4 steroid injections in a year and if you have multiple injections they should be spaced out adequately. Long term side effects and risks can occur as mentioned at todays visit.     Please follow up with your Primary care provider within 2-5 days if your signs and symptoms have not resolved or worsen.     If your condition worsens or fails to improve we recommend that you receive another evaluation at the emergency room immediately or contact your primary medical clinic to discuss your concerns.   You must understand that you have received an Urgent Care treatment only and that you may be released before  all of your medical problems are known or treated. You, the patient, will arrange for follow up care as instructed.     RED FLAGS/WARNING SYMPTOMS DISCUSSED WITH PATIENT THAT WOULD WARRANT EMERGENT MEDICAL ATTENTION. PATIENT VERBALIZED UNDERSTANDING.

## 2020-02-20 NOTE — PATIENT INSTRUCTIONS
Sinusitis (Antibiotic Treatment)    The sinuses are air-filled spaces within the bones of the face. They connect to the inside of the nose. Sinusitis is an inflammation of the tissue lining the sinus cavity. Sinus inflammation can occur during a cold. It can also be due to allergies to pollens and other particles in the air. Sinusitis can cause symptoms of sinus congestion and fullness. A sinus infection causes fever, headache and facial pain. There is often green or yellow drainage from the nose or into the back of the throat (post-nasal drip). You have been given antibiotics to treat this condition.  Home care:  · Take the full course of antibiotics as instructed. Do not stop taking them, even if you feel better.  · Drink plenty of water, hot tea, and other liquids. This may help thin mucus. It also may promote sinus drainage.  · Heat may help soothe painful areas of the face. Use a towel soaked in hot water. Or,  the shower and direct the hot spray onto your face. Using a vaporizer along with a menthol rub at night may also help.   · An expectorant containing guaifenesin may help thin the mucus and promote drainage from the sinuses.  · Over-the-counter decongestants may be used unless a similar medicine was prescribed. Nasal sprays work the fastest. Use one that contains phenylephrine or oxymetazoline. First blow the nose gently. Then use the spray. Do not use these medicines more often than directed on the label or symptoms may get worse. You may also use tablets containing pseudoephedrine. Avoid products that combine ingredients, because side effects may be increased. Read labels. You can also ask the pharmacist for help. (NOTE: Persons with high blood pressure should not use decongestants. They can raise blood pressure.)  · Over-the-counter antihistamines may help if allergies contributed to your sinusitis.    · Do not use nasal rinses or irrigation during an acute sinus infection, unless told to by  your health care provider. Rinsing may spread the infection to other sinuses.  · Use acetaminophen or ibuprofen to control pain, unless another pain medicine was prescribed. (If you have chronic liver or kidney disease or ever had a stomach ulcer, talk with your doctor before using these medicines. Aspirin should never be used in anyone under 18 years of age who is ill with a fever. It may cause severe liver damage.)  · Don't smoke. This can worsen symptoms.  Follow-up care  Follow up with your healthcare provider or our staff if you are not improving within the next week.  When to seek medical advice  Call your healthcare provider if any of these occur:  · Facial pain or headache becoming more severe  · Stiff neck  · Unusual drowsiness or confusion  · Swelling of the forehead or eyelids  · Vision problems, including blurred or double vision  · Fever of 100.4ºF (38ºC) or higher, or as directed by your healthcare provider  · Seizure  · Breathing problems  · Symptoms not resolving within 10 days  Date Last Reviewed: 4/13/2015  © 4877-1122 [employer]. [address] [place] [address]. All rights reserved. This information is not intended as a substitute for professional medical care. Always follow your healthcare professional's instructions.      You were given a steroid injection today. Risks and benefits were discussed with you.   If you have diabetes this injection will raise your blood sugar. This will normalize over the next 2-3 days. Please make sure you monitor you blood sugar accordingly.   This medication can also increase you blood pressure. Please monitor your blood pressure as discussed.   Please keep in mind that you should not take long term steroids unless prescribed by your physician. You should not exceed 4 steroid injections in a year and if you have multiple injections they should be spaced out adequately. Long term side effects and risks can occur as mentioned at todays  visit.     Please follow up with your Primary care provider within 2-5 days if your signs and symptoms have not resolved or worsen.     If your condition worsens or fails to improve we recommend that you receive another evaluation at the emergency room immediately or contact your primary medical clinic to discuss your concerns.   You must understand that you have received an Urgent Care treatment only and that you may be released before all of your medical problems are known or treated. You, the patient, will arrange for follow up care as instructed.     RED FLAGS/WARNING SYMPTOMS DISCUSSED WITH PATIENT THAT WOULD WARRANT EMERGENT MEDICAL ATTENTION. PATIENT VERBALIZED UNDERSTANDING.

## 2020-04-13 ENCOUNTER — NURSE TRIAGE (OUTPATIENT)
Dept: ADMINISTRATIVE | Facility: CLINIC | Age: 70
End: 2020-04-13

## 2020-04-13 NOTE — TELEPHONE ENCOUNTER
C/o cough x on/off since November. Denies worsening. Was told by person who contacted to tell  he is positive to call someone about getting tested. Denies wanting triage, just want test if necessary. OOC if fever >100.4, chest pain, or trouble breathing. Declined [email or web address] visit or going to testing site. Message to pcp. Would pcp order test?  got positive results yesterday.     Reason for Disposition   [1] Adult has symptoms of COVID-19 (fever, cough or SOB) AND [2] major community spread where patient lives AND [3] testing not being done for mild symptoms   [1] COVID-19 infection diagnosed or suspected AND [2] mild symptoms (fever, cough) AND [3] no trouble breathing or other complications    Additional Information   Negative: SEVERE difficulty breathing (e.g., struggling for each breath, speak in single words, bluish lips)   Negative: Sounds like a life-threatening emergency to the triager   Negative: [1] Adult has symptoms of COVID-19 (fever, cough, or SOB) AND [2] lab test positive   Negative: SEVERE difficulty breathing (e.g., struggling for each breath, speaks in single words)   Negative: Difficult to awaken or acting confused (e.g., disoriented, slurred speech)   Negative: Bluish (or gray) lips or face now   Negative: Shock suspected (e.g., cold/pale/clammy skin, too weak to stand, low BP, rapid pulse)   Negative: Sounds like a life-threatening emergency to the triager   Negative: [1] COVID-19 suspected (e.g., cough, fever, shortness of breath) AND [2] public health department recommends testing   Negative: COVID-19 and Breastfeeding, questions about   Negative: [1] COVID-19 exposure AND [2] no symptoms   Negative: SEVERE or constant chest pain (Exception: mild central chest pain, present only when coughing)   Negative: MODERATE difficulty breathing (e.g., speaks in phrases, SOB even at rest, pulse 100-120)   Negative: MILD difficulty breathing (e.g., minimal/no SOB  at rest, SOB with walking, pulse <100)   Negative: Chest pain   Negative: Patient sounds very sick or weak to the triager   Negative: Fever > 103 F (39.4 C)   Negative: [1] Fever > 101 F (38.3 C) AND [2] age > 60   Negative: [1] Fever > 100.0 F (37.8 C) AND [2] bedridden (e.g., nursing home patient, CVA, chronic illness, recovering from surgery)   Negative: HIGH RISK patient (e.g., age > 64 years, diabetes, heart or lung disease, weak immune system)   Negative: Fever present > 3 days (72 hours)   Negative: [1] Fever returns after gone for over 24 hours AND [2] symptoms worse or not improved   Negative: [1] Continuous (nonstop) coughing interferes with work or school AND [2] no improvement using cough treatment per protocol   Negative: Cough present > 3 weeks    Protocols used: CORONAVIRUS (COVID-19) EXPOSURE-A-OH, CORONAVIRUS (COVID-19) DIAGNOSED OR [id number]

## 2020-08-18 ENCOUNTER — OFFICE VISIT (OUTPATIENT)
Dept: FAMILY MEDICINE | Facility: CLINIC | Age: 70
End: 2020-08-18
Payer: MEDICARE

## 2020-08-18 VITALS
DIASTOLIC BLOOD PRESSURE: 70 MMHG | BODY MASS INDEX: 28.87 KG/M2 | WEIGHT: 147.06 LBS | TEMPERATURE: 99 F | SYSTOLIC BLOOD PRESSURE: 120 MMHG | HEART RATE: 65 BPM | OXYGEN SATURATION: 98 % | HEIGHT: 60 IN

## 2020-08-18 DIAGNOSIS — Z23 NEED FOR SHINGLES VACCINE: ICD-10-CM

## 2020-08-18 DIAGNOSIS — E78.49 OTHER HYPERLIPIDEMIA: ICD-10-CM

## 2020-08-18 DIAGNOSIS — Z00.00 ANNUAL PHYSICAL EXAM: Primary | ICD-10-CM

## 2020-08-18 DIAGNOSIS — E05.90 HYPERTHYROIDISM: ICD-10-CM

## 2020-08-18 PROCEDURE — 90750 HZV VACC RECOMBINANT IM: CPT | Mod: S$GLB,,, | Performed by: FAMILY MEDICINE

## 2020-08-18 PROCEDURE — 90471 IMMUNIZATION ADMIN: CPT | Mod: S$GLB,,, | Performed by: FAMILY MEDICINE

## 2020-08-18 PROCEDURE — 90750 ZOSTER RECOMBINANT VACCINE: ICD-10-PCS | Mod: S$GLB,,, | Performed by: FAMILY MEDICINE

## 2020-08-18 PROCEDURE — 90471 ZOSTER RECOMBINANT VACCINE: ICD-10-PCS | Mod: S$GLB,,, | Performed by: FAMILY MEDICINE

## 2020-08-18 PROCEDURE — 99397 PER PM REEVAL EST PAT 65+ YR: CPT | Mod: S$GLB,25,, | Performed by: FAMILY MEDICINE

## 2020-08-18 PROCEDURE — 99397 PR PREVENTIVE VISIT,EST,65 & OVER: ICD-10-PCS | Mod: S$GLB,25,, | Performed by: FAMILY MEDICINE

## 2020-08-18 PROCEDURE — 99999 PR PBB SHADOW E&M-EST. PATIENT-LVL III: CPT | Mod: PBBFAC,,, | Performed by: FAMILY MEDICINE

## 2020-08-18 PROCEDURE — 99999 PR PBB SHADOW E&M-EST. PATIENT-LVL III: ICD-10-PCS | Mod: PBBFAC,,, | Performed by: FAMILY MEDICINE

## 2020-08-18 NOTE — PROGRESS NOTES
Assessment & Plan  Problem List Items Addressed This Visit        Cardiac/Vascular    Other hyperlipidemia    Relevant Orders    Comprehensive metabolic panel    CBC auto differential    Hemoglobin A1C    Lipid Panel    TSH       Endocrine    Hyperthyroidism    Relevant Orders    Comprehensive metabolic panel    CBC auto differential    Hemoglobin A1C    Lipid Panel    TSH      Other Visit Diagnoses     Annual physical exam    -  Primary    Relevant Orders    Comprehensive metabolic panel    CBC auto differential    Hemoglobin A1C    Lipid Panel    TSH      I addressed all major concerns as it related to health maintenance.  All were ordered and scheduled based on the patients wishes.  Any additional health maintenance will be readdressed at the next physical if declined or deferred by the patient.        Health Maintenance reviewed.    Follow-up: No follow-ups on file.    ______________________________________________________________________    Chief Complaint  Chief Complaint   Patient presents with    Annual Exam       HPI  Meera Britt is a 69 y.o. female with multiple medical diagnoses as listed in the medical history and problem list that presents for annual exam.  Pt is known to me with last appointment 8/15/2019.    Patient denies any new symptoms including chest pain, SOB, blurry vision, N/V, diarrhea.        PAST MEDICAL HISTORY:  Past Medical History:   Diagnosis Date    Allergy     Arthritis     Endometrial cancer 1980    GERD (gastroesophageal reflux disease)     Hyperlipidemia     Thyroid disease        PAST SURGICAL HISTORY:  Past Surgical History:   Procedure Laterality Date    COLONOSCOPY N/A 10/26/2017    Procedure: COLONOSCOPY;  Surgeon: Heladio Gonzalez MD;  Location: [facility];  Service: Endoscopy;  Laterality: N/A;    HYSTERECTOMY  1980    OOPHORECTOMY      TONSILLECTOMY         SOCIAL HISTORY:  Social History     Socioeconomic History    Marital status:      Spouse  name: Not on file    Number of children: Not on file    Years of education: Not on file    Highest education level: Not on file   Occupational History    Not on file   Social Needs    Financial resource strain: Not on file    Food insecurity     Worry: Not on file     Inability: Not on file    Transportation needs     Medical: Not on file     Non-medical: Not on file   Tobacco Use    Smoking status: Never Smoker    Smokeless tobacco: Never Used   Substance and Sexual Activity    Alcohol use: No    Drug use: Not on file    Sexual activity: Not on file   Lifestyle    Physical activity     Days per week: Not on file     Minutes per session: Not on file    Stress: Not on file   Relationships    Social connections     Talks on phone: Not on file     Gets together: Not on file     Attends [employer] service: Not on file     Active member of club or organization: Not on file     Attends meetings of clubs or organizations: Not on file     Relationship status: Not on file   Other Topics Concern    Not on file   Social History Narrative    Not on file       FAMILY HISTORY:  No family history on file.    ALLERGIES AND MEDICATIONS: updated and reviewed.  Review of patient's allergies indicates:   Allergen Reactions    Iodine and iodide containing products      bloating    Sulfa (sulfonamide antibiotics) Nausea And Vomiting     Current Outpatient Medications   Medication Sig Dispense Refill    fenofibrate 160 MG Tab Take 1 tablet by mouth once daily 90 tablet 0    fexofenadine/pseudoephedrine (ALLERGY RELIEF D ORAL) Take by mouth.      flaxseed oil Oil 1,200 mg by Misc.(Non-Drug; Combo Route) route 2 (two) times daily.      magnesium oxide 400 mg Cap Take 1 capsule by mouth once.      methIMAzole (TAPAZOLE) 5 MG Tab Take 1 tablet by mouth once daily 90 tablet 0     Current Facility-Administered Medications   Medication Dose Route Frequency Provider Last Rate Last Dose    dexamethasone injection 8 mg  8 mg  Intramuscular 1 time in Clinic/HOD WALI Mandel  Review of Systems   Constitutional: Negative for activity change, appetite change, fatigue, fever and unexpected weight change.   HENT: Negative.  Negative for ear discharge, ear pain, rhinorrhea and sore throat.    Eyes: Negative.    Respiratory: Negative for apnea, cough, chest tightness, shortness of breath and wheezing.    Cardiovascular: Negative for chest pain, palpitations and leg swelling.   Gastrointestinal: Negative for abdominal distention, abdominal pain, constipation, diarrhea and vomiting.   Endocrine: Negative for cold intolerance, heat intolerance, polydipsia and polyuria.   Genitourinary: Negative for decreased urine volume and urgency.   Musculoskeletal: Negative.    Skin: Negative for rash.   Neurological: Negative for dizziness and headaches.   Hematological: Does not bruise/bleed easily.   Psychiatric/Behavioral: Negative for agitation, sleep disturbance and suicidal ideas.         Physical Exam  Vitals:    08/18/20 0916   BP: 120/70   BP Location: Left arm   Patient Position: Sitting   BP Method: Large (Manual)   Pulse: 65   Temp: 98.6 °F (37 °C)   TempSrc: Temporal   SpO2: 98%   Weight: 66.7 kg (147 lb 0.8 oz)   Height: 5' (1.524 m)    Body mass index is 28.72 kg/m².  Weight: 66.7 kg (147 lb 0.8 oz)   Height: 5' (152.4 cm)   Physical Exam  Vitals signs reviewed.   Constitutional:       Appearance: She is well-developed.   HENT:      Head: Normocephalic and atraumatic.      Right Ear: External ear normal.      Left Ear: External ear normal.      Nose: Nose normal.   Eyes:      Conjunctiva/sclera: Conjunctivae normal.      Pupils: Pupils are equal, round, and reactive to light.   Cardiovascular:      Rate and Rhythm: Normal rate and regular rhythm.      Heart sounds: Normal heart sounds.   Pulmonary:      Effort: Pulmonary effort is normal.      Breath sounds: Normal breath sounds.   Skin:     General: Skin is warm and  dry.   Neurological:      Mental Status: She is alert and oriented to person, place, and time.           Health Maintenance       Date Due Completion Date    Shingles Vaccine (1 of 2) 09/12/2002 ---    Influenza Vaccine (1) 09/01/2020 10/16/2019    DEXA SCAN 11/01/2021 11/1/2018    Mammogram 11/11/2021 11/11/2019    TETANUS VACCINE 09/06/2022 9/6/2012    Colorectal Cancer Screening 10/26/2022 10/26/2017    Lipid Panel 09/26/2024 9/26/2019              Patient note was created using [employer].  Any errors in syntax or even information may not have been identified and edited on initial review prior to signing this note.

## 2020-08-20 ENCOUNTER — LAB VISIT (OUTPATIENT)
Dept: LAB | Facility: HOSPITAL | Age: 70
End: 2020-08-20
Attending: FAMILY MEDICINE
Payer: MEDICARE

## 2020-08-20 ENCOUNTER — TELEPHONE (OUTPATIENT)
Dept: FAMILY MEDICINE | Facility: CLINIC | Age: 70
End: 2020-08-20

## 2020-08-20 DIAGNOSIS — E05.90 HYPERTHYROIDISM: ICD-10-CM

## 2020-08-20 DIAGNOSIS — Z00.00 ANNUAL PHYSICAL EXAM: ICD-10-CM

## 2020-08-20 DIAGNOSIS — E78.49 OTHER HYPERLIPIDEMIA: ICD-10-CM

## 2020-08-20 LAB
ALBUMIN SERPL BCP-MCNC: 4.2 G/DL (ref 3.5–5.2)
ALP SERPL-CCNC: 62 U/L (ref 55–135)
ALT SERPL W/O P-5'-P-CCNC: 22 U/L (ref 10–44)
ANION GAP SERPL CALC-SCNC: 8 MMOL/L (ref 8–16)
AST SERPL-CCNC: 24 U/L (ref 10–40)
BASOPHILS # BLD AUTO: 0.02 K/UL (ref 0–0.2)
BASOPHILS NFR BLD: 0.3 % (ref 0–1.9)
BILIRUB SERPL-MCNC: 0.3 MG/DL (ref 0.1–1)
BUN SERPL-MCNC: 11 MG/DL (ref 8–23)
CALCIUM SERPL-MCNC: 9.6 MG/DL (ref 8.7–10.5)
CHLORIDE SERPL-SCNC: 105 MMOL/L (ref 95–110)
CHOLEST SERPL-MCNC: 215 MG/DL (ref 120–199)
CHOLEST/HDLC SERPL: 3.1 {RATIO} (ref 2–5)
CO2 SERPL-SCNC: 26 MMOL/L (ref 23–29)
CREAT SERPL-MCNC: 0.8 MG/DL (ref 0.5–1.4)
DIFFERENTIAL METHOD: ABNORMAL
EOSINOPHIL # BLD AUTO: 0.1 K/UL (ref 0–0.5)
EOSINOPHIL NFR BLD: 1.2 % (ref 0–8)
ERYTHROCYTE [DISTWIDTH] IN BLOOD BY AUTOMATED COUNT: 12.6 % (ref 11.5–14.5)
EST. GFR  (AFRICAN AMERICAN): >60 ML/MIN/1.73 M^2
EST. GFR  (NON AFRICAN AMERICAN): >60 ML/MIN/1.73 M^2
ESTIMATED AVG GLUCOSE: 117 MG/DL (ref 68–131)
GLUCOSE SERPL-MCNC: 91 MG/DL (ref 70–110)
HBA1C MFR BLD HPLC: 5.7 % (ref 4–5.6)
HCT VFR BLD AUTO: 38.3 % (ref 37–48.5)
HDLC SERPL-MCNC: 69 MG/DL (ref 40–75)
HDLC SERPL: 32.1 % (ref 20–50)
HGB BLD-MCNC: 12.5 G/DL (ref 12–16)
IMM GRANULOCYTES # BLD AUTO: 0.02 K/UL (ref 0–0.04)
IMM GRANULOCYTES NFR BLD AUTO: 0.3 % (ref 0–0.5)
LDLC SERPL CALC-MCNC: 123.6 MG/DL (ref 63–159)
LYMPHOCYTES # BLD AUTO: 1.7 K/UL (ref 1–4.8)
LYMPHOCYTES NFR BLD: 24.8 % (ref 18–48)
MCH RBC QN AUTO: 31.9 PG (ref 27–31)
MCHC RBC AUTO-ENTMCNC: 32.6 G/DL (ref 32–36)
MCV RBC AUTO: 98 FL (ref 82–98)
MONOCYTES # BLD AUTO: 0.9 K/UL (ref 0.3–1)
MONOCYTES NFR BLD: 12.6 % (ref 4–15)
NEUTROPHILS # BLD AUTO: 4.2 K/UL (ref 1.8–7.7)
NEUTROPHILS NFR BLD: 60.8 % (ref 38–73)
NONHDLC SERPL-MCNC: 146 MG/DL
NRBC BLD-RTO: 0 /100 WBC
PLATELET # BLD AUTO: 334 K/UL (ref 150–350)
PMV BLD AUTO: 10.8 FL (ref 9.2–12.9)
POTASSIUM SERPL-SCNC: 5 MMOL/L (ref 3.5–5.1)
PROT SERPL-MCNC: 7.2 G/DL (ref 6–8.4)
RBC # BLD AUTO: 3.92 M/UL (ref 4–5.4)
SODIUM SERPL-SCNC: 139 MMOL/L (ref 136–145)
TRIGL SERPL-MCNC: 112 MG/DL (ref 30–150)
TSH SERPL DL<=0.005 MIU/L-ACNC: 1.81 UIU/ML (ref 0.4–4)
WBC # BLD AUTO: 6.85 K/UL (ref 3.9–12.7)

## 2020-08-20 PROCEDURE — 80061 LIPID PANEL: CPT

## 2020-08-20 PROCEDURE — 80053 COMPREHEN METABOLIC PANEL: CPT

## 2020-08-20 PROCEDURE — 83036 HEMOGLOBIN GLYCOSYLATED A1C: CPT

## 2020-08-20 PROCEDURE — 84443 ASSAY THYROID STIM HORMONE: CPT

## 2020-08-20 PROCEDURE — 85025 COMPLETE CBC W/AUTO DIFF WBC: CPT

## 2020-08-20 PROCEDURE — 36415 COLL VENOUS BLD VENIPUNCTURE: CPT | Mod: PO

## 2020-08-20 NOTE — TELEPHONE ENCOUNTER
FERNANDO                                                                                          Pt. Came to clinic to report a possible reaction to Shingrix first dose given on 8/18/2020. Shingrix first dose was given in Left Deltoid, injection site is red,with swelling and warm to touch. Informed pt. That this is normal with the Shingrix vaccine. VIS form was given to pt.  was notified and came to look at pts. Arm.  also informed pt. That this is a normal reaction to the vaccine, and she can apply cool compress to injection site and take Tylenol as needed.

## 2020-11-11 ENCOUNTER — TELEPHONE (OUTPATIENT)
Dept: FAMILY MEDICINE | Facility: CLINIC | Age: 70
End: 2020-11-11

## 2020-11-11 NOTE — TELEPHONE ENCOUNTER
----- Message from Sreekanth Pierre sent at 11/11/2020  9:35 AM CST -----  Type:  Mammogram    Caller is requesting to schedule their annual mammogram appointment.  Order is not listed in EPIC.  Please enter order and contact patient to schedule.    Name of Caller:  Pt     Where would they like the mammogram performed? [facility]    Would the patient rather a call back or a response via [facility]? Call back     Best Call Back Number:  [phone]     Additional Information:

## 2020-11-18 ENCOUNTER — CLINICAL SUPPORT (OUTPATIENT)
Dept: FAMILY MEDICINE | Facility: CLINIC | Age: 70
End: 2020-11-18
Payer: MEDICARE

## 2020-11-18 ENCOUNTER — TELEPHONE (OUTPATIENT)
Dept: FAMILY MEDICINE | Facility: CLINIC | Age: 70
End: 2020-11-18

## 2020-11-18 DIAGNOSIS — Z23 NEED FOR ZOSTER VACCINE: Primary | ICD-10-CM

## 2020-11-18 DIAGNOSIS — Z12.31 ENCOUNTER FOR SCREENING MAMMOGRAM FOR BREAST CANCER: Primary | ICD-10-CM

## 2020-11-18 PROCEDURE — 99499 UNLISTED E&M SERVICE: CPT | Mod: S$GLB,,, | Performed by: FAMILY MEDICINE

## 2020-11-18 PROCEDURE — 90750 ZOSTER RECOMBINANT VACCINE: ICD-10-PCS | Mod: S$GLB,,, | Performed by: FAMILY MEDICINE

## 2020-11-18 PROCEDURE — 99499 NO LOS: ICD-10-PCS | Mod: S$GLB,,, | Performed by: FAMILY MEDICINE

## 2020-11-18 PROCEDURE — 90750 HZV VACC RECOMBINANT IM: CPT | Mod: S$GLB,,, | Performed by: FAMILY MEDICINE

## 2020-11-18 PROCEDURE — 90471 IMMUNIZATION ADMIN: CPT | Mod: S$GLB,,, | Performed by: FAMILY MEDICINE

## 2020-11-18 PROCEDURE — 90471 ZOSTER RECOMBINANT VACCINE: ICD-10-PCS | Mod: S$GLB,,, | Performed by: FAMILY MEDICINE

## 2020-11-18 NOTE — TELEPHONE ENCOUNTER
----- Message from Priscila Meyer sent at 11/18/2020  2:20 PM CST -----  Type: Patient Call Back       What is the request in detail:  Pt calling to check status on mammo gram orders.      Can the clinic reply by [facility]? No       Would the patient rather a call back or a response via [facility]? Call back       Best call back number: [phone]        Thank you.

## 2020-11-18 NOTE — PROGRESS NOTES
Shingrix vaccine given, patient advised to wait 15 mins.for  Monitoring.. Patient verbalized an understanding.

## 2020-11-25 ENCOUNTER — HOSPITAL ENCOUNTER (OUTPATIENT)
Dept: RADIOLOGY | Facility: HOSPITAL | Age: 70
Discharge: HOME | End: 2020-11-25
Attending: FAMILY MEDICINE
Payer: MEDICARE

## 2020-11-25 DIAGNOSIS — Z12.31 ENCOUNTER FOR SCREENING MAMMOGRAM FOR BREAST CANCER: ICD-10-CM

## 2020-11-25 PROCEDURE — 77063 MAMMO DIGITAL SCREENING BILAT WITH TOMO: ICD-10-PCS | Mod: 26,,, | Performed by: RADIOLOGY

## 2020-11-25 PROCEDURE — 77063 BREAST TOMOSYNTHESIS BI: CPT | Mod: 26,,, | Performed by: RADIOLOGY

## 2020-11-25 PROCEDURE — 77067 SCR MAMMO BI INCL CAD: CPT | Mod: 26,,, | Performed by: RADIOLOGY

## 2020-11-25 PROCEDURE — 77067 SCR MAMMO BI INCL CAD: CPT | Mod: TC,PO

## 2020-11-25 PROCEDURE — 77067 MAMMO DIGITAL SCREENING BILAT WITH TOMO: ICD-10-PCS | Mod: 26,,, | Performed by: RADIOLOGY

## 2021-02-12 ENCOUNTER — OFFICE VISIT (OUTPATIENT)
Dept: FAMILY MEDICINE | Facility: CLINIC | Age: 71
End: 2021-02-12
Payer: MEDICARE

## 2021-02-12 VITALS
DIASTOLIC BLOOD PRESSURE: 70 MMHG | WEIGHT: 149.94 LBS | TEMPERATURE: 98 F | SYSTOLIC BLOOD PRESSURE: 136 MMHG | HEART RATE: 76 BPM | HEIGHT: 63 IN | OXYGEN SATURATION: 98 % | BODY MASS INDEX: 26.57 KG/M2

## 2021-02-12 DIAGNOSIS — Z82.49 FAMILY HISTORY OF HEART DISEASE: Primary | ICD-10-CM

## 2021-02-12 DIAGNOSIS — Z23 INFLUENZA VACCINE NEEDED: ICD-10-CM

## 2021-02-12 PROCEDURE — 1159F MED LIST DOCD IN RCRD: CPT | Mod: S$GLB,,, | Performed by: FAMILY MEDICINE

## 2021-02-12 PROCEDURE — 3008F BODY MASS INDEX DOCD: CPT | Mod: S$GLB,,, | Performed by: FAMILY MEDICINE

## 2021-02-12 PROCEDURE — 1101F PT FALLS ASSESS-DOCD LE1/YR: CPT | Mod: S$GLB,,, | Performed by: FAMILY MEDICINE

## 2021-02-12 PROCEDURE — G0008 ADMIN INFLUENZA VIRUS VAC: HCPCS | Mod: S$GLB,,, | Performed by: FAMILY MEDICINE

## 2021-02-12 PROCEDURE — 99213 OFFICE O/P EST LOW 20 MIN: CPT | Mod: 25,S$GLB,, | Performed by: FAMILY MEDICINE

## 2021-02-12 PROCEDURE — 1126F PR PAIN SEVERITY QUANTIFIED, NO PAIN PRESENT: ICD-10-PCS | Mod: S$GLB,,, | Performed by: FAMILY MEDICINE

## 2021-02-12 PROCEDURE — 1101F PR PT FALLS ASSESS DOC 0-1 FALLS W/OUT INJ PAST YR: ICD-10-PCS | Mod: S$GLB,,, | Performed by: FAMILY MEDICINE

## 2021-02-12 PROCEDURE — 99999 PR PBB SHADOW E&M-EST. PATIENT-LVL V: CPT | Mod: PBBFAC,,, | Performed by: FAMILY MEDICINE

## 2021-02-12 PROCEDURE — 99213 PR OFFICE/OUTPT VISIT, EST, LEVL III, 20-29 MIN: ICD-10-PCS | Mod: 25,S$GLB,, | Performed by: FAMILY MEDICINE

## 2021-02-12 PROCEDURE — G0008 FLU VACCINE - QUADRIVALENT - ADJUVANTED: ICD-10-PCS | Mod: S$GLB,,, | Performed by: FAMILY MEDICINE

## 2021-02-12 PROCEDURE — 90694 FLU VACCINE - QUADRIVALENT - ADJUVANTED: ICD-10-PCS | Mod: S$GLB,,, | Performed by: FAMILY MEDICINE

## 2021-02-12 PROCEDURE — 90694 VACC AIIV4 NO PRSRV 0.5ML IM: CPT | Mod: S$GLB,,, | Performed by: FAMILY MEDICINE

## 2021-02-12 PROCEDURE — 99999 PR PBB SHADOW E&M-EST. PATIENT-LVL V: ICD-10-PCS | Mod: PBBFAC,,, | Performed by: FAMILY MEDICINE

## 2021-02-12 PROCEDURE — 1126F AMNT PAIN NOTED NONE PRSNT: CPT | Mod: S$GLB,,, | Performed by: FAMILY MEDICINE

## 2021-02-12 PROCEDURE — 3008F PR BODY MASS INDEX (BMI) DOCUMENTED: ICD-10-PCS | Mod: S$GLB,,, | Performed by: FAMILY MEDICINE

## 2021-02-12 PROCEDURE — 1159F PR MEDICATION LIST DOCUMENTED IN MEDICAL RECORD: ICD-10-PCS | Mod: S$GLB,,, | Performed by: FAMILY MEDICINE

## 2021-02-12 PROCEDURE — 3288F PR FALLS RISK ASSESSMENT DOCUMENTED: ICD-10-PCS | Mod: S$GLB,,, | Performed by: FAMILY MEDICINE

## 2021-02-12 PROCEDURE — 3288F FALL RISK ASSESSMENT DOCD: CPT | Mod: S$GLB,,, | Performed by: FAMILY MEDICINE

## 2021-02-12 RX ORDER — AMOXICILLIN 500 MG
CAPSULE ORAL DAILY
COMMUNITY

## 2021-02-26 ENCOUNTER — OFFICE VISIT (OUTPATIENT)
Dept: CARDIOLOGY | Facility: CLINIC | Age: 71
End: 2021-02-26
Payer: MEDICARE

## 2021-02-26 VITALS — HEART RATE: 70 BPM | HEIGHT: 63 IN | WEIGHT: 146.63 LBS | BODY MASS INDEX: 25.98 KG/M2

## 2021-02-26 DIAGNOSIS — Z82.49 FAMILY HISTORY OF HEART DISEASE: ICD-10-CM

## 2021-02-26 DIAGNOSIS — E78.49 OTHER HYPERLIPIDEMIA: ICD-10-CM

## 2021-02-26 DIAGNOSIS — K21.00 GASTROESOPHAGEAL REFLUX DISEASE WITH ESOPHAGITIS, UNSPECIFIED WHETHER HEMORRHAGE: ICD-10-CM

## 2021-02-26 DIAGNOSIS — R00.2 PALPITATIONS: ICD-10-CM

## 2021-02-26 DIAGNOSIS — E05.90 HYPERTHYROIDISM: ICD-10-CM

## 2021-02-26 DIAGNOSIS — R06.02 SOB (SHORTNESS OF BREATH): Primary | ICD-10-CM

## 2021-02-26 PROCEDURE — 1126F PR PAIN SEVERITY QUANTIFIED, NO PAIN PRESENT: ICD-10-PCS | Mod: S$GLB,,, | Performed by: INTERNAL MEDICINE

## 2021-02-26 PROCEDURE — 99204 PR OFFICE/OUTPT VISIT, NEW, LEVL IV, 45-59 MIN: ICD-10-PCS | Mod: S$GLB,,, | Performed by: INTERNAL MEDICINE

## 2021-02-26 PROCEDURE — 99999 PR PBB SHADOW E&M-EST. PATIENT-LVL IV: CPT | Mod: PBBFAC,,, | Performed by: INTERNAL MEDICINE

## 2021-02-26 PROCEDURE — 1159F MED LIST DOCD IN RCRD: CPT | Mod: S$GLB,,, | Performed by: INTERNAL MEDICINE

## 2021-02-26 PROCEDURE — 1101F PR PT FALLS ASSESS DOC 0-1 FALLS W/OUT INJ PAST YR: ICD-10-PCS | Mod: S$GLB,,, | Performed by: INTERNAL MEDICINE

## 2021-02-26 PROCEDURE — 99204 OFFICE O/P NEW MOD 45 MIN: CPT | Mod: S$GLB,,, | Performed by: INTERNAL MEDICINE

## 2021-02-26 PROCEDURE — 99999 PR PBB SHADOW E&M-EST. PATIENT-LVL IV: ICD-10-PCS | Mod: PBBFAC,,, | Performed by: INTERNAL MEDICINE

## 2021-02-26 PROCEDURE — 93000 EKG 12-LEAD: ICD-10-PCS | Mod: S$GLB,,, | Performed by: INTERNAL MEDICINE

## 2021-02-26 PROCEDURE — 3008F BODY MASS INDEX DOCD: CPT | Mod: S$GLB,,, | Performed by: INTERNAL MEDICINE

## 2021-02-26 PROCEDURE — 1126F AMNT PAIN NOTED NONE PRSNT: CPT | Mod: S$GLB,,, | Performed by: INTERNAL MEDICINE

## 2021-02-26 PROCEDURE — 93000 ELECTROCARDIOGRAM COMPLETE: CPT | Mod: S$GLB,,, | Performed by: INTERNAL MEDICINE

## 2021-02-26 PROCEDURE — 3288F PR FALLS RISK ASSESSMENT DOCUMENTED: ICD-10-PCS | Mod: S$GLB,,, | Performed by: INTERNAL MEDICINE

## 2021-02-26 PROCEDURE — 3008F PR BODY MASS INDEX (BMI) DOCUMENTED: ICD-10-PCS | Mod: S$GLB,,, | Performed by: INTERNAL MEDICINE

## 2021-02-26 PROCEDURE — 3288F FALL RISK ASSESSMENT DOCD: CPT | Mod: S$GLB,,, | Performed by: INTERNAL MEDICINE

## 2021-02-26 PROCEDURE — 1159F PR MEDICATION LIST DOCUMENTED IN MEDICAL RECORD: ICD-10-PCS | Mod: S$GLB,,, | Performed by: INTERNAL MEDICINE

## 2021-02-26 PROCEDURE — 1101F PT FALLS ASSESS-DOCD LE1/YR: CPT | Mod: S$GLB,,, | Performed by: INTERNAL MEDICINE

## 2021-02-26 RX ORDER — PANTOPRAZOLE SODIUM 20 MG/1
20 TABLET, DELAYED RELEASE ORAL DAILY
COMMUNITY
Start: 2021-02-14 | End: 2023-10-10 | Stop reason: SDUPTHER

## 2021-03-05 ENCOUNTER — HOSPITAL ENCOUNTER (OUTPATIENT)
Dept: CARDIOLOGY | Facility: HOSPITAL | Age: 71
Discharge: HOME | End: 2021-03-05
Attending: INTERNAL MEDICINE
Payer: MEDICARE

## 2021-03-05 ENCOUNTER — HOSPITAL ENCOUNTER (OUTPATIENT)
Dept: RADIOLOGY | Facility: HOSPITAL | Age: 71
Discharge: HOME | End: 2021-03-05
Attending: INTERNAL MEDICINE
Payer: MEDICARE

## 2021-03-05 VITALS — HEIGHT: 63 IN | BODY MASS INDEX: 25.97 KG/M2

## 2021-03-05 DIAGNOSIS — R06.02 SOB (SHORTNESS OF BREATH): ICD-10-CM

## 2021-03-05 DIAGNOSIS — R00.2 PALPITATIONS: ICD-10-CM

## 2021-03-05 PROCEDURE — 93017 CV STRESS TEST TRACING ONLY: CPT

## 2021-03-05 PROCEDURE — 93016 CV STRESS TEST SUPVJ ONLY: CPT | Mod: ,,, | Performed by: INTERNAL MEDICINE

## 2021-03-05 PROCEDURE — 78452 HT MUSCLE IMAGE SPECT MULT: CPT | Mod: 26,,, | Performed by: INTERNAL MEDICINE

## 2021-03-05 PROCEDURE — 93306 TTE W/DOPPLER COMPLETE: CPT

## 2021-03-05 PROCEDURE — 93016 STRESS TEST WITH MYOCARDIAL PERFUSION (CUPID ONLY): ICD-10-PCS | Mod: ,,, | Performed by: INTERNAL MEDICINE

## 2021-03-05 PROCEDURE — 93306 TTE W/DOPPLER COMPLETE: CPT | Mod: 26,,, | Performed by: INTERNAL MEDICINE

## 2021-03-05 PROCEDURE — 93306 ECHO (CUPID ONLY): ICD-10-PCS | Mod: 26,,, | Performed by: INTERNAL MEDICINE

## 2021-03-05 PROCEDURE — A9502 TC99M TETROFOSMIN: HCPCS

## 2021-03-05 PROCEDURE — 78452 STRESS TEST WITH MYOCARDIAL PERFUSION (CUPID ONLY): ICD-10-PCS | Mod: 26,,, | Performed by: INTERNAL MEDICINE

## 2021-03-05 PROCEDURE — 93018 CV STRESS TEST I&R ONLY: CPT | Mod: ,,, | Performed by: INTERNAL MEDICINE

## 2021-03-05 PROCEDURE — 78452 HT MUSCLE IMAGE SPECT MULT: CPT

## 2021-03-05 PROCEDURE — 93018 STRESS TEST WITH MYOCARDIAL PERFUSION (CUPID ONLY): ICD-10-PCS | Mod: ,,, | Performed by: INTERNAL MEDICINE

## 2021-03-06 LAB
AORTIC ROOT ANNULUS: 3.13 CM
AORTIC VALVE CUSP SEPERATION: 2.04 CM
ASCENDING AORTA: 3.7 CM
AV INDEX (PROSTH): 0.99
AV MEAN GRADIENT: 2 MMHG
AV PEAK GRADIENT: 4 MMHG
AV VALVE AREA: 3.02 CM2
AV VELOCITY RATIO: 0.95
CV ECHO LV RWT: 0.63 CM
DOP CALC AO PEAK VEL: 1.02 M/S
DOP CALC AO VTI: 25.17 CM
DOP CALC LVOT AREA: 3 CM2
DOP CALC LVOT DIAMETER: 1.97 CM
DOP CALC LVOT PEAK VEL: 0.97 M/S
DOP CALC LVOT STROKE VOLUME: 75.92 CM3
DOP CALCLVOT PEAK VEL VTI: 24.92 CM
E WAVE DECELERATION TIME: 257.1 MSEC
E/A RATIO: 0.77
E/E' RATIO: 13.09 M/S
ECHO LV POSTERIOR WALL: 1.18 CM (ref 0.6–1.1)
FRACTIONAL SHORTENING: 36 % (ref 28–44)
INTERVENTRICULAR SEPTUM: 1.21 CM (ref 0.6–1.1)
IVRT: 137.01 MSEC
LA MAJOR: 4.67 CM
LA MINOR: 4.46 CM
LA WIDTH: 3.54 CM
LEFT ATRIUM SIZE: 3.8 CM
LEFT ATRIUM VOLUME: 52.17 CM3
LEFT INTERNAL DIMENSION IN SYSTOLE: 2.41 CM (ref 2.1–4)
LEFT VENTRICLE DIASTOLIC VOLUME: 59.56 ML
LEFT VENTRICLE SYSTOLIC VOLUME: 20.41 ML
LEFT VENTRICULAR INTERNAL DIMENSION IN DIASTOLE: 3.74 CM (ref 3.5–6)
LEFT VENTRICULAR MASS: 148.72 G
LV LATERAL E/E' RATIO: 10.29 M/S
LV SEPTAL E/E' RATIO: 18 M/S
MV PEAK A VEL: 0.93 M/S
MV PEAK E VEL: 0.72 M/S
MV STENOSIS PRESSURE HALF TIME: 74.56 MS
MV VALVE AREA P 1/2 METHOD: 2.95 CM2
PISA TR MAX VEL: 2.16 M/S
PULM VEIN S/D RATIO: 1.29
PV PEAK D VEL: 0.38 M/S
PV PEAK S VEL: 0.49 M/S
PV PEAK VELOCITY: 0.92 CM/S
RA MAJOR: 4.66 CM
RA PRESSURE: 3 MMHG
RA WIDTH: 3.05 CM
RIGHT VENTRICULAR END-DIASTOLIC DIMENSION: 3.03 CM
RV TISSUE DOPPLER FREE WALL SYSTOLIC VELOCITY 1 (APICAL 4 CHAMBER VIEW): 12.24 CM/S
SINUS: 3.3 CM
STJ: 3.09 CM
TDI LATERAL: 0.07 M/S
TDI SEPTAL: 0.04 M/S
TDI: 0.06 M/S
TR MAX PG: 19 MMHG
TRICUSPID ANNULAR PLANE SYSTOLIC EXCURSION: 1.9 CM
TV REST PULMONARY ARTERY PRESSURE: 22 MMHG

## 2021-03-07 LAB
CV STRESS BASE HR: 63 BPM
DIASTOLIC BLOOD PRESSURE: 69 MMHG
NUC REST EJECTION FRACTION: 70
OHS CV CPX 1 MINUTE RECOVERY HEART RATE: 113 BPM
OHS CV CPX 85 PERCENT MAX PREDICTED HEART RATE MALE: 124
OHS CV CPX MAX PREDICTED HEART RATE: 146
OHS CV CPX PATIENT IS FEMALE: 1
OHS CV CPX PATIENT IS MALE: 0
OHS CV CPX PEAK DIASTOLIC BLOOD PRESSURE: 113 MMHG
OHS CV CPX PEAK HEAR RATE: 141 BPM
OHS CV CPX PEAK RATE PRESSURE PRODUCT: NORMAL
OHS CV CPX PEAK SYSTOLIC BLOOD PRESSURE: 205 MMHG
OHS CV CPX PERCENT MAX PREDICTED HEART RATE ACHIEVED: 96
OHS CV CPX RATE PRESSURE PRODUCT PRESENTING: 8946
STRESS ECHO POST EXERCISE DUR MIN: 5 MINUTES
STRESS ECHO POST EXERCISE DUR SEC: 40 SECONDS
SYSTOLIC BLOOD PRESSURE: 142 MMHG

## 2021-03-31 ENCOUNTER — OFFICE VISIT (OUTPATIENT)
Dept: CARDIOLOGY | Facility: CLINIC | Age: 71
End: 2021-03-31
Payer: MEDICARE

## 2021-03-31 VITALS
BODY MASS INDEX: 25.75 KG/M2 | HEART RATE: 68 BPM | DIASTOLIC BLOOD PRESSURE: 76 MMHG | OXYGEN SATURATION: 98 % | SYSTOLIC BLOOD PRESSURE: 124 MMHG | HEIGHT: 63 IN | WEIGHT: 145.31 LBS

## 2021-03-31 DIAGNOSIS — Z82.49 FAMILY HISTORY OF HEART DISEASE: ICD-10-CM

## 2021-03-31 DIAGNOSIS — I51.7 LEFT VENTRICULAR HYPERTROPHY: ICD-10-CM

## 2021-03-31 DIAGNOSIS — I35.1 NONRHEUMATIC AORTIC VALVE INSUFFICIENCY: ICD-10-CM

## 2021-03-31 DIAGNOSIS — E78.49 OTHER HYPERLIPIDEMIA: ICD-10-CM

## 2021-03-31 DIAGNOSIS — R00.2 PALPITATIONS: ICD-10-CM

## 2021-03-31 DIAGNOSIS — I51.89 GRADE I DIASTOLIC DYSFUNCTION: ICD-10-CM

## 2021-03-31 DIAGNOSIS — I36.1 NONRHEUMATIC TRICUSPID VALVE REGURGITATION: ICD-10-CM

## 2021-03-31 DIAGNOSIS — R06.02 SOB (SHORTNESS OF BREATH): Primary | ICD-10-CM

## 2021-03-31 DIAGNOSIS — E05.90 HYPERTHYROIDISM: ICD-10-CM

## 2021-03-31 PROCEDURE — 99214 PR OFFICE/OUTPT VISIT, EST, LEVL IV, 30-39 MIN: ICD-10-PCS | Mod: S$GLB,,, | Performed by: INTERNAL MEDICINE

## 2021-03-31 PROCEDURE — 1159F MED LIST DOCD IN RCRD: CPT | Mod: S$GLB,,, | Performed by: INTERNAL MEDICINE

## 2021-03-31 PROCEDURE — 3008F BODY MASS INDEX DOCD: CPT | Mod: S$GLB,,, | Performed by: INTERNAL MEDICINE

## 2021-03-31 PROCEDURE — 99999 PR PBB SHADOW E&M-EST. PATIENT-LVL III: ICD-10-PCS | Mod: PBBFAC,,, | Performed by: INTERNAL MEDICINE

## 2021-03-31 PROCEDURE — 3288F PR FALLS RISK ASSESSMENT DOCUMENTED: ICD-10-PCS | Mod: S$GLB,,, | Performed by: INTERNAL MEDICINE

## 2021-03-31 PROCEDURE — 1101F PT FALLS ASSESS-DOCD LE1/YR: CPT | Mod: S$GLB,,, | Performed by: INTERNAL MEDICINE

## 2021-03-31 PROCEDURE — 3008F PR BODY MASS INDEX (BMI) DOCUMENTED: ICD-10-PCS | Mod: S$GLB,,, | Performed by: INTERNAL MEDICINE

## 2021-03-31 PROCEDURE — 1126F PR PAIN SEVERITY QUANTIFIED, NO PAIN PRESENT: ICD-10-PCS | Mod: S$GLB,,, | Performed by: INTERNAL MEDICINE

## 2021-03-31 PROCEDURE — 1101F PR PT FALLS ASSESS DOC 0-1 FALLS W/OUT INJ PAST YR: ICD-10-PCS | Mod: S$GLB,,, | Performed by: INTERNAL MEDICINE

## 2021-03-31 PROCEDURE — 1159F PR MEDICATION LIST DOCUMENTED IN MEDICAL RECORD: ICD-10-PCS | Mod: S$GLB,,, | Performed by: INTERNAL MEDICINE

## 2021-03-31 PROCEDURE — 1126F AMNT PAIN NOTED NONE PRSNT: CPT | Mod: S$GLB,,, | Performed by: INTERNAL MEDICINE

## 2021-03-31 PROCEDURE — 3288F FALL RISK ASSESSMENT DOCD: CPT | Mod: S$GLB,,, | Performed by: INTERNAL MEDICINE

## 2021-03-31 PROCEDURE — 99999 PR PBB SHADOW E&M-EST. PATIENT-LVL III: CPT | Mod: PBBFAC,,, | Performed by: INTERNAL MEDICINE

## 2021-03-31 PROCEDURE — 99214 OFFICE O/P EST MOD 30 MIN: CPT | Mod: S$GLB,,, | Performed by: INTERNAL MEDICINE

## 2021-05-12 ENCOUNTER — OFFICE VISIT (OUTPATIENT)
Dept: FAMILY MEDICINE | Facility: CLINIC | Age: 71
End: 2021-05-12
Payer: MEDICARE

## 2021-05-12 VITALS
WEIGHT: 146.38 LBS | DIASTOLIC BLOOD PRESSURE: 70 MMHG | TEMPERATURE: 98 F | HEART RATE: 67 BPM | SYSTOLIC BLOOD PRESSURE: 110 MMHG | OXYGEN SATURATION: 99 % | BODY MASS INDEX: 25.93 KG/M2

## 2021-05-12 DIAGNOSIS — Z00.00 ANNUAL PHYSICAL EXAM: Primary | ICD-10-CM

## 2021-05-12 DIAGNOSIS — E05.90 HYPERTHYROIDISM: ICD-10-CM

## 2021-05-12 DIAGNOSIS — R73.03 PREDIABETES: ICD-10-CM

## 2021-05-12 DIAGNOSIS — E78.49 OTHER HYPERLIPIDEMIA: ICD-10-CM

## 2021-05-12 PROCEDURE — 1101F PT FALLS ASSESS-DOCD LE1/YR: CPT | Mod: S$GLB,,, | Performed by: FAMILY MEDICINE

## 2021-05-12 PROCEDURE — 99999 PR PBB SHADOW E&M-EST. PATIENT-LVL III: ICD-10-PCS | Mod: PBBFAC,,, | Performed by: FAMILY MEDICINE

## 2021-05-12 PROCEDURE — 1126F PR PAIN SEVERITY QUANTIFIED, NO PAIN PRESENT: ICD-10-PCS | Mod: S$GLB,,, | Performed by: FAMILY MEDICINE

## 2021-05-12 PROCEDURE — 3008F PR BODY MASS INDEX (BMI) DOCUMENTED: ICD-10-PCS | Mod: S$GLB,,, | Performed by: FAMILY MEDICINE

## 2021-05-12 PROCEDURE — 3288F PR FALLS RISK ASSESSMENT DOCUMENTED: ICD-10-PCS | Mod: S$GLB,,, | Performed by: FAMILY MEDICINE

## 2021-05-12 PROCEDURE — 99213 PR OFFICE/OUTPT VISIT, EST, LEVL III, 20-29 MIN: ICD-10-PCS | Mod: S$GLB,,, | Performed by: FAMILY MEDICINE

## 2021-05-12 PROCEDURE — 99213 OFFICE O/P EST LOW 20 MIN: CPT | Mod: S$GLB,,, | Performed by: FAMILY MEDICINE

## 2021-05-12 PROCEDURE — 1101F PR PT FALLS ASSESS DOC 0-1 FALLS W/OUT INJ PAST YR: ICD-10-PCS | Mod: S$GLB,,, | Performed by: FAMILY MEDICINE

## 2021-05-12 PROCEDURE — 3008F BODY MASS INDEX DOCD: CPT | Mod: S$GLB,,, | Performed by: FAMILY MEDICINE

## 2021-05-12 PROCEDURE — 1126F AMNT PAIN NOTED NONE PRSNT: CPT | Mod: S$GLB,,, | Performed by: FAMILY MEDICINE

## 2021-05-12 PROCEDURE — 3288F FALL RISK ASSESSMENT DOCD: CPT | Mod: S$GLB,,, | Performed by: FAMILY MEDICINE

## 2021-05-12 PROCEDURE — 99999 PR PBB SHADOW E&M-EST. PATIENT-LVL III: CPT | Mod: PBBFAC,,, | Performed by: FAMILY MEDICINE

## 2021-05-20 ENCOUNTER — LAB VISIT (OUTPATIENT)
Dept: LAB | Facility: HOSPITAL | Age: 71
End: 2021-05-20
Attending: FAMILY MEDICINE
Payer: MEDICARE

## 2021-05-20 DIAGNOSIS — E05.90 HYPERTHYROIDISM: ICD-10-CM

## 2021-05-20 DIAGNOSIS — E78.49 OTHER HYPERLIPIDEMIA: ICD-10-CM

## 2021-05-20 DIAGNOSIS — R73.03 PREDIABETES: ICD-10-CM

## 2021-05-20 DIAGNOSIS — Z00.00 ANNUAL PHYSICAL EXAM: ICD-10-CM

## 2021-05-20 LAB
ALBUMIN SERPL BCP-MCNC: 4.3 G/DL (ref 3.5–5.2)
ALP SERPL-CCNC: 66 U/L (ref 55–135)
ALT SERPL W/O P-5'-P-CCNC: 23 U/L (ref 10–44)
ANION GAP SERPL CALC-SCNC: 7 MMOL/L (ref 8–16)
AST SERPL-CCNC: 26 U/L (ref 10–40)
BASOPHILS # BLD AUTO: 0.03 K/UL (ref 0–0.2)
BASOPHILS NFR BLD: 0.7 % (ref 0–1.9)
BILIRUB SERPL-MCNC: 0.4 MG/DL (ref 0.1–1)
BUN SERPL-MCNC: 11 MG/DL (ref 8–23)
CALCIUM SERPL-MCNC: 10.5 MG/DL (ref 8.7–10.5)
CHLORIDE SERPL-SCNC: 107 MMOL/L (ref 95–110)
CHOLEST SERPL-MCNC: 228 MG/DL (ref 120–199)
CHOLEST/HDLC SERPL: 3.9 {RATIO} (ref 2–5)
CO2 SERPL-SCNC: 28 MMOL/L (ref 23–29)
CREAT SERPL-MCNC: 0.8 MG/DL (ref 0.5–1.4)
DIFFERENTIAL METHOD: ABNORMAL
EOSINOPHIL # BLD AUTO: 0.1 K/UL (ref 0–0.5)
EOSINOPHIL NFR BLD: 1.5 % (ref 0–8)
ERYTHROCYTE [DISTWIDTH] IN BLOOD BY AUTOMATED COUNT: 12.6 % (ref 11.5–14.5)
EST. GFR  (AFRICAN AMERICAN): >60 ML/MIN/1.73 M^2
EST. GFR  (NON AFRICAN AMERICAN): >60 ML/MIN/1.73 M^2
ESTIMATED AVG GLUCOSE: 111 MG/DL (ref 68–131)
GLUCOSE SERPL-MCNC: 93 MG/DL (ref 70–110)
HBA1C MFR BLD: 5.5 % (ref 4–5.6)
HCT VFR BLD AUTO: 40.3 % (ref 37–48.5)
HDLC SERPL-MCNC: 58 MG/DL (ref 40–75)
HDLC SERPL: 25.4 % (ref 20–50)
HGB BLD-MCNC: 13.3 G/DL (ref 12–16)
IMM GRANULOCYTES # BLD AUTO: 0.02 K/UL (ref 0–0.04)
IMM GRANULOCYTES NFR BLD AUTO: 0.4 % (ref 0–0.5)
LDLC SERPL CALC-MCNC: 146 MG/DL (ref 63–159)
LYMPHOCYTES # BLD AUTO: 2.3 K/UL (ref 1–4.8)
LYMPHOCYTES NFR BLD: 49.8 % (ref 18–48)
MCH RBC QN AUTO: 31.3 PG (ref 27–31)
MCHC RBC AUTO-ENTMCNC: 33 G/DL (ref 32–36)
MCV RBC AUTO: 95 FL (ref 82–98)
MONOCYTES # BLD AUTO: 0.5 K/UL (ref 0.3–1)
MONOCYTES NFR BLD: 11.6 % (ref 4–15)
NEUTROPHILS # BLD AUTO: 1.6 K/UL (ref 1.8–7.7)
NEUTROPHILS NFR BLD: 36 % (ref 38–73)
NONHDLC SERPL-MCNC: 170 MG/DL
NRBC BLD-RTO: 0 /100 WBC
PLATELET # BLD AUTO: 391 K/UL (ref 150–450)
PMV BLD AUTO: 10.3 FL (ref 9.2–12.9)
POTASSIUM SERPL-SCNC: 5.3 MMOL/L (ref 3.5–5.1)
PROT SERPL-MCNC: 7.1 G/DL (ref 6–8.4)
RBC # BLD AUTO: 4.25 M/UL (ref 4–5.4)
SODIUM SERPL-SCNC: 142 MMOL/L (ref 136–145)
TRIGL SERPL-MCNC: 120 MG/DL (ref 30–150)
TSH SERPL DL<=0.005 MIU/L-ACNC: 3.03 UIU/ML (ref 0.4–4)
WBC # BLD AUTO: 4.56 K/UL (ref 3.9–12.7)

## 2021-05-20 PROCEDURE — 36415 COLL VENOUS BLD VENIPUNCTURE: CPT | Mod: PO | Performed by: FAMILY MEDICINE

## 2021-05-20 PROCEDURE — 84443 ASSAY THYROID STIM HORMONE: CPT | Performed by: FAMILY MEDICINE

## 2021-05-20 PROCEDURE — 80061 LIPID PANEL: CPT | Performed by: FAMILY MEDICINE

## 2021-05-20 PROCEDURE — 80053 COMPREHEN METABOLIC PANEL: CPT | Performed by: FAMILY MEDICINE

## 2021-05-20 PROCEDURE — 83036 HEMOGLOBIN GLYCOSYLATED A1C: CPT | Performed by: FAMILY MEDICINE

## 2021-05-20 PROCEDURE — 85025 COMPLETE CBC W/AUTO DIFF WBC: CPT | Performed by: FAMILY MEDICINE

## 2021-07-01 ENCOUNTER — PATIENT MESSAGE (OUTPATIENT)
Dept: ADMINISTRATIVE | Facility: OTHER | Age: 71
End: 2021-07-01

## 2021-07-28 ENCOUNTER — LAB VISIT (OUTPATIENT)
Dept: FAMILY MEDICINE | Facility: CLINIC | Age: 71
End: 2021-07-28
Payer: MEDICARE

## 2021-07-28 ENCOUNTER — OFFICE VISIT (OUTPATIENT)
Dept: FAMILY MEDICINE | Facility: CLINIC | Age: 71
End: 2021-07-28
Payer: MEDICARE

## 2021-07-28 VITALS
HEART RATE: 74 BPM | HEIGHT: 62 IN | SYSTOLIC BLOOD PRESSURE: 138 MMHG | WEIGHT: 145.75 LBS | OXYGEN SATURATION: 96 % | DIASTOLIC BLOOD PRESSURE: 74 MMHG | BODY MASS INDEX: 26.82 KG/M2 | TEMPERATURE: 98 F

## 2021-07-28 DIAGNOSIS — J01.40 ACUTE NON-RECURRENT PANSINUSITIS: ICD-10-CM

## 2021-07-28 DIAGNOSIS — J01.40 ACUTE NON-RECURRENT PANSINUSITIS: Primary | ICD-10-CM

## 2021-07-28 PROCEDURE — 1101F PT FALLS ASSESS-DOCD LE1/YR: CPT | Mod: S$GLB,,, | Performed by: FAMILY MEDICINE

## 2021-07-28 PROCEDURE — 1159F MED LIST DOCD IN RCRD: CPT | Mod: S$GLB,,, | Performed by: FAMILY MEDICINE

## 2021-07-28 PROCEDURE — 3008F PR BODY MASS INDEX (BMI) DOCUMENTED: ICD-10-PCS | Mod: S$GLB,,, | Performed by: FAMILY MEDICINE

## 2021-07-28 PROCEDURE — 1101F PR PT FALLS ASSESS DOC 0-1 FALLS W/OUT INJ PAST YR: ICD-10-PCS | Mod: S$GLB,,, | Performed by: FAMILY MEDICINE

## 2021-07-28 PROCEDURE — 99999 PR PBB SHADOW E&M-EST. PATIENT-LVL III: CPT | Mod: PBBFAC,,, | Performed by: FAMILY MEDICINE

## 2021-07-28 PROCEDURE — 99213 OFFICE O/P EST LOW 20 MIN: CPT | Mod: 25,S$GLB,, | Performed by: FAMILY MEDICINE

## 2021-07-28 PROCEDURE — 1159F PR MEDICATION LIST DOCUMENTED IN MEDICAL RECORD: ICD-10-PCS | Mod: S$GLB,,, | Performed by: FAMILY MEDICINE

## 2021-07-28 PROCEDURE — 1160F PR REVIEW ALL MEDS BY PRESCRIBER/CLIN PHARMACIST DOCUMENTED: ICD-10-PCS | Mod: S$GLB,,, | Performed by: FAMILY MEDICINE

## 2021-07-28 PROCEDURE — U0003 INFECTIOUS AGENT DETECTION BY NUCLEIC ACID (DNA OR RNA); SEVERE ACUTE RESPIRATORY SYNDROME CORONAVIRUS 2 (SARS-COV-2) (CORONAVIRUS DISEASE [COVID-19]), AMPLIFIED PROBE TECHNIQUE, MAKING USE OF HIGH THROUGHPUT TECHNOLOGIES AS DESCRIBED BY CMS-2020-01-R: HCPCS | Performed by: FAMILY MEDICINE

## 2021-07-28 PROCEDURE — 99999 PR PBB SHADOW E&M-EST. PATIENT-LVL III: ICD-10-PCS | Mod: PBBFAC,,, | Performed by: FAMILY MEDICINE

## 2021-07-28 PROCEDURE — 1126F AMNT PAIN NOTED NONE PRSNT: CPT | Mod: S$GLB,,, | Performed by: FAMILY MEDICINE

## 2021-07-28 PROCEDURE — 3008F BODY MASS INDEX DOCD: CPT | Mod: S$GLB,,, | Performed by: FAMILY MEDICINE

## 2021-07-28 PROCEDURE — 96372 THER/PROPH/DIAG INJ SC/IM: CPT | Mod: S$GLB,,, | Performed by: FAMILY MEDICINE

## 2021-07-28 PROCEDURE — 96372 PR INJECTION,THERAP/PROPH/DIAG2ST, IM OR SUBCUT: ICD-10-PCS | Mod: S$GLB,,, | Performed by: FAMILY MEDICINE

## 2021-07-28 PROCEDURE — 1126F PR PAIN SEVERITY QUANTIFIED, NO PAIN PRESENT: ICD-10-PCS | Mod: S$GLB,,, | Performed by: FAMILY MEDICINE

## 2021-07-28 PROCEDURE — 99213 PR OFFICE/OUTPT VISIT, EST, LEVL III, 20-29 MIN: ICD-10-PCS | Mod: 25,S$GLB,, | Performed by: FAMILY MEDICINE

## 2021-07-28 PROCEDURE — 3288F PR FALLS RISK ASSESSMENT DOCUMENTED: ICD-10-PCS | Mod: S$GLB,,, | Performed by: FAMILY MEDICINE

## 2021-07-28 PROCEDURE — 3288F FALL RISK ASSESSMENT DOCD: CPT | Mod: S$GLB,,, | Performed by: FAMILY MEDICINE

## 2021-07-28 PROCEDURE — U0005 INFEC AGEN DETEC AMPLI PROBE: HCPCS | Performed by: FAMILY MEDICINE

## 2021-07-28 PROCEDURE — 1160F RVW MEDS BY RX/DR IN RCRD: CPT | Mod: S$GLB,,, | Performed by: FAMILY MEDICINE

## 2021-07-28 RX ORDER — METHYLPREDNISOLONE SOD SUCC 125 MG
125 VIAL (EA) INJECTION
Status: COMPLETED | OUTPATIENT
Start: 2021-07-28 | End: 2021-07-28

## 2021-07-28 RX ORDER — AMOXICILLIN AND CLAVULANATE POTASSIUM 875; 125 MG/1; MG/1
1 TABLET, FILM COATED ORAL 2 TIMES DAILY
Qty: 14 TABLET | Refills: 0 | Status: SHIPPED | OUTPATIENT
Start: 2021-07-28 | End: 2021-08-04

## 2021-07-28 RX ADMIN — Medication 125 MG: at 10:07

## 2021-07-29 LAB
SARS-COV-2 RNA RESP QL NAA+PROBE: NOT DETECTED
SARS-COV-2- CYCLE NUMBER: -1

## 2021-07-30 ENCOUNTER — TELEPHONE (OUTPATIENT)
Dept: FAMILY MEDICINE | Facility: CLINIC | Age: 71
End: 2021-07-30

## 2021-09-26 ENCOUNTER — HOSPITAL ENCOUNTER (EMERGENCY)
Facility: HOSPITAL | Age: 71
Discharge: HOME | End: 2021-09-26
Attending: EMERGENCY MEDICINE
Payer: MEDICARE

## 2021-09-26 VITALS
RESPIRATION RATE: 18 BRPM | OXYGEN SATURATION: 99 % | HEIGHT: 62 IN | TEMPERATURE: 98 F | SYSTOLIC BLOOD PRESSURE: 147 MMHG | HEART RATE: 57 BPM | DIASTOLIC BLOOD PRESSURE: 83 MMHG | BODY MASS INDEX: 26.68 KG/M2 | WEIGHT: 145 LBS

## 2021-09-26 DIAGNOSIS — S76.011A MUSCLE STRAIN OF RIGHT GLUTEAL REGION, INITIAL ENCOUNTER: ICD-10-CM

## 2021-09-26 DIAGNOSIS — S39.012A LUMBAR STRAIN, INITIAL ENCOUNTER: Primary | ICD-10-CM

## 2021-09-26 PROCEDURE — 99284 EMERGENCY DEPT VISIT MOD MDM: CPT | Mod: 25

## 2021-09-26 PROCEDURE — 63600175 PHARM REV CODE 636 W HCPCS: Performed by: EMERGENCY MEDICINE

## 2021-09-26 PROCEDURE — 96372 THER/PROPH/DIAG INJ SC/IM: CPT

## 2021-09-26 RX ORDER — PREDNISONE 20 MG/1
60 TABLET ORAL DAILY
Qty: 9 TABLET | Refills: 0 | Status: SHIPPED | OUTPATIENT
Start: 2021-09-26 | End: 2021-09-29

## 2021-09-26 RX ORDER — HYDROCODONE BITARTRATE AND ACETAMINOPHEN 5; 325 MG/1; MG/1
1 TABLET ORAL EVERY 4 HOURS PRN
Qty: 12 TABLET | Refills: 0 | Status: SHIPPED | OUTPATIENT
Start: 2021-09-26 | End: 2021-10-06

## 2021-09-26 RX ORDER — DEXAMETHASONE SODIUM PHOSPHATE 4 MG/ML
8 INJECTION, SOLUTION INTRA-ARTICULAR; INTRALESIONAL; INTRAMUSCULAR; INTRAVENOUS; SOFT TISSUE
Status: COMPLETED | OUTPATIENT
Start: 2021-09-26 | End: 2021-09-26

## 2021-09-26 RX ADMIN — DEXAMETHASONE SODIUM PHOSPHATE 8 MG: 4 INJECTION INTRA-ARTICULAR; INTRALESIONAL; INTRAMUSCULAR; INTRAVENOUS; SOFT TISSUE at 09:09

## 2022-06-02 DIAGNOSIS — E78.00 PURE HYPERCHOLESTEROLEMIA: ICD-10-CM

## 2022-06-02 DIAGNOSIS — E05.90 HYPERTHYROIDISM: ICD-10-CM

## 2022-06-02 RX ORDER — METHIMAZOLE 5 MG/1
TABLET ORAL
Qty: 90 TABLET | Refills: 0 | Status: SHIPPED | OUTPATIENT
Start: 2022-06-02 | End: 2022-09-12

## 2022-06-02 RX ORDER — FENOFIBRATE 160 MG/1
TABLET ORAL
Qty: 90 TABLET | Refills: 0 | Status: SHIPPED | OUTPATIENT
Start: 2022-06-02 | End: 2022-09-09

## 2022-06-02 NOTE — TELEPHONE ENCOUNTER
Refill Routing Note   Medication(s) are not appropriate for processing by [facility] for the following reason(s):      - Required laboratory values are outdated  - Patient has been seen in the ED/Hospital since the last PCP visit    [facility] action(s):  Defer                Appointments  past 12m or future 3m with PCP    Date Provider   Last Visit   5/12/2021 Loretta Rg MD   Next Visit   8/2/2022 Loretta Rg MD   ED visits in past 90 days: 0        Note composed:11:28 AM 06/02/2022

## 2022-06-02 NOTE — TELEPHONE ENCOUNTER
Care Due:                  Date            Visit Type   Department     Provider  --------------------------------------------------------------------------------                                [facility]                              PRIMARY      MED/ INTERNAL  Last Visit: 07-      CARE ([facility])   MED/ PEDS      Marine Campos                              [facility]                              PRIMARY      MED/ INTERNAL  Next Visit: 08-      CARE ([facility])   MED/ PEDS      Loretta Antoine                                                            Last  Test          Frequency    Reason                     Performed    Due Date  --------------------------------------------------------------------------------    CBC.........  12 months..  fenofibrate..............  05-   05-    CMP.........  12 months..  fenofibrate..............  05-   05-    Lipid Panel.  12 months..  fenofibrate..............  05-   05-    [facility] Embedded Care Gaps. Reference number: [id number]. 6/02/2022   9:35:58 AM CDT

## 2022-08-02 ENCOUNTER — OFFICE VISIT (OUTPATIENT)
Dept: FAMILY MEDICINE | Facility: CLINIC | Age: 72
End: 2022-08-02
Payer: MEDICARE

## 2022-08-02 ENCOUNTER — LAB VISIT (OUTPATIENT)
Dept: LAB | Facility: HOSPITAL | Age: 72
End: 2022-08-02
Attending: FAMILY MEDICINE
Payer: MEDICARE

## 2022-08-02 VITALS
BODY MASS INDEX: 26.33 KG/M2 | WEIGHT: 143.06 LBS | SYSTOLIC BLOOD PRESSURE: 122 MMHG | OXYGEN SATURATION: 99 % | DIASTOLIC BLOOD PRESSURE: 62 MMHG | HEART RATE: 73 BPM | TEMPERATURE: 98 F | HEIGHT: 62 IN

## 2022-08-02 DIAGNOSIS — Z00.00 ANNUAL PHYSICAL EXAM: Primary | ICD-10-CM

## 2022-08-02 DIAGNOSIS — E78.49 OTHER HYPERLIPIDEMIA: ICD-10-CM

## 2022-08-02 DIAGNOSIS — Z00.00 ANNUAL PHYSICAL EXAM: ICD-10-CM

## 2022-08-02 DIAGNOSIS — Z78.0 MENOPAUSE: ICD-10-CM

## 2022-08-02 DIAGNOSIS — E05.90 HYPERTHYROIDISM: ICD-10-CM

## 2022-08-02 DIAGNOSIS — Z12.31 SCREENING MAMMOGRAM FOR BREAST CANCER: ICD-10-CM

## 2022-08-02 DIAGNOSIS — N81.10 BLADDER PROLAPSE, FEMALE, ACQUIRED: ICD-10-CM

## 2022-08-02 LAB
ALBUMIN SERPL BCP-MCNC: 4.7 G/DL (ref 3.5–5.2)
ALP SERPL-CCNC: 64 U/L (ref 55–135)
ALT SERPL W/O P-5'-P-CCNC: 24 U/L (ref 10–44)
ANION GAP SERPL CALC-SCNC: 9 MMOL/L (ref 8–16)
AST SERPL-CCNC: 20 U/L (ref 10–40)
BASOPHILS # BLD AUTO: 0.03 K/UL (ref 0–0.2)
BASOPHILS NFR BLD: 0.6 % (ref 0–1.9)
BILIRUB SERPL-MCNC: 0.5 MG/DL (ref 0.1–1)
BUN SERPL-MCNC: 18 MG/DL (ref 8–23)
CALCIUM SERPL-MCNC: 10.3 MG/DL (ref 8.7–10.5)
CHLORIDE SERPL-SCNC: 105 MMOL/L (ref 95–110)
CHOLEST SERPL-MCNC: 238 MG/DL (ref 120–199)
CHOLEST/HDLC SERPL: 4.1 {RATIO} (ref 2–5)
CO2 SERPL-SCNC: 26 MMOL/L (ref 23–29)
CREAT SERPL-MCNC: 0.7 MG/DL (ref 0.5–1.4)
DIFFERENTIAL METHOD: ABNORMAL
EOSINOPHIL # BLD AUTO: 0.1 K/UL (ref 0–0.5)
EOSINOPHIL NFR BLD: 1.5 % (ref 0–8)
ERYTHROCYTE [DISTWIDTH] IN BLOOD BY AUTOMATED COUNT: 11.9 % (ref 11.5–14.5)
EST. GFR  (NO RACE VARIABLE): >60 ML/MIN/1.73 M^2
GLUCOSE SERPL-MCNC: 97 MG/DL (ref 70–110)
HCT VFR BLD AUTO: 42.1 % (ref 37–48.5)
HDLC SERPL-MCNC: 58 MG/DL (ref 40–75)
HDLC SERPL: 24.4 % (ref 20–50)
HGB BLD-MCNC: 13.9 G/DL (ref 12–16)
IMM GRANULOCYTES # BLD AUTO: 0.02 K/UL (ref 0–0.04)
IMM GRANULOCYTES NFR BLD AUTO: 0.4 % (ref 0–0.5)
LDLC SERPL CALC-MCNC: 149.2 MG/DL (ref 63–159)
LYMPHOCYTES # BLD AUTO: 2.3 K/UL (ref 1–4.8)
LYMPHOCYTES NFR BLD: 43.6 % (ref 18–48)
MCH RBC QN AUTO: 32.2 PG (ref 27–31)
MCHC RBC AUTO-ENTMCNC: 33 G/DL (ref 32–36)
MCV RBC AUTO: 98 FL (ref 82–98)
MONOCYTES # BLD AUTO: 0.6 K/UL (ref 0.3–1)
MONOCYTES NFR BLD: 11.2 % (ref 4–15)
NEUTROPHILS # BLD AUTO: 2.2 K/UL (ref 1.8–7.7)
NEUTROPHILS NFR BLD: 42.7 % (ref 38–73)
NONHDLC SERPL-MCNC: 180 MG/DL
NRBC BLD-RTO: 0 /100 WBC
PLATELET # BLD AUTO: 391 K/UL (ref 150–450)
PMV BLD AUTO: 10.5 FL (ref 9.2–12.9)
POTASSIUM SERPL-SCNC: 5 MMOL/L (ref 3.5–5.1)
PROT SERPL-MCNC: 7.5 G/DL (ref 6–8.4)
RBC # BLD AUTO: 4.32 M/UL (ref 4–5.4)
SODIUM SERPL-SCNC: 140 MMOL/L (ref 136–145)
TRIGL SERPL-MCNC: 154 MG/DL (ref 30–150)
TSH SERPL DL<=0.005 MIU/L-ACNC: 0.47 UIU/ML (ref 0.4–4)
WBC # BLD AUTO: 5.25 K/UL (ref 3.9–12.7)

## 2022-08-02 PROCEDURE — 84443 ASSAY THYROID STIM HORMONE: CPT | Performed by: FAMILY MEDICINE

## 2022-08-02 PROCEDURE — 80053 COMPREHEN METABOLIC PANEL: CPT | Performed by: FAMILY MEDICINE

## 2022-08-02 PROCEDURE — 36415 COLL VENOUS BLD VENIPUNCTURE: CPT | Mod: PO | Performed by: FAMILY MEDICINE

## 2022-08-02 PROCEDURE — 99999 PR PBB SHADOW E&M-EST. PATIENT-LVL IV: ICD-10-PCS | Mod: PBBFAC,,, | Performed by: FAMILY MEDICINE

## 2022-08-02 PROCEDURE — 1159F PR MEDICATION LIST DOCUMENTED IN MEDICAL RECORD: ICD-10-PCS | Mod: CPTII,S$GLB,, | Performed by: FAMILY MEDICINE

## 2022-08-02 PROCEDURE — 1126F AMNT PAIN NOTED NONE PRSNT: CPT | Mod: CPTII,S$GLB,, | Performed by: FAMILY MEDICINE

## 2022-08-02 PROCEDURE — 3288F PR FALLS RISK ASSESSMENT DOCUMENTED: ICD-10-PCS | Mod: CPTII,S$GLB,, | Performed by: FAMILY MEDICINE

## 2022-08-02 PROCEDURE — 1126F PR PAIN SEVERITY QUANTIFIED, NO PAIN PRESENT: ICD-10-PCS | Mod: CPTII,S$GLB,, | Performed by: FAMILY MEDICINE

## 2022-08-02 PROCEDURE — 3074F SYST BP LT 130 MM HG: CPT | Mod: CPTII,S$GLB,, | Performed by: FAMILY MEDICINE

## 2022-08-02 PROCEDURE — 3008F BODY MASS INDEX DOCD: CPT | Mod: CPTII,S$GLB,, | Performed by: FAMILY MEDICINE

## 2022-08-02 PROCEDURE — 3288F FALL RISK ASSESSMENT DOCD: CPT | Mod: CPTII,S$GLB,, | Performed by: FAMILY MEDICINE

## 2022-08-02 PROCEDURE — 3074F PR MOST RECENT SYSTOLIC BLOOD PRESSURE < 130 MM HG: ICD-10-PCS | Mod: CPTII,S$GLB,, | Performed by: FAMILY MEDICINE

## 2022-08-02 PROCEDURE — 99397 PER PM REEVAL EST PAT 65+ YR: CPT | Mod: GZ,S$GLB,, | Performed by: FAMILY MEDICINE

## 2022-08-02 PROCEDURE — 3078F DIAST BP <80 MM HG: CPT | Mod: CPTII,S$GLB,, | Performed by: FAMILY MEDICINE

## 2022-08-02 PROCEDURE — 3078F PR MOST RECENT DIASTOLIC BLOOD PRESSURE < 80 MM HG: ICD-10-PCS | Mod: CPTII,S$GLB,, | Performed by: FAMILY MEDICINE

## 2022-08-02 PROCEDURE — 3008F PR BODY MASS INDEX (BMI) DOCUMENTED: ICD-10-PCS | Mod: CPTII,S$GLB,, | Performed by: FAMILY MEDICINE

## 2022-08-02 PROCEDURE — 1159F MED LIST DOCD IN RCRD: CPT | Mod: CPTII,S$GLB,, | Performed by: FAMILY MEDICINE

## 2022-08-02 PROCEDURE — 1160F RVW MEDS BY RX/DR IN RCRD: CPT | Mod: CPTII,S$GLB,, | Performed by: FAMILY MEDICINE

## 2022-08-02 PROCEDURE — 99397 PR PREVENTIVE VISIT,EST,65 & OVER: ICD-10-PCS | Mod: GZ,S$GLB,, | Performed by: FAMILY MEDICINE

## 2022-08-02 PROCEDURE — 1160F PR REVIEW ALL MEDS BY PRESCRIBER/CLIN PHARMACIST DOCUMENTED: ICD-10-PCS | Mod: CPTII,S$GLB,, | Performed by: FAMILY MEDICINE

## 2022-08-02 PROCEDURE — 85025 COMPLETE CBC W/AUTO DIFF WBC: CPT | Performed by: FAMILY MEDICINE

## 2022-08-02 PROCEDURE — 1101F PR PT FALLS ASSESS DOC 0-1 FALLS W/OUT INJ PAST YR: ICD-10-PCS | Mod: CPTII,S$GLB,, | Performed by: FAMILY MEDICINE

## 2022-08-02 PROCEDURE — 1101F PT FALLS ASSESS-DOCD LE1/YR: CPT | Mod: CPTII,S$GLB,, | Performed by: FAMILY MEDICINE

## 2022-08-02 PROCEDURE — 99999 PR PBB SHADOW E&M-EST. PATIENT-LVL IV: CPT | Mod: PBBFAC,,, | Performed by: FAMILY MEDICINE

## 2022-08-02 PROCEDURE — 80061 LIPID PANEL: CPT | Performed by: FAMILY MEDICINE

## 2022-08-02 NOTE — PROGRESS NOTES
Assessment & Plan  Problem List Items Addressed This Visit        Cardiac/Vascular    Other hyperlipidemia    Relevant Orders    CBC Auto Differential    Comprehensive Metabolic Panel    Lipid Panel    TSH       Endocrine    Hyperthyroidism    Relevant Orders    CBC Auto Differential    Comprehensive Metabolic Panel    Lipid Panel    TSH      Other Visit Diagnoses     Annual physical exam    -  Primary    Relevant Orders    CBC Auto Differential    Comprehensive Metabolic Panel    Lipid Panel    TSH    Screening mammogram for breast cancer        Relevant Orders    Mammo Digital Screening Bilat    Menopause        Relevant Orders    DXA Bone Density Spine And Hip    Bladder prolapse, female, acquired        Relevant Orders    Ambulatory referral/consult to Obstetrics / Gynecology      I addressed all major concerns as it related to health maintenance.  All were ordered and scheduled based on the patients wishes.  Any additional health maintenance will be readdressed at the next physical if declined or deferred by the patient.      Health Maintenance reviewed.    Follow-up: No follow-ups on file.    ______________________________________________________________________    Chief Complaint  Chief Complaint   Patient presents with    Annual Exam       HPI  Meera Britt is a 71 y.o. female with multiple medical diagnoses as listed in the medical history and problem list that presents for annual exam.  Pt is known to me with last appointment 5/12/2021.      Outside of annual complaints  Ear pressure: she has noted increase pressure in her right ear.  She has noted hearing changes.  She notes that her  will play loud volumes.  This has caused hearing damage for her.  No drainage is noted.       PAST MEDICAL HISTORY:  Past Medical History:   Diagnosis Date    Allergy     Arthritis     Endometrial cancer 1980    GERD (gastroesophageal reflux disease)     Hyperlipidemia     Thyroid disease        PAST  SURGICAL HISTORY:  Past Surgical History:   Procedure Laterality Date    COLONOSCOPY N/A 10/26/2017    Procedure: COLONOSCOPY;  Surgeon: Heladio Gonzalez MD;  Location: [facility];  Service: Endoscopy;  Laterality: N/A;    HYSTERECTOMY  1980    OOPHORECTOMY      TONSILLECTOMY         SOCIAL HISTORY:  Social History     Socioeconomic History    Marital status:    Tobacco Use    Smoking status: Never Smoker    Smokeless tobacco: Never Used   Substance and Sexual Activity    Alcohol use: No    Drug use: Never       FAMILY HISTORY:  History reviewed. No pertinent family history.    ALLERGIES AND MEDICATIONS: updated and reviewed.  Review of patient's allergies indicates:   Allergen Reactions    Iodine and iodide containing products      bloating    Sulfa (sulfonamide antibiotics) Nausea And Vomiting     Current Outpatient Medications   Medication Sig Dispense Refill    fenofibrate 160 MG Tab Take 1 tablet by mouth once daily 90 tablet 0    fexofenadine/pseudoephedrine (ALLERGY RELIEF D ORAL) Take by mouth.      flaxseed oil Oil 1,200 mg by Misc.(Non-Drug; Combo Route) route 2 (two) times daily.      magnesium oxide 400 mg Cap Take 1 capsule by mouth once.      methIMAzole (TAPAZOLE) 5 MG Tab Take 1 tablet by mouth once daily 90 tablet 0    omega-3 fatty acids/fish oil (FISH OIL-OMEGA-3 FATTY ACIDS) 300-1,000 mg capsule Take by mouth once daily.      pantoprazole (PROTONIX) 20 MG tablet Take 20 mg by mouth once daily.       Current Facility-Administered Medications   Medication Dose Route Frequency Provider Last Rate Last Admin    dexamethasone injection 8 mg  8 mg Intramuscular 1 time in Clinic/HOD WALI Mandel  Review of Systems   Constitutional: Negative for activity change, appetite change, fatigue, fever and unexpected weight change.   HENT: Positive for ear pain and sinus pressure. Negative for ear discharge, rhinorrhea and sore throat.         Ear pressure   "  Eyes: Negative.    Respiratory: Negative for apnea, cough, chest tightness, shortness of breath and wheezing.    Cardiovascular: Negative for chest pain, palpitations and leg swelling.   Gastrointestinal: Negative for abdominal distention, abdominal pain, constipation, diarrhea and vomiting.   Endocrine: Negative for cold intolerance, heat intolerance, polydipsia and polyuria.   Genitourinary: Negative for decreased urine volume and urgency.   Musculoskeletal: Negative.    Skin: Negative for rash.   Neurological: Negative for dizziness and headaches.   Hematological: Does not bruise/bleed easily.   Psychiatric/Behavioral: Negative for agitation, sleep disturbance and suicidal ideas.           Physical Exam  Vitals:    08/02/22 0743   BP: 122/62   Pulse: 73   Temp: 98.3 °F (36.8 °C)   TempSrc: Oral   SpO2: 99%   Weight: 64.9 kg (143 lb 1.3 oz)   Height: 5' 2" (1.575 m)    Body mass index is 26.17 kg/m².  Weight: 64.9 kg (143 lb 1.3 oz)   Height: 5' 2" (157.5 cm)   Physical Exam  Vitals reviewed.   Constitutional:       Appearance: She is well-developed.   HENT:      Head: Normocephalic and atraumatic.      Right Ear: External ear normal.      Left Ear: External ear normal.      Nose: Nose normal.   Eyes:      Conjunctiva/sclera: Conjunctivae normal.      Pupils: Pupils are equal, round, and reactive to light.   Cardiovascular:      Rate and Rhythm: Normal rate and regular rhythm.      Heart sounds: Normal heart sounds.   Pulmonary:      Effort: Pulmonary effort is normal.      Breath sounds: Normal breath sounds.   Skin:     General: Skin is warm and dry.   Neurological:      Mental Status: She is alert and oriented to person, place, and time.           Health Maintenance       Date Due Completion Date    DEXA Scan 11/01/2021 11/1/2018    Mammogram 11/25/2021 11/25/2020    COVID-19 Vaccine (3 - Moderna risk series) 01/11/2022 12/14/2021    Colorectal Cancer Screening 10/26/2022 10/26/2017    Influenza Vaccine (1) " 09/01/2022 2/12/2021    Lipid Panel 05/20/2026 5/20/2021    TETANUS VACCINE 09/22/2031 9/22/2021              Patient note was created using [employer].  Any errors in syntax or even information may not have been identified and edited on initial review prior to signing this note.

## 2022-08-22 ENCOUNTER — HOSPITAL ENCOUNTER (OUTPATIENT)
Dept: RADIOLOGY | Facility: CLINIC | Age: 72
Discharge: HOME | End: 2022-08-22
Attending: FAMILY MEDICINE
Payer: MEDICARE

## 2022-08-22 ENCOUNTER — HOSPITAL ENCOUNTER (OUTPATIENT)
Dept: RADIOLOGY | Facility: HOSPITAL | Age: 72
Discharge: HOME | End: 2022-08-22
Attending: FAMILY MEDICINE
Payer: MEDICARE

## 2022-08-22 DIAGNOSIS — Z78.0 MENOPAUSE: ICD-10-CM

## 2022-08-22 DIAGNOSIS — Z12.31 SCREENING MAMMOGRAM FOR BREAST CANCER: ICD-10-CM

## 2022-08-22 PROCEDURE — 77067 SCR MAMMO BI INCL CAD: CPT | Mod: 26,,, | Performed by: RADIOLOGY

## 2022-08-22 PROCEDURE — 77063 MAMMO DIGITAL SCREENING BILAT WITH TOMO: ICD-10-PCS | Mod: 26,,, | Performed by: RADIOLOGY

## 2022-08-22 PROCEDURE — 77067 MAMMO DIGITAL SCREENING BILAT WITH TOMO: ICD-10-PCS | Mod: 26,,, | Performed by: RADIOLOGY

## 2022-08-22 PROCEDURE — 77080 DXA BONE DENSITY AXIAL: CPT | Mod: TC,PO

## 2022-08-22 PROCEDURE — 77080 DXA BONE DENSITY AXIAL: CPT | Mod: 26,,, | Performed by: INTERNAL MEDICINE

## 2022-08-22 PROCEDURE — 77063 BREAST TOMOSYNTHESIS BI: CPT | Mod: 26,,, | Performed by: RADIOLOGY

## 2022-08-22 PROCEDURE — 77067 SCR MAMMO BI INCL CAD: CPT | Mod: TC,PO

## 2022-08-22 PROCEDURE — 77080 DEXA BONE DENSITY SPINE HIP: ICD-10-PCS | Mod: 26,,, | Performed by: INTERNAL MEDICINE

## 2022-08-22 PROCEDURE — 77063 BREAST TOMOSYNTHESIS BI: CPT | Mod: TC,PO

## 2022-09-09 DIAGNOSIS — E05.90 HYPERTHYROIDISM: ICD-10-CM

## 2022-09-09 DIAGNOSIS — E78.00 PURE HYPERCHOLESTEROLEMIA: ICD-10-CM

## 2022-09-09 RX ORDER — FENOFIBRATE 160 MG/1
160 TABLET ORAL DAILY
Qty: 90 TABLET | Refills: 3 | Status: SHIPPED | OUTPATIENT
Start: 2022-09-09 | End: 2023-09-06

## 2022-09-09 NOTE — TELEPHONE ENCOUNTER
Refill Routing Note   Medication(s) are not appropriate for processing by [facility] for the following reason(s):      - Outside of protocol (non-delegated)    [facility] action(s):  Route  Approve          Medication reconciliation completed: No     Appointments  past 12m or future 3m with PCP    Date Provider   Last Visit   8/2/2022 Loretta Rg MD   Next Visit   Visit date not found Loretta Rg MD   ED visits in past 90 days: 0        Note composed:2:26 PM 09/09/2022

## 2022-09-09 NOTE — TELEPHONE ENCOUNTER
No new care gaps identified.  [facility] Embedded Care Gaps. Reference number: [id number]. 9/09/2022   10:40:35 AM JEEVANT

## 2022-09-12 RX ORDER — METHIMAZOLE 5 MG/1
TABLET ORAL
Qty: 90 TABLET | Refills: 0 | Status: SHIPPED | OUTPATIENT
Start: 2022-09-12 | End: 2022-12-12

## 2022-09-20 ENCOUNTER — OFFICE VISIT (OUTPATIENT)
Dept: OBSTETRICS AND GYNECOLOGY | Facility: CLINIC | Age: 72
End: 2022-09-20
Payer: MEDICARE

## 2022-09-20 VITALS
SYSTOLIC BLOOD PRESSURE: 120 MMHG | BODY MASS INDEX: 26.94 KG/M2 | DIASTOLIC BLOOD PRESSURE: 70 MMHG | WEIGHT: 147.25 LBS

## 2022-09-20 DIAGNOSIS — N81.10 BLADDER PROLAPSE, FEMALE, ACQUIRED: ICD-10-CM

## 2022-09-20 PROCEDURE — 3078F DIAST BP <80 MM HG: CPT | Mod: CPTII,S$GLB,, | Performed by: OBSTETRICS & GYNECOLOGY

## 2022-09-20 PROCEDURE — 3288F FALL RISK ASSESSMENT DOCD: CPT | Mod: CPTII,S$GLB,, | Performed by: OBSTETRICS & GYNECOLOGY

## 2022-09-20 PROCEDURE — 3288F PR FALLS RISK ASSESSMENT DOCUMENTED: ICD-10-PCS | Mod: CPTII,S$GLB,, | Performed by: OBSTETRICS & GYNECOLOGY

## 2022-09-20 PROCEDURE — 3008F PR BODY MASS INDEX (BMI) DOCUMENTED: ICD-10-PCS | Mod: CPTII,S$GLB,, | Performed by: OBSTETRICS & GYNECOLOGY

## 2022-09-20 PROCEDURE — 3074F SYST BP LT 130 MM HG: CPT | Mod: CPTII,S$GLB,, | Performed by: OBSTETRICS & GYNECOLOGY

## 2022-09-20 PROCEDURE — 3008F BODY MASS INDEX DOCD: CPT | Mod: CPTII,S$GLB,, | Performed by: OBSTETRICS & GYNECOLOGY

## 2022-09-20 PROCEDURE — 99203 OFFICE O/P NEW LOW 30 MIN: CPT | Mod: S$GLB,,, | Performed by: OBSTETRICS & GYNECOLOGY

## 2022-09-20 PROCEDURE — 99203 PR OFFICE/OUTPT VISIT, NEW, LEVL III, 30-44 MIN: ICD-10-PCS | Mod: S$GLB,,, | Performed by: OBSTETRICS & GYNECOLOGY

## 2022-09-20 PROCEDURE — 1159F MED LIST DOCD IN RCRD: CPT | Mod: CPTII,S$GLB,, | Performed by: OBSTETRICS & GYNECOLOGY

## 2022-09-20 PROCEDURE — 3074F PR MOST RECENT SYSTOLIC BLOOD PRESSURE < 130 MM HG: ICD-10-PCS | Mod: CPTII,S$GLB,, | Performed by: OBSTETRICS & GYNECOLOGY

## 2022-09-20 PROCEDURE — 1159F PR MEDICATION LIST DOCUMENTED IN MEDICAL RECORD: ICD-10-PCS | Mod: CPTII,S$GLB,, | Performed by: OBSTETRICS & GYNECOLOGY

## 2022-09-20 PROCEDURE — 3078F PR MOST RECENT DIASTOLIC BLOOD PRESSURE < 80 MM HG: ICD-10-PCS | Mod: CPTII,S$GLB,, | Performed by: OBSTETRICS & GYNECOLOGY

## 2022-09-20 PROCEDURE — 1101F PR PT FALLS ASSESS DOC 0-1 FALLS W/OUT INJ PAST YR: ICD-10-PCS | Mod: CPTII,S$GLB,, | Performed by: OBSTETRICS & GYNECOLOGY

## 2022-09-20 PROCEDURE — 99999 PR PBB SHADOW E&M-EST. PATIENT-LVL III: ICD-10-PCS | Mod: PBBFAC,,, | Performed by: OBSTETRICS & GYNECOLOGY

## 2022-09-20 PROCEDURE — 1101F PT FALLS ASSESS-DOCD LE1/YR: CPT | Mod: CPTII,S$GLB,, | Performed by: OBSTETRICS & GYNECOLOGY

## 2022-09-20 PROCEDURE — 99999 PR PBB SHADOW E&M-EST. PATIENT-LVL III: CPT | Mod: PBBFAC,,, | Performed by: OBSTETRICS & GYNECOLOGY

## 2022-09-20 NOTE — PROGRESS NOTES
SUBJECTIVE:   72 y.o. female   for prolapse     No LMP recorded. Patient has had a hysterectomy..        Pt reported noted something coming out about 1 year ago, sometimes having to push it in when she sits   noted as well  +lower abdominal pressure, denies pain  Denies vaginal bleeding  Denies changes in urinary habit or bowel movement. (Having BM every 1-2 weeks are normal for her)  S/p MAR/RSO in  for endometriosis   H/o HRT but has had issue with [facility] - so she discontinued       OB History    Para Term  AB Living   1 1 1         SAB IAB Ectopic Multiple Live Births                  # Outcome Date GA Lbr Nelson/2nd Weight Sex Delivery Anes PTL Lv   1 Term               Obstetric Comments   CD x 1   Endometriosis   S/p MAR/RSO in her 20s for endometriosis, h/o HRT x few years, no longer using   Denies abnl pap     Past Medical History:   Diagnosis Date    Allergy     Arthritis     Endometrial cancer     GERD (gastroesophageal reflux disease)     Hyperlipidemia     Thyroid disease      Past Surgical History:   Procedure Laterality Date    COLONOSCOPY N/A 10/26/2017    Procedure: COLONOSCOPY;  Surgeon: Heladio Gonzalez MD;  Location: [facility];  Service: Endoscopy;  Laterality: N/A;    HYSTERECTOMY      OOPHORECTOMY      TONSILLECTOMY       Social History     Socioeconomic History    Marital status:    Tobacco Use    Smoking status: Never    Smokeless tobacco: Never   Substance and Sexual Activity    Alcohol use: No    Drug use: Never     No family history on file.      Current Outpatient Medications   Medication Sig Dispense Refill    fenofibrate 160 MG Tab Take 1 tablet (160 mg total) by mouth once daily. 90 tablet 3    fexofenadine/pseudoephedrine (ALLERGY RELIEF D ORAL) Take by mouth.      flaxseed oil Oil 1,200 mg by Misc.(Non-Drug; Combo Route) route 2 (two) times daily.      magnesium oxide 400 mg Cap Take 1 capsule by mouth once.      methIMAzole (TAPAZOLE) 5 MG  Tab Take 1 tablet by mouth once daily 90 tablet 0    omega-3 fatty acids/fish oil (FISH OIL-OMEGA-3 FATTY ACIDS) 300-1,000 mg capsule Take by mouth once daily.      pantoprazole (PROTONIX) 20 MG tablet Take 20 mg by mouth once daily.       Current Facility-Administered Medications   Medication Dose Route Frequency Provider Last Rate Last Admin    dexamethasone injection 8 mg  8 mg Intramuscular 1 time in Clinic/HOD Laina Erwin PA-C         Allergies: Iodine and iodide containing products and Sulfa (sulfonamide antibiotics)       ROS  ROS:  GENERAL: Denies weight gain or weight loss. Feeling well overall.   SKIN: Denies rash or lesions.   HEAD: Denies head injury or headache.   NODES: Denies enlarged lymph nodes.   CHEST: Denies chest pain or shortness of breath.   CARDIOVASCULAR: Denies palpitations or left sided chest pain.   ABDOMEN: No abdominal pain, constipation, diarrhea, nausea, vomiting or rectal bleeding.   URINARY: No frequency, dysuria, hematuria, or burning on urination.  REPRODUCTIVE: see HPI  BREASTS: The patient performs breast self-examination and denies pain, lumps, or nipple discharge.   HEMATOLOGIC: No easy bruisability or excessive bleeding.  MUSCULOSKELETAL: Denies joint pain or swelling.   NEUROLOGIC: Denies syncope or weakness.   PSYCHIATRIC: Denies depression, anxiety or mood swings.      OBJECTIVE:   /70   Wt 66.8 kg (147 lb 4.3 oz)   BMI 26.94 kg/m²   The patient appears well, alert, oriented x 3, in no distress.  NECK: negative, no thyromegaly, trachea midline  SKIN: normal, good color, good turgor, and no acne, striae, hirsutism  BREAST EXAM: not examined  ABDOMEN: soft, non-tender; bowel sounds normal; no masses,  no organomegaly and no hernias, masses, or hepatosplenomegaly  BLADDER: soft  GENITALIA: normal external genitalia, no erythema, no discharge  URETHRA: normal appearing urethra with no masses, tenderness or lesions and normal urethra, normal urethral  meatus  VAGINA: cystocele stage 2-3 .   CERVIX: absent  UTERUS: uterus absent  ADNEXA: no mass, fullness, tenderness      ASSESSMENT:   .Meera was seen today for vaginal prolapse.    Diagnoses and all orders for this visit:    Bladder prolapse, female, acquired  -     discussed continue with lazaro, discussed options of pessary vs. Surgery.  Pt desires pessary fitting, rtc in 1-2 weeks

## 2022-10-12 ENCOUNTER — OFFICE VISIT (OUTPATIENT)
Dept: OBSTETRICS AND GYNECOLOGY | Facility: CLINIC | Age: 72
End: 2022-10-12
Payer: MEDICARE

## 2022-10-12 VITALS — DIASTOLIC BLOOD PRESSURE: 70 MMHG | SYSTOLIC BLOOD PRESSURE: 120 MMHG | WEIGHT: 145.5 LBS | BODY MASS INDEX: 26.61 KG/M2

## 2022-10-12 DIAGNOSIS — N81.10 BLADDER PROLAPSE, FEMALE, ACQUIRED: Primary | ICD-10-CM

## 2022-10-12 DIAGNOSIS — Z46.89 ENCOUNTER FOR FITTING AND ADJUSTMENT OF PESSARY: ICD-10-CM

## 2022-10-12 PROCEDURE — 3288F PR FALLS RISK ASSESSMENT DOCUMENTED: ICD-10-PCS | Mod: CPTII,S$GLB,, | Performed by: OBSTETRICS & GYNECOLOGY

## 2022-10-12 PROCEDURE — 57160 PR FIT/INSERT INTRAVAG SUPPORT DEVICE: ICD-10-PCS | Mod: S$GLB,,, | Performed by: OBSTETRICS & GYNECOLOGY

## 2022-10-12 PROCEDURE — 3008F PR BODY MASS INDEX (BMI) DOCUMENTED: ICD-10-PCS | Mod: CPTII,S$GLB,, | Performed by: OBSTETRICS & GYNECOLOGY

## 2022-10-12 PROCEDURE — 3074F PR MOST RECENT SYSTOLIC BLOOD PRESSURE < 130 MM HG: ICD-10-PCS | Mod: CPTII,S$GLB,, | Performed by: OBSTETRICS & GYNECOLOGY

## 2022-10-12 PROCEDURE — 99499 UNLISTED E&M SERVICE: CPT | Mod: S$GLB,,, | Performed by: OBSTETRICS & GYNECOLOGY

## 2022-10-12 PROCEDURE — 99999 PR PBB SHADOW E&M-EST. PATIENT-LVL II: CPT | Mod: PBBFAC,,, | Performed by: OBSTETRICS & GYNECOLOGY

## 2022-10-12 PROCEDURE — 3008F BODY MASS INDEX DOCD: CPT | Mod: CPTII,S$GLB,, | Performed by: OBSTETRICS & GYNECOLOGY

## 2022-10-12 PROCEDURE — 3078F PR MOST RECENT DIASTOLIC BLOOD PRESSURE < 80 MM HG: ICD-10-PCS | Mod: CPTII,S$GLB,, | Performed by: OBSTETRICS & GYNECOLOGY

## 2022-10-12 PROCEDURE — 3288F FALL RISK ASSESSMENT DOCD: CPT | Mod: CPTII,S$GLB,, | Performed by: OBSTETRICS & GYNECOLOGY

## 2022-10-12 PROCEDURE — 1101F PT FALLS ASSESS-DOCD LE1/YR: CPT | Mod: CPTII,S$GLB,, | Performed by: OBSTETRICS & GYNECOLOGY

## 2022-10-12 PROCEDURE — 1101F PR PT FALLS ASSESS DOC 0-1 FALLS W/OUT INJ PAST YR: ICD-10-PCS | Mod: CPTII,S$GLB,, | Performed by: OBSTETRICS & GYNECOLOGY

## 2022-10-12 PROCEDURE — 1159F PR MEDICATION LIST DOCUMENTED IN MEDICAL RECORD: ICD-10-PCS | Mod: CPTII,S$GLB,, | Performed by: OBSTETRICS & GYNECOLOGY

## 2022-10-12 PROCEDURE — 1159F MED LIST DOCD IN RCRD: CPT | Mod: CPTII,S$GLB,, | Performed by: OBSTETRICS & GYNECOLOGY

## 2022-10-12 PROCEDURE — 99499 NO LOS: ICD-10-PCS | Mod: S$GLB,,, | Performed by: OBSTETRICS & GYNECOLOGY

## 2022-10-12 PROCEDURE — 3078F DIAST BP <80 MM HG: CPT | Mod: CPTII,S$GLB,, | Performed by: OBSTETRICS & GYNECOLOGY

## 2022-10-12 PROCEDURE — 3074F SYST BP LT 130 MM HG: CPT | Mod: CPTII,S$GLB,, | Performed by: OBSTETRICS & GYNECOLOGY

## 2022-10-12 PROCEDURE — 99999 PR PBB SHADOW E&M-EST. PATIENT-LVL II: ICD-10-PCS | Mod: PBBFAC,,, | Performed by: OBSTETRICS & GYNECOLOGY

## 2022-10-12 PROCEDURE — 57160 INSERT PESSARY/OTHER DEVICE: CPT | Mod: S$GLB,,, | Performed by: OBSTETRICS & GYNECOLOGY

## 2022-10-12 NOTE — PROGRESS NOTES
SUBJECTIVE:   72 y.o. female   for pessary fitting    No LMP recorded. Patient has had a hysterectomy..      Pt with symptomatic pelvic organ prolapse    OB History    Para Term  AB Living   1 1 1         SAB IAB Ectopic Multiple Live Births                  # Outcome Date GA Lbr Nelson/2nd Weight Sex Delivery Anes PTL Lv   1 Term               Obstetric Comments   CD x 1   Endometriosis   S/p MAR/RSO in her 20s for endometriosis, h/o HRT x few years, no longer using   Denies abnl pap     Past Medical History:   Diagnosis Date    Allergy     Arthritis     Endometrial cancer     GERD (gastroesophageal reflux disease)     Hyperlipidemia     Thyroid disease      Past Surgical History:   Procedure Laterality Date    COLONOSCOPY N/A 10/26/2017    Procedure: COLONOSCOPY;  Surgeon: Heladio Gonzalez MD;  Location: [facility];  Service: Endoscopy;  Laterality: N/A;    HYSTERECTOMY      OOPHORECTOMY      TONSILLECTOMY       Social History     Socioeconomic History    Marital status:    Tobacco Use    Smoking status: Never    Smokeless tobacco: Never   Substance and Sexual Activity    Alcohol use: No    Drug use: Never     No family history on file.      Current Outpatient Medications   Medication Sig Dispense Refill    fenofibrate 160 MG Tab Take 1 tablet (160 mg total) by mouth once daily. 90 tablet 3    fexofenadine/pseudoephedrine (ALLERGY RELIEF D ORAL) Take by mouth.      flaxseed oil Oil 1,200 mg by Misc.(Non-Drug; Combo Route) route 2 (two) times daily.      magnesium oxide 400 mg Cap Take 1 capsule by mouth once.      methIMAzole (TAPAZOLE) 5 MG Tab Take 1 tablet by mouth once daily 90 tablet 0    omega-3 fatty acids/fish oil (FISH OIL-OMEGA-3 FATTY ACIDS) 300-1,000 mg capsule Take by mouth once daily.      pantoprazole (PROTONIX) 20 MG tablet Take 20 mg by mouth once daily.       Current Facility-Administered Medications   Medication Dose Route Frequency Provider Last Rate Last  Admin    dexamethasone injection 8 mg  8 mg Intramuscular 1 time in Clinic/HOD Laina Erwin PA-C         Allergies: Iodine and iodide containing products and Sulfa (sulfonamide antibiotics)       ROS  ROS:  GENERAL: Denies weight gain or weight loss. Feeling well overall.   SKIN: Denies rash or lesions.   HEAD: Denies head injury or headache.   NODES: Denies enlarged lymph nodes.   CHEST: Denies chest pain or shortness of breath.   CARDIOVASCULAR: Denies palpitations or left sided chest pain.   ABDOMEN: No abdominal pain, constipation, diarrhea, nausea, vomiting or rectal bleeding.   URINARY: No frequency, dysuria, hematuria, or burning on urination.  REPRODUCTIVE: Denies vaginal discharge, abnormal vaginal bleeding, lesions, pelvic pain  BREASTS: The patient performs breast self-examination and denies pain, lumps, or nipple discharge.   HEMATOLOGIC: No easy bruisability or excessive bleeding.  MUSCULOSKELETAL: Denies joint pain or swelling.   NEUROLOGIC: Denies syncope or weakness.   PSYCHIATRIC: Denies depression, anxiety or mood swings.    OBJECTIVE:   /70   Wt 66 kg (145 lb 8.1 oz)   BMI 26.61 kg/m²   The patient appears well, alert, oriented x 3, in no distress.  NECK: negative, no thyromegaly, trachea midline  ABDOMEN: soft, non-tender; bowel sounds normal; no masses,  no organomegaly and no hernias, masses, or hepatosplenomegaly  BLADDER: soft  GENITALIA: normal external genitalia, no erythema, no discharge  URETHRA: normal appearing urethra with no masses, tenderness or lesions and normal urethra, normal urethral meatus  VAGINA: cystocele stage 2-3 .   CERVIX: absent  UTERUS: uterus absent  ADNEXA: no mass, fullness, tenderness    A/P    Ring pessary with support size 3 was used. Pt was asked to perform valsalva, ambulate for 15 min, and urinate with pessary in. Pt reported no problem with urination. Denies pain. Pt was then educated on placing and removing pessary. Pt performed pessary  placement and removal well with first attempt and feel comfortable with doing it at home. Pt was sent home with size 3 ring pessary with support.    Recommend patient to remove 2x a week or once weekly and wash with warm water and soap.  Vaginal bleeding, ulceration, erosion, pain, urinary/bowel incontinence and vaginal discharge precautions given.     Pt verbalized understanding and to call for any issues.

## 2022-10-27 ENCOUNTER — OFFICE VISIT (OUTPATIENT)
Dept: OBSTETRICS AND GYNECOLOGY | Facility: CLINIC | Age: 72
End: 2022-10-27
Payer: MEDICARE

## 2022-10-27 VITALS — BODY MASS INDEX: 26.9 KG/M2 | DIASTOLIC BLOOD PRESSURE: 78 MMHG | WEIGHT: 147.06 LBS | SYSTOLIC BLOOD PRESSURE: 120 MMHG

## 2022-10-27 DIAGNOSIS — Z46.89 PESSARY MAINTENANCE: Primary | ICD-10-CM

## 2022-10-27 PROCEDURE — 1101F PR PT FALLS ASSESS DOC 0-1 FALLS W/OUT INJ PAST YR: ICD-10-PCS | Mod: CPTII,S$GLB,, | Performed by: OBSTETRICS & GYNECOLOGY

## 2022-10-27 PROCEDURE — 99213 PR OFFICE/OUTPT VISIT, EST, LEVL III, 20-29 MIN: ICD-10-PCS | Mod: S$GLB,,, | Performed by: OBSTETRICS & GYNECOLOGY

## 2022-10-27 PROCEDURE — 1159F MED LIST DOCD IN RCRD: CPT | Mod: CPTII,S$GLB,, | Performed by: OBSTETRICS & GYNECOLOGY

## 2022-10-27 PROCEDURE — 3078F PR MOST RECENT DIASTOLIC BLOOD PRESSURE < 80 MM HG: ICD-10-PCS | Mod: CPTII,S$GLB,, | Performed by: OBSTETRICS & GYNECOLOGY

## 2022-10-27 PROCEDURE — 3078F DIAST BP <80 MM HG: CPT | Mod: CPTII,S$GLB,, | Performed by: OBSTETRICS & GYNECOLOGY

## 2022-10-27 PROCEDURE — 3288F PR FALLS RISK ASSESSMENT DOCUMENTED: ICD-10-PCS | Mod: CPTII,S$GLB,, | Performed by: OBSTETRICS & GYNECOLOGY

## 2022-10-27 PROCEDURE — 3074F PR MOST RECENT SYSTOLIC BLOOD PRESSURE < 130 MM HG: ICD-10-PCS | Mod: CPTII,S$GLB,, | Performed by: OBSTETRICS & GYNECOLOGY

## 2022-10-27 PROCEDURE — 99999 PR PBB SHADOW E&M-EST. PATIENT-LVL II: CPT | Mod: PBBFAC,,, | Performed by: OBSTETRICS & GYNECOLOGY

## 2022-10-27 PROCEDURE — 3074F SYST BP LT 130 MM HG: CPT | Mod: CPTII,S$GLB,, | Performed by: OBSTETRICS & GYNECOLOGY

## 2022-10-27 PROCEDURE — 1159F PR MEDICATION LIST DOCUMENTED IN MEDICAL RECORD: ICD-10-PCS | Mod: CPTII,S$GLB,, | Performed by: OBSTETRICS & GYNECOLOGY

## 2022-10-27 PROCEDURE — 99213 OFFICE O/P EST LOW 20 MIN: CPT | Mod: S$GLB,,, | Performed by: OBSTETRICS & GYNECOLOGY

## 2022-10-27 PROCEDURE — 99999 PR PBB SHADOW E&M-EST. PATIENT-LVL II: ICD-10-PCS | Mod: PBBFAC,,, | Performed by: OBSTETRICS & GYNECOLOGY

## 2022-10-27 PROCEDURE — 3288F FALL RISK ASSESSMENT DOCD: CPT | Mod: CPTII,S$GLB,, | Performed by: OBSTETRICS & GYNECOLOGY

## 2022-10-27 PROCEDURE — 1101F PT FALLS ASSESS-DOCD LE1/YR: CPT | Mod: CPTII,S$GLB,, | Performed by: OBSTETRICS & GYNECOLOGY

## 2022-10-27 NOTE — PROGRESS NOTES
SUBJECTIVE:   72 y.o. female   for pessary f/u    No LMP recorded. Patient has had a hysterectomy..      She is doing well overall, just noted more discharge than usual so has been removing pessary daily, she noted scant red blood one time but think due to her fingernail scratching the perineum.  She denies pain or discomfort. Doing well and happy with pessary      OB History    Para Term  AB Living   1 1 1         SAB IAB Ectopic Multiple Live Births                  # Outcome Date GA Lbr Nelson/2nd Weight Sex Delivery Anes PTL Lv   1 Term               Obstetric Comments   CD x 1   Endometriosis   S/p MAR/RSO in her 20s for endometriosis, h/o HRT x few years, no longer using   Denies abnl pap     Past Medical History:   Diagnosis Date    Allergy     Arthritis     Endometrial cancer     GERD (gastroesophageal reflux disease)     Hyperlipidemia     Thyroid disease      Past Surgical History:   Procedure Laterality Date    COLONOSCOPY N/A 10/26/2017    Procedure: COLONOSCOPY;  Surgeon: Heladio Gonzalez MD;  Location: [facility];  Service: Endoscopy;  Laterality: N/A;    HYSTERECTOMY      OOPHORECTOMY      TONSILLECTOMY       Social History     Socioeconomic History    Marital status:    Tobacco Use    Smoking status: Never    Smokeless tobacco: Never   Substance and Sexual Activity    Alcohol use: No    Drug use: Never     No family history on file.      Current Outpatient Medications   Medication Sig Dispense Refill    fenofibrate 160 MG Tab Take 1 tablet (160 mg total) by mouth once daily. 90 tablet 3    fexofenadine/pseudoephedrine (ALLERGY RELIEF D ORAL) Take by mouth.      flaxseed oil Oil 1,200 mg by Misc.(Non-Drug; Combo Route) route 2 (two) times daily.      magnesium oxide 400 mg Cap Take 1 capsule by mouth once.      methIMAzole (TAPAZOLE) 5 MG Tab Take 1 tablet by mouth once daily 90 tablet 0    omega-3 fatty acids/fish oil (FISH OIL-OMEGA-3 FATTY ACIDS) 300-1,000 mg  capsule Take by mouth once daily.      pantoprazole (PROTONIX) 20 MG tablet Take 20 mg by mouth once daily.       Current Facility-Administered Medications   Medication Dose Route Frequency Provider Last Rate Last Admin    dexamethasone injection 8 mg  8 mg Intramuscular 1 time in Clinic/HOD Laina Erwin PA-C         Allergies: Iodine and iodide containing products and Sulfa (sulfonamide antibiotics)       ROS:  GENERAL: Denies weight gain or weight loss. Feeling well overall.   SKIN: Denies rash or lesions.   HEAD: Denies head injury or headache.   NODES: Denies enlarged lymph nodes.   CHEST: Denies chest pain or shortness of breath.   CARDIOVASCULAR: Denies palpitations or left sided chest pain.   ABDOMEN: No abdominal pain, constipation, diarrhea, nausea, vomiting or rectal bleeding.   URINARY: No frequency, dysuria, hematuria, or burning on urination.  REPRODUCTIVE: Denies vaginal discharge, abnormal vaginal bleeding, lesions, pelvic pain  BREASTS: The patient performs breast self-examination and denies pain, lumps, or nipple discharge.   HEMATOLOGIC: No easy bruisability or excessive bleeding.  MUSCULOSKELETAL: Denies joint pain or swelling.   NEUROLOGIC: Denies syncope or weakness.   PSYCHIATRIC: Denies depression, anxiety or mood swings.      OBJECTIVE:   /78   Wt 66.7 kg (147 lb 0.8 oz)   BMI 26.90 kg/m²   The patient appears well, alert, oriented x 3, in no distress.  NECK: negative, no thyromegaly, trachea midline  SKIN: normal, good color, good turgor, and no acne, striae, hirsutism  BREAST EXAM: breasts appear normal, no suspicious masses, no skin or nipple changes or axillary nodes  ABDOMEN: soft, non-tender; bowel sounds normal; no masses,  no organomegaly and no hernias, masses, or hepatosplenomegaly  BLADDER: soft  GENITALIA: normal external genitalia, no erythema, no discharge  URETHRA: normal appearing urethra with no masses, tenderness or lesions and normal urethra, normal urethral  meatus  VAGINA: pessary removed, small healing superficial scratch at the introitus, not bleeding.  cystocele stage 2-3  noted.  No vaginal erosion or discharge noted. Pessary cleaned and replaced  CERVIX: absent  UTERUS: uterus absent  ADNEXA: no mass, fullness, tenderness      ASSESSMENT:   Pessary maintenance  Doing well with ring pessary support size 3.  Trimo sans cream given. Advised to use gloves to assist with removal  Rtc in 2 months

## 2023-03-16 ENCOUNTER — PATIENT OUTREACH (OUTPATIENT)
Dept: ADMINISTRATIVE | Facility: HOSPITAL | Age: 73
End: 2023-03-16
Payer: MEDICARE

## 2023-03-16 DIAGNOSIS — R73.03 PRE-DIABETES: Primary | ICD-10-CM

## 2023-03-16 RX ORDER — BROMPHENIRAMINE MALEATE, PSEUDOEPHEDRINE HYDROCHLORIDE, AND DEXTROMETHORPHAN HYDROBROMIDE 2; 30; 10 MG/5ML; MG/5ML; MG/5ML
10 SYRUP ORAL EVERY 4 HOURS PRN
COMMUNITY
Start: 2023-01-10

## 2023-03-16 NOTE — PROGRESS NOTES
[facility] Annual Wellness Exam 02.20.23 - appointment scheduled on 05/12/23.    Overdue colonoscopy - to be discussed at her upcoming appointment w/ Dr. Rg.

## 2023-05-12 ENCOUNTER — TELEPHONE (OUTPATIENT)
Dept: FAMILY MEDICINE | Facility: CLINIC | Age: 73
End: 2023-05-12
Payer: MEDICARE

## 2023-05-12 ENCOUNTER — LAB VISIT (OUTPATIENT)
Dept: LAB | Facility: HOSPITAL | Age: 73
End: 2023-05-12
Attending: FAMILY MEDICINE
Payer: MEDICARE

## 2023-05-12 DIAGNOSIS — E05.90 HYPERTHYROIDISM: Primary | ICD-10-CM

## 2023-05-12 DIAGNOSIS — R73.03 PRE-DIABETES: ICD-10-CM

## 2023-05-12 DIAGNOSIS — E55.9 VITAMIN D DEFICIENCY: ICD-10-CM

## 2023-05-12 DIAGNOSIS — E78.49 OTHER HYPERLIPIDEMIA: ICD-10-CM

## 2023-05-12 DIAGNOSIS — E05.90 HYPERTHYROIDISM: ICD-10-CM

## 2023-05-12 LAB
ALBUMIN SERPL BCP-MCNC: 4.2 G/DL (ref 3.5–5.2)
ALP SERPL-CCNC: 69 U/L (ref 55–135)
ALT SERPL W/O P-5'-P-CCNC: 22 U/L (ref 10–44)
ANION GAP SERPL CALC-SCNC: 8 MMOL/L (ref 8–16)
AST SERPL-CCNC: 23 U/L (ref 10–40)
BASOPHILS # BLD AUTO: 0.03 K/UL (ref 0–0.2)
BASOPHILS NFR BLD: 0.6 % (ref 0–1.9)
BILIRUB SERPL-MCNC: 0.3 MG/DL (ref 0.1–1)
BUN SERPL-MCNC: 15 MG/DL (ref 8–23)
CALCIUM SERPL-MCNC: 9.7 MG/DL (ref 8.7–10.5)
CHLORIDE SERPL-SCNC: 106 MMOL/L (ref 95–110)
CHOLEST SERPL-MCNC: 257 MG/DL (ref 120–199)
CHOLEST/HDLC SERPL: 4.7 {RATIO} (ref 2–5)
CO2 SERPL-SCNC: 26 MMOL/L (ref 23–29)
CREAT SERPL-MCNC: 0.8 MG/DL (ref 0.5–1.4)
DIFFERENTIAL METHOD: ABNORMAL
EOSINOPHIL # BLD AUTO: 0.1 K/UL (ref 0–0.5)
EOSINOPHIL NFR BLD: 1.4 % (ref 0–8)
ERYTHROCYTE [DISTWIDTH] IN BLOOD BY AUTOMATED COUNT: 12.4 % (ref 11.5–14.5)
EST. GFR  (NO RACE VARIABLE): >60 ML/MIN/1.73 M^2
ESTIMATED AVG GLUCOSE: 111 MG/DL (ref 68–131)
GLUCOSE SERPL-MCNC: 80 MG/DL (ref 70–110)
HBA1C MFR BLD: 5.5 % (ref 4–5.6)
HCT VFR BLD AUTO: 39.4 % (ref 37–48.5)
HDLC SERPL-MCNC: 55 MG/DL (ref 40–75)
HDLC SERPL: 21.4 % (ref 20–50)
HGB BLD-MCNC: 12.4 G/DL (ref 12–16)
IMM GRANULOCYTES # BLD AUTO: 0.01 K/UL (ref 0–0.04)
IMM GRANULOCYTES NFR BLD AUTO: 0.2 % (ref 0–0.5)
LDLC SERPL CALC-MCNC: 167.8 MG/DL (ref 63–159)
LYMPHOCYTES # BLD AUTO: 2.1 K/UL (ref 1–4.8)
LYMPHOCYTES NFR BLD: 43.3 % (ref 18–48)
MCH RBC QN AUTO: 30.8 PG (ref 27–31)
MCHC RBC AUTO-ENTMCNC: 31.5 G/DL (ref 32–36)
MCV RBC AUTO: 98 FL (ref 82–98)
MONOCYTES # BLD AUTO: 0.5 K/UL (ref 0.3–1)
MONOCYTES NFR BLD: 9.4 % (ref 4–15)
NEUTROPHILS # BLD AUTO: 2.2 K/UL (ref 1.8–7.7)
NEUTROPHILS NFR BLD: 45.1 % (ref 38–73)
NONHDLC SERPL-MCNC: 202 MG/DL
NRBC BLD-RTO: 0 /100 WBC
PLATELET # BLD AUTO: 397 K/UL (ref 150–450)
PMV BLD AUTO: 10.5 FL (ref 9.2–12.9)
POTASSIUM SERPL-SCNC: 4.6 MMOL/L (ref 3.5–5.1)
PROT SERPL-MCNC: 6.8 G/DL (ref 6–8.4)
RBC # BLD AUTO: 4.03 M/UL (ref 4–5.4)
SODIUM SERPL-SCNC: 140 MMOL/L (ref 136–145)
TRIGL SERPL-MCNC: 171 MG/DL (ref 30–150)
TSH SERPL DL<=0.005 MIU/L-ACNC: 0.76 UIU/ML (ref 0.4–4)
WBC # BLD AUTO: 4.9 K/UL (ref 3.9–12.7)

## 2023-05-12 PROCEDURE — 80061 LIPID PANEL: CPT | Performed by: FAMILY MEDICINE

## 2023-05-12 PROCEDURE — 84443 ASSAY THYROID STIM HORMONE: CPT | Performed by: FAMILY MEDICINE

## 2023-05-12 PROCEDURE — 80053 COMPREHEN METABOLIC PANEL: CPT | Performed by: FAMILY MEDICINE

## 2023-05-12 PROCEDURE — 83036 HEMOGLOBIN GLYCOSYLATED A1C: CPT | Performed by: FAMILY MEDICINE

## 2023-05-12 PROCEDURE — 85025 COMPLETE CBC W/AUTO DIFF WBC: CPT | Performed by: FAMILY MEDICINE

## 2023-05-12 PROCEDURE — 36415 COLL VENOUS BLD VENIPUNCTURE: CPT | Mod: PO | Performed by: FAMILY MEDICINE

## 2023-05-17 ENCOUNTER — PES CALL (OUTPATIENT)
Dept: ADMINISTRATIVE | Facility: CLINIC | Age: 73
End: 2023-05-17
Payer: MEDICARE

## 2023-06-06 ENCOUNTER — OFFICE VISIT (OUTPATIENT)
Dept: OPTOMETRY | Facility: CLINIC | Age: 73
End: 2023-06-06
Payer: MEDICARE

## 2023-06-06 DIAGNOSIS — H52.4 MYOPIA WITH PRESBYOPIA OF BOTH EYES: ICD-10-CM

## 2023-06-06 DIAGNOSIS — H02.88B MEIBOMIAN GLAND DYSFUNCTION (MGD), BILATERAL, BOTH UPPER AND LOWER LIDS: ICD-10-CM

## 2023-06-06 DIAGNOSIS — H52.13 MYOPIA WITH PRESBYOPIA OF BOTH EYES: ICD-10-CM

## 2023-06-06 DIAGNOSIS — H25.13 NUCLEAR SCLEROSIS, BILATERAL: Primary | ICD-10-CM

## 2023-06-06 DIAGNOSIS — H02.88A MEIBOMIAN GLAND DYSFUNCTION (MGD), BILATERAL, BOTH UPPER AND LOWER LIDS: ICD-10-CM

## 2023-06-06 DIAGNOSIS — Z13.5 GLAUCOMA SCREENING: ICD-10-CM

## 2023-06-06 PROCEDURE — 3288F PR FALLS RISK ASSESSMENT DOCUMENTED: ICD-10-PCS | Mod: CPTII,S$GLB,, | Performed by: OPTOMETRIST

## 2023-06-06 PROCEDURE — 1101F PT FALLS ASSESS-DOCD LE1/YR: CPT | Mod: CPTII,S$GLB,, | Performed by: OPTOMETRIST

## 2023-06-06 PROCEDURE — 92004 COMPRE OPH EXAM NEW PT 1/>: CPT | Mod: S$GLB,,, | Performed by: OPTOMETRIST

## 2023-06-06 PROCEDURE — 1126F PR PAIN SEVERITY QUANTIFIED, NO PAIN PRESENT: ICD-10-PCS | Mod: CPTII,S$GLB,, | Performed by: OPTOMETRIST

## 2023-06-06 PROCEDURE — 92015 PR REFRACTION: ICD-10-PCS | Mod: S$GLB,,, | Performed by: OPTOMETRIST

## 2023-06-06 PROCEDURE — 92004 PR EYE EXAM, NEW PATIENT,COMPREHESV: ICD-10-PCS | Mod: S$GLB,,, | Performed by: OPTOMETRIST

## 2023-06-06 PROCEDURE — 99999 PR PBB SHADOW E&M-EST. PATIENT-LVL II: CPT | Mod: PBBFAC,,, | Performed by: OPTOMETRIST

## 2023-06-06 PROCEDURE — 1126F AMNT PAIN NOTED NONE PRSNT: CPT | Mod: CPTII,S$GLB,, | Performed by: OPTOMETRIST

## 2023-06-06 PROCEDURE — 3044F HG A1C LEVEL LT 7.0%: CPT | Mod: CPTII,S$GLB,, | Performed by: OPTOMETRIST

## 2023-06-06 PROCEDURE — 1159F MED LIST DOCD IN RCRD: CPT | Mod: CPTII,S$GLB,, | Performed by: OPTOMETRIST

## 2023-06-06 PROCEDURE — 1159F PR MEDICATION LIST DOCUMENTED IN MEDICAL RECORD: ICD-10-PCS | Mod: CPTII,S$GLB,, | Performed by: OPTOMETRIST

## 2023-06-06 PROCEDURE — 99999 PR PBB SHADOW E&M-EST. PATIENT-LVL II: ICD-10-PCS | Mod: PBBFAC,,, | Performed by: OPTOMETRIST

## 2023-06-06 PROCEDURE — 92015 DETERMINE REFRACTIVE STATE: CPT | Mod: S$GLB,,, | Performed by: OPTOMETRIST

## 2023-06-06 PROCEDURE — 1101F PR PT FALLS ASSESS DOC 0-1 FALLS W/OUT INJ PAST YR: ICD-10-PCS | Mod: CPTII,S$GLB,, | Performed by: OPTOMETRIST

## 2023-06-06 PROCEDURE — 3288F FALL RISK ASSESSMENT DOCD: CPT | Mod: CPTII,S$GLB,, | Performed by: OPTOMETRIST

## 2023-06-06 PROCEDURE — 3044F PR MOST RECENT HEMOGLOBIN A1C LEVEL <7.0%: ICD-10-PCS | Mod: CPTII,S$GLB,, | Performed by: OPTOMETRIST

## 2023-06-06 NOTE — PROGRESS NOTES
HPI    Annual Exam   Pt states Blurred Vision c MRX   MRX +3 years     Pt denies F/F   Pt denies Dry/ Itchy/ Burning  Gtt: as need systane   Pt feels relief   Last edited by Leni Stout on 6/6/2023 11:45 AM.            Assessment /Plan     For exam results, see Encounter Report.    Nuclear sclerosis, bilateral  -Educated patient on presence of cataracts at today's exam, monitor at annual dilated fundus exam. 5+ years surgical estimate.    Meibomian gland dysfunction (MGD), bilateral, both upper and lower lids  -Systane PRN    Glaucoma screening  -Monitor with annual eye exam and IOP check    Myopia with presbyopia of both eyes  Eyeglass Final Rx       Eyeglass Final Rx         Sphere Cylinder Axis Dist VA Add    Right -1.75 +0.50 030 20/25 +2.50    Left -2.75 Sphere  20/25 +2.50      Type: PAL    Expiration Date: 6/6/2024                      RTC 1 yr

## 2023-08-07 ENCOUNTER — OFFICE VISIT (OUTPATIENT)
Dept: FAMILY MEDICINE | Facility: CLINIC | Age: 73
End: 2023-08-07
Payer: MEDICARE

## 2023-08-07 VITALS
WEIGHT: 150.44 LBS | BODY MASS INDEX: 27.68 KG/M2 | SYSTOLIC BLOOD PRESSURE: 126 MMHG | HEART RATE: 72 BPM | OXYGEN SATURATION: 97 % | HEIGHT: 62 IN | TEMPERATURE: 98 F | DIASTOLIC BLOOD PRESSURE: 72 MMHG

## 2023-08-07 DIAGNOSIS — E05.90 HYPERTHYROIDISM: ICD-10-CM

## 2023-08-07 DIAGNOSIS — E78.49 OTHER HYPERLIPIDEMIA: ICD-10-CM

## 2023-08-07 DIAGNOSIS — N81.10 BLADDER PROLAPSE, FEMALE, ACQUIRED: ICD-10-CM

## 2023-08-07 DIAGNOSIS — Z00.00 ENCOUNTER FOR PREVENTIVE HEALTH EXAMINATION: Primary | ICD-10-CM

## 2023-08-07 DIAGNOSIS — K21.00 GASTROESOPHAGEAL REFLUX DISEASE WITH ESOPHAGITIS, UNSPECIFIED WHETHER HEMORRHAGE: ICD-10-CM

## 2023-08-07 PROCEDURE — 1101F PT FALLS ASSESS-DOCD LE1/YR: CPT | Mod: CPTII,S$GLB,, | Performed by: NURSE PRACTITIONER

## 2023-08-07 PROCEDURE — 3008F PR BODY MASS INDEX (BMI) DOCUMENTED: ICD-10-PCS | Mod: CPTII,S$GLB,, | Performed by: NURSE PRACTITIONER

## 2023-08-07 PROCEDURE — 3078F DIAST BP <80 MM HG: CPT | Mod: CPTII,S$GLB,, | Performed by: NURSE PRACTITIONER

## 2023-08-07 PROCEDURE — 3008F BODY MASS INDEX DOCD: CPT | Mod: CPTII,S$GLB,, | Performed by: NURSE PRACTITIONER

## 2023-08-07 PROCEDURE — 3288F PR FALLS RISK ASSESSMENT DOCUMENTED: ICD-10-PCS | Mod: CPTII,S$GLB,, | Performed by: NURSE PRACTITIONER

## 2023-08-07 PROCEDURE — 99999 PR PBB SHADOW E&M-EST. PATIENT-LVL IV: ICD-10-PCS | Mod: PBBFAC,,, | Performed by: NURSE PRACTITIONER

## 2023-08-07 PROCEDURE — 1160F RVW MEDS BY RX/DR IN RCRD: CPT | Mod: CPTII,S$GLB,, | Performed by: NURSE PRACTITIONER

## 2023-08-07 PROCEDURE — 3078F PR MOST RECENT DIASTOLIC BLOOD PRESSURE < 80 MM HG: ICD-10-PCS | Mod: CPTII,S$GLB,, | Performed by: NURSE PRACTITIONER

## 2023-08-07 PROCEDURE — 3044F PR MOST RECENT HEMOGLOBIN A1C LEVEL <7.0%: ICD-10-PCS | Mod: CPTII,S$GLB,, | Performed by: NURSE PRACTITIONER

## 2023-08-07 PROCEDURE — 99999 PR PBB SHADOW E&M-EST. PATIENT-LVL IV: CPT | Mod: PBBFAC,,, | Performed by: NURSE PRACTITIONER

## 2023-08-07 PROCEDURE — 1159F PR MEDICATION LIST DOCUMENTED IN MEDICAL RECORD: ICD-10-PCS | Mod: CPTII,S$GLB,, | Performed by: NURSE PRACTITIONER

## 2023-08-07 PROCEDURE — 1160F PR REVIEW ALL MEDS BY PRESCRIBER/CLIN PHARMACIST DOCUMENTED: ICD-10-PCS | Mod: CPTII,S$GLB,, | Performed by: NURSE PRACTITIONER

## 2023-08-07 PROCEDURE — 3288F FALL RISK ASSESSMENT DOCD: CPT | Mod: CPTII,S$GLB,, | Performed by: NURSE PRACTITIONER

## 2023-08-07 PROCEDURE — 3074F SYST BP LT 130 MM HG: CPT | Mod: CPTII,S$GLB,, | Performed by: NURSE PRACTITIONER

## 2023-08-07 PROCEDURE — 1170F FXNL STATUS ASSESSED: CPT | Mod: CPTII,S$GLB,, | Performed by: NURSE PRACTITIONER

## 2023-08-07 PROCEDURE — 1159F MED LIST DOCD IN RCRD: CPT | Mod: CPTII,S$GLB,, | Performed by: NURSE PRACTITIONER

## 2023-08-07 PROCEDURE — 3044F HG A1C LEVEL LT 7.0%: CPT | Mod: CPTII,S$GLB,, | Performed by: NURSE PRACTITIONER

## 2023-08-07 PROCEDURE — 1101F PR PT FALLS ASSESS DOC 0-1 FALLS W/OUT INJ PAST YR: ICD-10-PCS | Mod: CPTII,S$GLB,, | Performed by: NURSE PRACTITIONER

## 2023-08-07 PROCEDURE — G0439 PR MEDICARE ANNUAL WELLNESS SUBSEQUENT VISIT: ICD-10-PCS | Mod: S$GLB,,, | Performed by: NURSE PRACTITIONER

## 2023-08-07 PROCEDURE — 3074F PR MOST RECENT SYSTOLIC BLOOD PRESSURE < 130 MM HG: ICD-10-PCS | Mod: CPTII,S$GLB,, | Performed by: NURSE PRACTITIONER

## 2023-08-07 PROCEDURE — G0439 PPPS, SUBSEQ VISIT: HCPCS | Mod: S$GLB,,, | Performed by: NURSE PRACTITIONER

## 2023-08-07 PROCEDURE — 1170F PR FUNCTIONAL STATUS ASSESSED: ICD-10-PCS | Mod: CPTII,S$GLB,, | Performed by: NURSE PRACTITIONER

## 2023-08-07 RX ORDER — CALC/MAG/B COMPLEX/D3/HERB 61
15 TABLET ORAL
COMMUNITY
End: 2023-10-10 | Stop reason: SDUPTHER

## 2023-08-07 NOTE — PROGRESS NOTES
"  Meera Britt presented for a  Medicare AWV and comprehensive Health Risk Assessment today. The following components were reviewed and updated:    Medical history  Family History  Social history  Allergies and Current Medications  Health Risk Assessment  Health Maintenance  Care Team       ** See Completed Assessments for Annual Wellness Visit within the encounter summary.**       The following assessments were completed:  Living Situation  CAGE  Depression Screening  Timed Get Up and Go  Whisper Test  Cognitive Function Screening  Nutrition Screening  ADL Screening  PAQ Screening          Vitals:    08/07/23 0951   BP: 126/72   BP Location: Left arm   Patient Position: Sitting   BP Method: Large (Manual)   Pulse: 72   Temp: 98.2 °F (36.8 °C)   TempSrc: Oral   SpO2: 97%   Weight: 68.2 kg (150 lb 7.4 oz)   Height: 5' 2" (1.575 m)     Body mass index is 27.52 kg/m².  Physical Exam  Vitals and nursing note reviewed.   Constitutional:       Appearance: Normal appearance.   Cardiovascular:      Rate and Rhythm: Normal rate.      Pulses: Normal pulses.      Heart sounds: Normal heart sounds.   Pulmonary:      Effort: Pulmonary effort is normal.      Breath sounds: Normal breath sounds.   Musculoskeletal:         General: Normal range of motion.   Neurological:      Mental Status: She is alert and oriented to person, place, and time.   Psychiatric:         Mood and Affect: Mood normal.         Behavior: Behavior normal.             Diagnoses and health risks identified today and associated recommendations/orders:    1. Encounter for preventive health examination  Pt was seen today for an Annual Wellness visit. Healthcare maintenance and screening recommendations were discussed and updated as indicated. Return in one year for AWV.    Review current opioid prescriptions:n/a  Screen for potential Substance Use Disorders:n/a    2. Hyperthyroidism  The current medical regimen is effective;  continue present plan and " medications.    3. Other hyperlipidemia  The current medical regimen is effective;  continue present plan and medications.    4. Gastroesophageal reflux disease with esophagitis, unspecified whether hemorrhage  The current medical regimen is effective;  continue present plan and medications.    5. Bladder prolapse, female, acquired  The current medical regimen is effective;  continue present plan and medications.        Provided Meera with a 5-10 year written screening schedule and personal prevention plan. Recommendations were developed using the USPSTF age appropriate recommendations. Education, counseling, and referrals were provided as needed. After Visit Summary printed and given to patient which includes a list of additional screenings\tests needed.    Follow up in about 1 year (around 8/7/2024).    REYNA Burns    I offered to discuss advanced care planning, including how to pick a person who would make decisions for you if you were unable to make them for yourself, called a health care power of , and what kind of decisions you might make such as use of life sustaining treatments such as ventilators and tube feeding when faced with a life limiting illness recorded on a living will that they will need to know. (How you want to be cared for as you near the end of your natural life)     X Patient is interested in learning more about how to make advanced directives.  I provided them paperwork and offered to discuss this with them.

## 2023-08-07 NOTE — PATIENT INSTRUCTIONS
Counseling and Referral of Other Preventative  (Italic type indicates deductible and co-insurance are waived)    Patient Name: Meera Britt  Today's Date: 8/7/2023    Health Maintenance       Date Due Completion Date    Colorectal Cancer Screening 10/26/2022 10/26/2017    COVID-19 Vaccine (4 - Mixed Product risk series) 05/10/2023 3/15/2023    Mammogram 08/22/2023 8/22/2022    Influenza Vaccine (1) 09/01/2023 2/12/2021    Hemoglobin A1c (Prediabetes) 05/12/2024 5/12/2023    DEXA Scan 08/22/2026 8/22/2022    Lipid Panel 05/12/2028 5/12/2023    TETANUS VACCINE 09/22/2031 9/22/2021        No orders of the defined types were placed in this encounter.    The following information is provided to all patients.  This information is to help you find resources for any of the problems found today that may be affecting your health:                Living healthy guide: www.[facility].[place].[facility]      Understanding Diabetes: www.diabetes.org      Eating healthy: www.cdc.gov/healthyweight      [facility] home safety checklist: www.cdc.gov/steadi/patient.html      Agency on Aging: www.goea.[place].gov      Alcoholics anonymous (AA): www.aa.org      Physical Activity: www.floyd.nih.gov/lb2ivvx      Tobacco use: www.quitwithusla.org

## 2023-09-06 DIAGNOSIS — E78.00 PURE HYPERCHOLESTEROLEMIA: ICD-10-CM

## 2023-09-06 RX ORDER — FENOFIBRATE 160 MG/1
160 TABLET ORAL
Qty: 90 TABLET | Refills: 0 | Status: SHIPPED | OUTPATIENT
Start: 2023-09-06 | End: 2023-12-27

## 2023-09-06 NOTE — TELEPHONE ENCOUNTER
No care due was identified.  Health [facility] Embedded Care Due Messages. Reference number: [id number].   9/06/2023 9:20:12 AM CDT

## 2023-09-06 NOTE — TELEPHONE ENCOUNTER
Refill Decision Note   Meera Britt  is requesting a refill authorization.  Brief Assessment and Rationale for Refill:  Approve     Medication Therapy Plan:         Comments:     Note composed:1:36 PM 09/06/2023             Appointments     Last Visit   8/2/2022 Loretta Rg MD   Next Visit   10/10/2023 Loretta Rg MD

## 2023-10-10 ENCOUNTER — OFFICE VISIT (OUTPATIENT)
Dept: FAMILY MEDICINE | Facility: CLINIC | Age: 73
End: 2023-10-10
Payer: MEDICARE

## 2023-10-10 VITALS
WEIGHT: 146.19 LBS | TEMPERATURE: 98 F | HEIGHT: 62 IN | OXYGEN SATURATION: 97 % | BODY MASS INDEX: 26.9 KG/M2 | DIASTOLIC BLOOD PRESSURE: 70 MMHG | HEART RATE: 70 BPM | SYSTOLIC BLOOD PRESSURE: 128 MMHG

## 2023-10-10 DIAGNOSIS — Z23 FLU VACCINE NEED: ICD-10-CM

## 2023-10-10 DIAGNOSIS — K21.9 GERD WITHOUT ESOPHAGITIS: ICD-10-CM

## 2023-10-10 DIAGNOSIS — E78.49 OTHER HYPERLIPIDEMIA: ICD-10-CM

## 2023-10-10 DIAGNOSIS — Z12.11 ENCOUNTER FOR SCREENING FOR COLORECTAL MALIGNANT NEOPLASM: ICD-10-CM

## 2023-10-10 DIAGNOSIS — Z12.12 ENCOUNTER FOR SCREENING FOR COLORECTAL MALIGNANT NEOPLASM: ICD-10-CM

## 2023-10-10 DIAGNOSIS — Z12.31 SCREENING MAMMOGRAM FOR BREAST CANCER: ICD-10-CM

## 2023-10-10 DIAGNOSIS — Z00.00 ANNUAL PHYSICAL EXAM: Primary | ICD-10-CM

## 2023-10-10 PROCEDURE — 99999 PR PBB SHADOW E&M-EST. PATIENT-LVL IV: ICD-10-PCS | Mod: PBBFAC,,, | Performed by: FAMILY MEDICINE

## 2023-10-10 PROCEDURE — 99999 PR PBB SHADOW E&M-EST. PATIENT-LVL IV: CPT | Mod: PBBFAC,,, | Performed by: FAMILY MEDICINE

## 2023-10-10 PROCEDURE — 3074F PR MOST RECENT SYSTOLIC BLOOD PRESSURE < 130 MM HG: ICD-10-PCS | Mod: CPTII,S$GLB,, | Performed by: FAMILY MEDICINE

## 2023-10-10 PROCEDURE — 3008F BODY MASS INDEX DOCD: CPT | Mod: CPTII,S$GLB,, | Performed by: FAMILY MEDICINE

## 2023-10-10 PROCEDURE — G0008 ADMIN INFLUENZA VIRUS VAC: HCPCS | Mod: S$GLB,,, | Performed by: FAMILY MEDICINE

## 2023-10-10 PROCEDURE — 99397 PER PM REEVAL EST PAT 65+ YR: CPT | Mod: 25,GZ,S$GLB, | Performed by: FAMILY MEDICINE

## 2023-10-10 PROCEDURE — 1159F PR MEDICATION LIST DOCUMENTED IN MEDICAL RECORD: ICD-10-PCS | Mod: CPTII,S$GLB,, | Performed by: FAMILY MEDICINE

## 2023-10-10 PROCEDURE — G0008 FLU VACCINE - QUADRIVALENT - ADJUVANTED: ICD-10-PCS | Mod: S$GLB,,, | Performed by: FAMILY MEDICINE

## 2023-10-10 PROCEDURE — 99397 PR PREVENTIVE VISIT,EST,65 & OVER: ICD-10-PCS | Mod: 25,GZ,S$GLB, | Performed by: FAMILY MEDICINE

## 2023-10-10 PROCEDURE — 90694 FLU VACCINE - QUADRIVALENT - ADJUVANTED: ICD-10-PCS | Mod: S$GLB,,, | Performed by: FAMILY MEDICINE

## 2023-10-10 PROCEDURE — 1101F PR PT FALLS ASSESS DOC 0-1 FALLS W/OUT INJ PAST YR: ICD-10-PCS | Mod: CPTII,S$GLB,, | Performed by: FAMILY MEDICINE

## 2023-10-10 PROCEDURE — 1159F MED LIST DOCD IN RCRD: CPT | Mod: CPTII,S$GLB,, | Performed by: FAMILY MEDICINE

## 2023-10-10 PROCEDURE — 3288F FALL RISK ASSESSMENT DOCD: CPT | Mod: CPTII,S$GLB,, | Performed by: FAMILY MEDICINE

## 2023-10-10 PROCEDURE — 1101F PT FALLS ASSESS-DOCD LE1/YR: CPT | Mod: CPTII,S$GLB,, | Performed by: FAMILY MEDICINE

## 2023-10-10 PROCEDURE — 3078F PR MOST RECENT DIASTOLIC BLOOD PRESSURE < 80 MM HG: ICD-10-PCS | Mod: CPTII,S$GLB,, | Performed by: FAMILY MEDICINE

## 2023-10-10 PROCEDURE — 3044F PR MOST RECENT HEMOGLOBIN A1C LEVEL <7.0%: ICD-10-PCS | Mod: CPTII,S$GLB,, | Performed by: FAMILY MEDICINE

## 2023-10-10 PROCEDURE — 3044F HG A1C LEVEL LT 7.0%: CPT | Mod: CPTII,S$GLB,, | Performed by: FAMILY MEDICINE

## 2023-10-10 PROCEDURE — 3074F SYST BP LT 130 MM HG: CPT | Mod: CPTII,S$GLB,, | Performed by: FAMILY MEDICINE

## 2023-10-10 PROCEDURE — 3008F PR BODY MASS INDEX (BMI) DOCUMENTED: ICD-10-PCS | Mod: CPTII,S$GLB,, | Performed by: FAMILY MEDICINE

## 2023-10-10 PROCEDURE — 1160F PR REVIEW ALL MEDS BY PRESCRIBER/CLIN PHARMACIST DOCUMENTED: ICD-10-PCS | Mod: CPTII,S$GLB,, | Performed by: FAMILY MEDICINE

## 2023-10-10 PROCEDURE — 90694 VACC AIIV4 NO PRSRV 0.5ML IM: CPT | Mod: S$GLB,,, | Performed by: FAMILY MEDICINE

## 2023-10-10 PROCEDURE — 3288F PR FALLS RISK ASSESSMENT DOCUMENTED: ICD-10-PCS | Mod: CPTII,S$GLB,, | Performed by: FAMILY MEDICINE

## 2023-10-10 PROCEDURE — 1126F AMNT PAIN NOTED NONE PRSNT: CPT | Mod: CPTII,S$GLB,, | Performed by: FAMILY MEDICINE

## 2023-10-10 PROCEDURE — 3078F DIAST BP <80 MM HG: CPT | Mod: CPTII,S$GLB,, | Performed by: FAMILY MEDICINE

## 2023-10-10 PROCEDURE — 1160F RVW MEDS BY RX/DR IN RCRD: CPT | Mod: CPTII,S$GLB,, | Performed by: FAMILY MEDICINE

## 2023-10-10 PROCEDURE — 1126F PR PAIN SEVERITY QUANTIFIED, NO PAIN PRESENT: ICD-10-PCS | Mod: CPTII,S$GLB,, | Performed by: FAMILY MEDICINE

## 2023-10-10 RX ORDER — ROSUVASTATIN CALCIUM 5 MG/1
5 TABLET, COATED ORAL DAILY
Qty: 90 TABLET | Refills: 3 | Status: SHIPPED | OUTPATIENT
Start: 2023-10-10 | End: 2024-10-09

## 2023-10-10 RX ORDER — CALC/MAG/B COMPLEX/D3/HERB 61
15 TABLET ORAL DAILY
Qty: 90 CAPSULE | Refills: 3 | Status: SHIPPED | OUTPATIENT
Start: 2023-10-10

## 2023-10-10 RX ORDER — PANTOPRAZOLE SODIUM 20 MG/1
20 TABLET, DELAYED RELEASE ORAL DAILY
Qty: 90 TABLET | Refills: 3 | Status: SHIPPED | OUTPATIENT
Start: 2023-10-10

## 2023-10-10 NOTE — PROGRESS NOTES
Assessment & Plan  Problem List Items Addressed This Visit          Cardiac/Vascular    Other hyperlipidemia    Relevant Medications    rosuvastatin (CRESTOR) 5 MG tablet     Other Visit Diagnoses       Annual physical exam    -  Primary    Flu vaccine need        Relevant Orders    Influenza - Quadrivalent (Adjuvanted) (Completed)    Screening mammogram for breast cancer        Relevant Orders    Mammo Digital Screening Bilat    GERD without esophagitis        Relevant Medications    pantoprazole (PROTONIX) 20 MG tablet    lansoprazole (PREVACID) 15 MG capsule    Encounter for screening for colorectal malignant neoplasm        Relevant Orders    Ambulatory referral/consult to Endo Procedure         I addressed all major concerns as it related to health maintenance.  All were ordered and scheduled based on the patients wishes.  Any additional health maintenance will be readdressed at the next physical if declined or deferred by the patient.        Health Maintenance reviewed.    Follow-up: No follow-ups on file.    ______________________________________________________________________    Chief Complaint  Chief Complaint   Patient presents with    Annual Exam       HPI  Meera Britt is a 73 y.o. female with multiple medical diagnoses as listed in the medical history and problem list that presents for annual exam.  Pt is known to me with last appointment 8/2/2022.    Patient denies any new symptoms including chest pain, SOB, blurry vision, N/V, diarrhea.        PAST MEDICAL HISTORY:  Past Medical History:   Diagnosis Date    Allergy     Arthritis     Endometrial cancer 1980    GERD (gastroesophageal reflux disease)     Hyperlipidemia     Thyroid disease        PAST SURGICAL HISTORY:  Past Surgical History:   Procedure Laterality Date    COLONOSCOPY N/A 10/26/2017    Procedure: COLONOSCOPY;  Surgeon: Heladio Gonzalez MD;  Location: [facility];  Service: Endoscopy;  Laterality: N/A;    HYSTERECTOMY  1980     OOPHORECTOMY      TONSILLECTOMY         SOCIAL HISTORY:  Social History     Socioeconomic History    Marital status:    Tobacco Use    Smoking status: Never     Passive exposure: Never    Smokeless tobacco: Never   Substance and Sexual Activity    Alcohol use: No    Drug use: Never    Sexual activity: Not Currently       FAMILY HISTORY:  History reviewed. No pertinent family history.    ALLERGIES AND MEDICATIONS: updated and reviewed.  Review of patient's allergies indicates:   Allergen Reactions    Iodine and iodide containing products      bloating    Sulfa (sulfonamide antibiotics) Nausea And Vomiting     Current Outpatient Medications   Medication Sig Dispense Refill    fenofibrate 160 MG Tab Take 1 tablet by mouth once daily 90 tablet 0    fexofenadine/pseudoephedrine (ALLERGY RELIEF D ORAL) Take by mouth.      methIMAzole (TAPAZOLE) 5 MG Tab Take 1 tablet by mouth once daily 90 tablet 3    brompheniramine-pseudoeph-DM (BROMFED DM) 2-30-10 mg/5 mL Syrp Take 10 mLs by mouth every 4 (four) hours as needed.      flaxseed oil Oil 1,200 mg by Misc.(Non-Drug; Combo Route) route 2 (two) times daily.      lansoprazole (PREVACID) 15 MG capsule Take 1 capsule (15 mg total) by mouth once daily. 90 capsule 3    magnesium oxide 400 mg Cap Take 1 capsule by mouth once.      omega-3 fatty acids/fish oil (FISH OIL-OMEGA-3 FATTY ACIDS) 300-1,000 mg capsule Take by mouth once daily.      pantoprazole (PROTONIX) 20 MG tablet Take 1 tablet (20 mg total) by mouth once daily. 90 tablet 3    rosuvastatin (CRESTOR) 5 MG tablet Take 1 tablet (5 mg total) by mouth once daily. 90 tablet 3     Current Facility-Administered Medications   Medication Dose Route Frequency Provider Last Rate Last Admin    dexamethasone injection 8 mg  8 mg Intramuscular 1 time in Clinic/HOD Laina Erwin PA-C ROS  Review of Systems   Constitutional:  Negative for activity change, appetite change, fatigue, fever and unexpected weight  "change.   HENT: Negative.  Negative for ear discharge, ear pain, rhinorrhea and sore throat.    Eyes: Negative.    Respiratory:  Negative for apnea, cough, chest tightness, shortness of breath and wheezing.    Cardiovascular:  Negative for chest pain, palpitations and leg swelling.   Gastrointestinal:  Negative for abdominal distention, abdominal pain, constipation, diarrhea and vomiting.   Endocrine: Negative for cold intolerance, heat intolerance, polydipsia and polyuria.   Genitourinary:  Negative for decreased urine volume and urgency.   Musculoskeletal: Negative.    Skin:  Negative for rash.   Neurological:  Negative for dizziness and headaches.   Hematological:  Does not bruise/bleed easily.   Psychiatric/Behavioral:  Negative for agitation, sleep disturbance and suicidal ideas.            Physical Exam  Vitals:    10/10/23 0822   BP: 128/70   Pulse: 70   Temp: 98 °F (36.7 °C)   TempSrc: Oral   SpO2: 97%   Weight: 66.3 kg (146 lb 2.6 oz)   Height: 5' 2" (1.575 m)    Body mass index is 26.73 kg/m².  Weight: 66.3 kg (146 lb 2.6 oz)   Height: 5' 2" (157.5 cm)   Physical Exam  Vitals reviewed.   Constitutional:       Appearance: Normal appearance. She is well-developed.   HENT:      Head: Normocephalic and atraumatic.      Right Ear: External ear normal.      Left Ear: External ear normal.      Nose: Nose normal.      Mouth/Throat:      Mouth: Mucous membranes are moist.      Pharynx: Oropharynx is clear.   Eyes:      Extraocular Movements: Extraocular movements intact.      Conjunctiva/sclera: Conjunctivae normal.      Pupils: Pupils are equal, round, and reactive to light.   Cardiovascular:      Rate and Rhythm: Normal rate and regular rhythm.      Heart sounds: Normal heart sounds.   Pulmonary:      Effort: Pulmonary effort is normal.      Breath sounds: Normal breath sounds.   Skin:     General: Skin is warm and dry.   Neurological:      Mental Status: She is alert and oriented to person, place, and time. "           Health Maintenance         Date Due Completion Date    Colorectal Cancer Screening 10/26/2022 10/26/2017    Mammogram 08/22/2023 8/22/2022    COVID-19 Vaccine (4 - 2023-24 season) 09/01/2023 3/15/2023    Hemoglobin A1c (Prediabetes) 05/12/2024 5/12/2023    DEXA Scan 08/22/2026 8/22/2022    Lipid Panel 05/12/2028 5/12/2023    TETANUS VACCINE 09/22/2031 9/22/2021                Patient note was created using [employer].  Any errors in syntax or even information may not have been identified and edited on initial review prior to signing this note.

## 2023-10-18 ENCOUNTER — HOSPITAL ENCOUNTER (OUTPATIENT)
Dept: RADIOLOGY | Facility: HOSPITAL | Age: 73
Discharge: HOME | End: 2023-10-18
Attending: FAMILY MEDICINE
Payer: MEDICARE

## 2023-10-18 DIAGNOSIS — Z12.31 SCREENING MAMMOGRAM FOR BREAST CANCER: ICD-10-CM

## 2023-10-18 PROCEDURE — 77067 SCR MAMMO BI INCL CAD: CPT | Mod: 26,,, | Performed by: RADIOLOGY

## 2023-10-18 PROCEDURE — 77063 BREAST TOMOSYNTHESIS BI: CPT | Mod: 26,,, | Performed by: RADIOLOGY

## 2023-10-18 PROCEDURE — 77067 MAMMO DIGITAL SCREENING BILAT WITH TOMO: ICD-10-PCS | Mod: 26,,, | Performed by: RADIOLOGY

## 2023-10-18 PROCEDURE — 77063 MAMMO DIGITAL SCREENING BILAT WITH TOMO: ICD-10-PCS | Mod: 26,,, | Performed by: RADIOLOGY

## 2023-10-18 PROCEDURE — 77067 SCR MAMMO BI INCL CAD: CPT | Mod: TC,PO

## 2023-12-27 DIAGNOSIS — E78.00 PURE HYPERCHOLESTEROLEMIA: ICD-10-CM

## 2023-12-27 RX ORDER — FENOFIBRATE 160 MG/1
160 TABLET ORAL
Qty: 90 TABLET | Refills: 1 | Status: SHIPPED | OUTPATIENT
Start: 2023-12-27

## 2023-12-27 NOTE — TELEPHONE ENCOUNTER
No care due was identified.  Health [facility] Embedded Care Due Messages. Reference number: [id number].   12/27/2023 1:40:54 PM CST

## 2023-12-28 NOTE — TELEPHONE ENCOUNTER
Refill Decision Note   Meera Farrde  is requesting a refill authorization.  Brief Assessment and Rationale for Refill:  Approve     Medication Therapy Plan:         Comments:     Note composed:10:02 PM 12/27/2023

## 2024-03-07 ENCOUNTER — OFFICE VISIT (OUTPATIENT)
Dept: FAMILY MEDICINE | Facility: CLINIC | Age: 74
End: 2024-03-07
Payer: MEDICARE

## 2024-03-07 VITALS
BODY MASS INDEX: 26.91 KG/M2 | SYSTOLIC BLOOD PRESSURE: 120 MMHG | TEMPERATURE: 98 F | WEIGHT: 146.25 LBS | HEART RATE: 71 BPM | HEIGHT: 62 IN | DIASTOLIC BLOOD PRESSURE: 72 MMHG | OXYGEN SATURATION: 97 %

## 2024-03-07 DIAGNOSIS — E05.90 HYPERTHYROIDISM: ICD-10-CM

## 2024-03-07 DIAGNOSIS — E55.9 VITAMIN D DEFICIENCY: ICD-10-CM

## 2024-03-07 DIAGNOSIS — J30.2 SEASONAL ALLERGIES: ICD-10-CM

## 2024-03-07 DIAGNOSIS — Z00.00 ENCOUNTER FOR PREVENTIVE HEALTH EXAMINATION: Primary | ICD-10-CM

## 2024-03-07 DIAGNOSIS — E78.49 OTHER HYPERLIPIDEMIA: ICD-10-CM

## 2024-03-07 DIAGNOSIS — Z71.89 ADVANCED DIRECTIVES, COUNSELING/DISCUSSION: ICD-10-CM

## 2024-03-07 DIAGNOSIS — K21.00 GASTROESOPHAGEAL REFLUX DISEASE WITH ESOPHAGITIS, UNSPECIFIED WHETHER HEMORRHAGE: ICD-10-CM

## 2024-03-07 PROCEDURE — 3074F SYST BP LT 130 MM HG: CPT | Mod: CPTII,S$GLB,, | Performed by: NURSE PRACTITIONER

## 2024-03-07 PROCEDURE — 99999 PR PBB SHADOW E&M-EST. PATIENT-LVL IV: CPT | Mod: PBBFAC,,, | Performed by: NURSE PRACTITIONER

## 2024-03-07 PROCEDURE — 1101F PT FALLS ASSESS-DOCD LE1/YR: CPT | Mod: CPTII,S$GLB,, | Performed by: NURSE PRACTITIONER

## 2024-03-07 PROCEDURE — 3288F FALL RISK ASSESSMENT DOCD: CPT | Mod: CPTII,S$GLB,, | Performed by: NURSE PRACTITIONER

## 2024-03-07 PROCEDURE — 1170F FXNL STATUS ASSESSED: CPT | Mod: CPTII,S$GLB,, | Performed by: NURSE PRACTITIONER

## 2024-03-07 PROCEDURE — 1159F MED LIST DOCD IN RCRD: CPT | Mod: CPTII,S$GLB,, | Performed by: NURSE PRACTITIONER

## 2024-03-07 PROCEDURE — G0439 PPPS, SUBSEQ VISIT: HCPCS | Mod: S$GLB,,, | Performed by: NURSE PRACTITIONER

## 2024-03-07 PROCEDURE — 3078F DIAST BP <80 MM HG: CPT | Mod: CPTII,S$GLB,, | Performed by: NURSE PRACTITIONER

## 2024-03-07 NOTE — PROGRESS NOTES
HPI     Chief Complaint:  AWV    Meera Britt is a 73 y.o. female with multiple medical diagnoses as listed in the medical history and problem list that presented for a Medicare AWV and comprehensive Health Risk Assessment today.  Pt is new to me but is known to this clinic with her last appointment being 10/10/2023.      The following components were reviewed and updated:    Medical history  Family History  Social history  Allergies and Current Medications  Health Risk Assessment  Health Maintenance  Care Team         Assessment & Plan   (all problems are new to me)    Diagnoses and health risks identified today and associated recommendations/orders:    Problem List Items Addressed This Visit          ENT    Seasonal allergies    The current medical regimen is effective;  continue present plan         Cardiac/Vascular    Other hyperlipidemia    discussed ways to lower triglycerides such as cutting simple sugars out of diet (white breads, candies, cookies, cakes, etc.) and reducing/eliminating intake of highly processed trans fatty acids.   Exercise 30 minutes a day for 4-5 days a week.   Eat more fiber.         Endocrine    Hyperthyroidism    The current medical regimen is effective;  continue present plan      Vitamin D deficiency    The current medical regimen is effective;  continue present plan      Overview     Cannot tolerate prescription dosage of medication            GI    GERD with esophagitis    -stable, discussed with patient about avoiding potential dietary triggers  -avoid spicy/greasy/sour/acidic foods, as well as tea/coffee/chocolate if possible  -Tylenol as needed for pain, avoid NSAIDs  -Keep food diary      Overview     Nissen fundoplication   -  Will follow up with [facility].           Other Visit Diagnoses       Encounter for preventive health examination    -  Primary    Counseled on age appropriate medical preventative services including age appropriate cancer screenings, age appropriate  eye and dental exams, over all nutritional health, need for a consistent exercise regimen, and an over all push towards maintaining a vigorous and active lifestyle.  Counseled on age appropriate vaccines and discussed upcoming health care needs based on age/gender. Discussed good sleep hygiene and stress management.      Advanced directives, counseling/discussion        I offered to discuss advanced care planning, including how to pick a person who would make decisions for you if you were unable to make them for yourself, called a health care power of , and what kind of decisions you might make such as use of life sustaining treatments such as ventilators and tube feeding when faced with a life limiting illness recorded on a living will that they will need to know. (How you want to be cared for as you near the end of your natural life)     X Patient is interested in learning more about how to make advanced directives.  I provided them paperwork and offered to discuss this with them.                --------------------------------------------    ** See Completed Assessments for Annual Wellness Visit within the encounter summary.**    The following assessments were completed:  Living Situation  CAGE  Depression Screening  Timed Get Up and Go  Whisper Test  Cognitive Function Screening  Nutrition Screening  ADL Screening  PAQ Screening      Provided Meera Britt  with a 5-10 year written screening schedule and personal prevention plan. Recommendations were developed using the USPSTF age appropriate recommendations. Education, counseling, and referrals were provided as needed. After Visit Summary printed and given to patient which includes a list of additional screenings\tests needed.    Review for Opioid Screening: Pt does not have Rx for Opioids     Review for Substance Use Disorders: Patient does not abuse use substances    Exam     Review of Systems:  (as noted above)  Review of Systems   Constitutional:  " Negative for fever.   HENT:  Negative for trouble swallowing.    Eyes:  Negative for visual disturbance.   Respiratory:  Negative for chest tightness and shortness of breath.    Cardiovascular:  Negative for chest pain.   Gastrointestinal:  Negative for blood in stool.       Physical Exam:   Physical Exam  Constitutional:       General: She is not in acute distress.     Appearance: She is not ill-appearing or diaphoretic.   HENT:      Head: Normocephalic and atraumatic.   Cardiovascular:      Rate and Rhythm: Normal rate and regular rhythm.      Heart sounds: No murmur heard.     No friction rub. No gallop.   Pulmonary:      Effort: No respiratory distress.   Chest:      Chest wall: No tenderness.   Musculoskeletal:      Cervical back: No rigidity.   Skin:     Capillary Refill: Capillary refill takes 2 to 3 seconds.   Neurological:      General: No focal deficit present.      Mental Status: She is alert and oriented to person, place, and time.       Vitals:    03/07/24 1515   BP: 120/72   Pulse: 71   Temp: 98.2 °F (36.8 °C)   TempSrc: Oral   SpO2: 97%   Weight: 66.3 kg (146 lb 4.4 oz)   Height: 5' 2" (1.575 m)      Body mass index is 26.75 kg/m².    Clock:              Health Maintenance:  Health Maintenance         Date Due Completion Date    RSV Vaccine (Age 60+ and Pregnant patients) (1 - 1-dose 60+ series) Never done ---    Colorectal Cancer Screening 10/26/2022 10/26/2017    COVID-19 Vaccine (4 - 2023-24 season) 09/01/2023 3/15/2023    Hemoglobin A1c (Prediabetes) 05/12/2024 5/12/2023    Mammogram 10/18/2024 10/18/2023    DEXA Scan 08/22/2026 8/22/2022    Lipid Panel 05/12/2028 5/12/2023    TETANUS VACCINE 09/22/2031 9/22/2021            Advised patient on the importance of completing overdue health maintenance items      Follow Up:  Follow up in about 6 months (around 9/7/2024).    History     Past Medical History:  Past Medical History:   Diagnosis Date    Allergy     Arthritis     Endometrial cancer 1980    " GERD (gastroesophageal reflux disease)     Hyperlipidemia     Thyroid disease        Past Surgical History:  Past Surgical History:   Procedure Laterality Date    COLONOSCOPY N/A 10/26/2017    Procedure: COLONOSCOPY;  Surgeon: Heladio Gonzalez MD;  Location: [facility];  Service: Endoscopy;  Laterality: N/A;    HYSTERECTOMY  1980    OOPHORECTOMY      TONSILLECTOMY         Social History:  Social History     Socioeconomic History    Marital status:    Tobacco Use    Smoking status: Never     Passive exposure: Never    Smokeless tobacco: Never   Substance and Sexual Activity    Alcohol use: No    Drug use: Never    Sexual activity: Not Currently     Social Determinants of Health     Financial Resource Strain: Low Risk  (3/7/2024)    Overall Financial Resource Strain (CARDIA)     Difficulty of Paying Living Expenses: Not very hard   Food Insecurity: No Food Insecurity (3/7/2024)    Hunger Vital Sign     Worried About Running Out of Food in the Last Year: Never true     Ran Out of Food in the Last Year: Never true   Transportation Needs: No Transportation Needs (3/7/2024)    PRAPARE - Transportation     Lack of Transportation (Medical): No     Lack of Transportation (Non-Medical): No   Physical Activity: Sufficiently Active (3/7/2024)    Exercise Vital Sign     Days of Exercise per Week: 4 days     Minutes of Exercise per Session: 40 min   Stress: No Stress Concern Present (3/7/2024)    [place] [employer] - Occupational Stress Questionnaire     Feeling of Stress : Not at all   Social Connections: Moderately Isolated (3/7/2024)    Social Connection and Isolation Panel [NHANES]     Frequency of Communication with Friends and Family: More than three times a week     Frequency of Social Gatherings with Friends and Family: More than three times a week     Attends Quaker Services: Never     Active Member of Clubs or Organizations: No     Attends Club or Organization Meetings: Never     Marital  Status:    Housing Stability: High Risk (3/7/2024)    Housing Stability Vital Sign     Unable to Pay for Housing in the Last Year: No     Number of Places Lived in the Last Year: 3     Unstable Housing in the Last Year: No       Family History:  History reviewed. No pertinent family history.    Allergies and Medications: (updated and reviewed)  Review of patient's allergies indicates:   Allergen Reactions    Iodine and iodide containing products      bloating    Sulfa (sulfonamide antibiotics) Nausea And Vomiting     Current Outpatient Medications   Medication Sig Dispense Refill    fenofibrate 160 MG Tab Take 1 tablet by mouth once daily 90 tablet 1    fexofenadine/pseudoephedrine (ALLERGY RELIEF D ORAL) Take by mouth.      methIMAzole (TAPAZOLE) 5 MG Tab Take 1 tablet by mouth once daily 90 tablet 3    pantoprazole (PROTONIX) 20 MG tablet Take 1 tablet (20 mg total) by mouth once daily. 90 tablet 3    rosuvastatin (CRESTOR) 5 MG tablet Take 1 tablet (5 mg total) by mouth once daily. 90 tablet 3    brompheniramine-pseudoeph-DM (BROMFED DM) 2-30-10 mg/5 mL Syrp Take 10 mLs by mouth every 4 (four) hours as needed.      flaxseed oil Oil 1,200 mg by Misc.(Non-Drug; Combo Route) route 2 (two) times daily.      lansoprazole (PREVACID) 15 MG capsule Take 1 capsule (15 mg total) by mouth once daily. (Patient not taking: Reported on 3/7/2024) 90 capsule 3    magnesium oxide 400 mg Cap Take 1 capsule by mouth once.      omega-3 fatty acids/fish oil (FISH OIL-OMEGA-3 FATTY ACIDS) 300-1,000 mg capsule Take by mouth once daily.       Current Facility-Administered Medications   Medication Dose Route Frequency Provider Last Rate Last Admin    dexamethasone injection 8 mg  8 mg Intramuscular 1 time in Clinic/HOD Laina Erwin PA-C               - The patient is given an After Visit Summary that lists all medications with directions, allergies, education, orders placed during this encounter and follow-up instructions.       - I have reviewed the patient's medical information including past medical, family, and social history sections including the medications and allergies.      - We discussed the patient's current medications.     This note was created by combination of typed  and MModal dictation.  Transcription errors may be present.  If there are any questions, please contact me.       Derek Dimas NP

## 2024-03-07 NOTE — PATIENT INSTRUCTIONS
Counseling and Referral of Other Preventative  (Italic type indicates deductible and co-insurance are waived)    Patient Name: Meera Britt  Today's Date: 3/7/2024    Health Maintenance       Date Due Completion Date    RSV Vaccine (Age 60+ and Pregnant patients) (1 - 1-dose 60+ series) Never done ---    Colorectal Cancer Screening 10/26/2022 10/26/2017    COVID-19 Vaccine (4 - 2023-24 season) 09/01/2023 3/15/2023    Hemoglobin A1c (Prediabetes) 05/12/2024 5/12/2023    Mammogram 10/18/2024 10/18/2023    DEXA Scan 08/22/2026 8/22/2022    Lipid Panel 05/12/2028 5/12/2023    TETANUS VACCINE 09/22/2031 9/22/2021        No orders of the defined types were placed in this encounter.    The following information is provided to all patients.  This information is to help you find resources for any of the problems found today that may be affecting your health:                  Living healthy guide: www.[facility].[place].gov      Understanding Diabetes: www.diabetes.org      Eating healthy: www.cdc.gov/healthyweight      CDC home safety checklist: www.cdc.gov/steadi/patient.html      Agency on Aging: www.goea.[place].gov      Alcoholics anonymous (AA): www.aa.org      Physical Activity: www.floyd.nih.gov/sb9cooe      Tobacco use: www.quitwithusla.org

## 2024-04-03 ENCOUNTER — CLINICAL SUPPORT (OUTPATIENT)
Dept: ENDOSCOPY | Facility: HOSPITAL | Age: 74
End: 2024-04-03
Attending: FAMILY MEDICINE
Payer: MEDICARE

## 2024-04-03 DIAGNOSIS — Z12.11 ENCOUNTER FOR SCREENING FOR COLORECTAL MALIGNANT NEOPLASM: ICD-10-CM

## 2024-04-03 DIAGNOSIS — Z12.12 ENCOUNTER FOR SCREENING FOR COLORECTAL MALIGNANT NEOPLASM: ICD-10-CM

## 2024-04-03 NOTE — PLAN OF CARE
Spoke to pt. To schedule colonoscopy. Pt. Stated she only [facility]. New refendo made for pt. To get a call back. Scheduling Dept. Phone number given to pt. As well.

## 2024-04-05 ENCOUNTER — HOSPITAL ENCOUNTER (EMERGENCY)
Facility: HOSPITAL | Age: 74
Discharge: HOME | End: 2024-04-05
Attending: EMERGENCY MEDICINE
Payer: MEDICARE

## 2024-04-05 VITALS
HEIGHT: 62 IN | WEIGHT: 145 LBS | DIASTOLIC BLOOD PRESSURE: 81 MMHG | RESPIRATION RATE: 18 BRPM | HEART RATE: 64 BPM | OXYGEN SATURATION: 97 % | SYSTOLIC BLOOD PRESSURE: 151 MMHG | TEMPERATURE: 98 F | BODY MASS INDEX: 26.68 KG/M2

## 2024-04-05 DIAGNOSIS — R42 DIZZINESS: ICD-10-CM

## 2024-04-05 DIAGNOSIS — H81.391 PERIPHERAL VERTIGO INVOLVING RIGHT EAR: Primary | ICD-10-CM

## 2024-04-05 LAB
ALBUMIN SERPL BCP-MCNC: 4.9 G/DL (ref 3.5–5.2)
ALP SERPL-CCNC: 64 U/L (ref 55–135)
ALT SERPL W/O P-5'-P-CCNC: 19 U/L (ref 10–44)
ANION GAP SERPL CALC-SCNC: 6 MMOL/L (ref 8–16)
AST SERPL-CCNC: 24 U/L (ref 10–40)
BACTERIA #/AREA URNS HPF: ABNORMAL /HPF
BASOPHILS # BLD AUTO: 0.03 K/UL (ref 0–0.2)
BASOPHILS NFR BLD: 0.4 % (ref 0–1.9)
BILIRUB SERPL-MCNC: 0.4 MG/DL (ref 0.1–1)
BILIRUB UR QL STRIP: NEGATIVE
BUN SERPL-MCNC: 11 MG/DL (ref 8–23)
CALCIUM SERPL-MCNC: 10.2 MG/DL (ref 8.7–10.5)
CHLORIDE SERPL-SCNC: 106 MMOL/L (ref 95–110)
CLARITY UR: CLEAR
CO2 SERPL-SCNC: 28 MMOL/L (ref 23–29)
COLOR UR: YELLOW
CREAT SERPL-MCNC: 0.7 MG/DL (ref 0.5–1.4)
DIFFERENTIAL METHOD BLD: ABNORMAL
EOSINOPHIL # BLD AUTO: 0 K/UL (ref 0–0.5)
EOSINOPHIL NFR BLD: 0.1 % (ref 0–8)
ERYTHROCYTE [DISTWIDTH] IN BLOOD BY AUTOMATED COUNT: 12 % (ref 11.5–14.5)
EST. GFR  (NO RACE VARIABLE): >60 ML/MIN/1.73 M^2
GLUCOSE SERPL-MCNC: 106 MG/DL (ref 70–110)
GLUCOSE UR QL STRIP: NEGATIVE
HCT VFR BLD AUTO: 42.9 % (ref 37–48.5)
HGB BLD-MCNC: 14 G/DL (ref 12–16)
HGB UR QL STRIP: NEGATIVE
IMM GRANULOCYTES # BLD AUTO: 0.02 K/UL (ref 0–0.04)
IMM GRANULOCYTES NFR BLD AUTO: 0.2 % (ref 0–0.5)
KETONES UR QL STRIP: NEGATIVE
LEUKOCYTE ESTERASE UR QL STRIP: ABNORMAL
LYMPHOCYTES # BLD AUTO: 1.8 K/UL (ref 1–4.8)
LYMPHOCYTES NFR BLD: 22 % (ref 18–48)
MCH RBC QN AUTO: 31.2 PG (ref 27–31)
MCHC RBC AUTO-ENTMCNC: 32.6 G/DL (ref 32–36)
MCV RBC AUTO: 96 FL (ref 82–98)
MICROSCOPIC COMMENT: ABNORMAL
MONOCYTES # BLD AUTO: 0.4 K/UL (ref 0.3–1)
MONOCYTES NFR BLD: 4.7 % (ref 4–15)
NEUTROPHILS # BLD AUTO: 6 K/UL (ref 1.8–7.7)
NEUTROPHILS NFR BLD: 72.6 % (ref 38–73)
NITRITE UR QL STRIP: NEGATIVE
NRBC BLD-RTO: 0 /100 WBC
PH UR STRIP: 7 [PH] (ref 5–8)
PLATELET # BLD AUTO: 424 K/UL (ref 150–450)
PMV BLD AUTO: 10.2 FL (ref 9.2–12.9)
POCT GLUCOSE: 116 MG/DL (ref 70–110)
POTASSIUM SERPL-SCNC: 4.5 MMOL/L (ref 3.5–5.1)
PROT SERPL-MCNC: 7.9 G/DL (ref 6–8.4)
PROT UR QL STRIP: NEGATIVE
RBC # BLD AUTO: 4.49 M/UL (ref 4–5.4)
SODIUM SERPL-SCNC: 140 MMOL/L (ref 136–145)
SP GR UR STRIP: 1.01 (ref 1–1.03)
SQUAMOUS #/AREA URNS HPF: 1 /HPF
URN SPEC COLLECT METH UR: ABNORMAL
UROBILINOGEN UR STRIP-ACNC: NEGATIVE EU/DL
WBC # BLD AUTO: 8.27 K/UL (ref 3.9–12.7)
WBC #/AREA URNS HPF: 6 /HPF (ref 0–5)

## 2024-04-05 PROCEDURE — 25000003 PHARM REV CODE 250: Performed by: EMERGENCY MEDICINE

## 2024-04-05 PROCEDURE — 99284 EMERGENCY DEPT VISIT MOD MDM: CPT | Mod: 25

## 2024-04-05 PROCEDURE — 81000 URINALYSIS NONAUTO W/SCOPE: CPT | Performed by: NURSE PRACTITIONER

## 2024-04-05 PROCEDURE — 80053 COMPREHEN METABOLIC PANEL: CPT | Performed by: NURSE PRACTITIONER

## 2024-04-05 PROCEDURE — 82962 GLUCOSE BLOOD TEST: CPT

## 2024-04-05 PROCEDURE — 85025 COMPLETE CBC W/AUTO DIFF WBC: CPT | Performed by: NURSE PRACTITIONER

## 2024-04-05 PROCEDURE — 93010 ELECTROCARDIOGRAM REPORT: CPT | Mod: ,,, | Performed by: INTERNAL MEDICINE

## 2024-04-05 PROCEDURE — 93005 ELECTROCARDIOGRAM TRACING: CPT

## 2024-04-05 RX ORDER — PROMETHAZINE HYDROCHLORIDE 25 MG/1
25 TABLET ORAL EVERY 6 HOURS PRN
Qty: 15 TABLET | Refills: 1 | Status: SHIPPED | OUTPATIENT
Start: 2024-04-05

## 2024-04-05 RX ORDER — ONDANSETRON 4 MG/1
4 TABLET, ORALLY DISINTEGRATING ORAL
Status: COMPLETED | OUTPATIENT
Start: 2024-04-05 | End: 2024-04-05

## 2024-04-05 RX ORDER — MECLIZINE HYDROCHLORIDE 25 MG/1
25 TABLET ORAL EVERY 6 HOURS PRN
Qty: 30 TABLET | Refills: 0 | Status: SHIPPED | OUTPATIENT
Start: 2024-04-05 | End: 2024-04-25

## 2024-04-05 RX ADMIN — ONDANSETRON 4 MG: 4 TABLET, ORALLY DISINTEGRATING ORAL at 03:04

## 2024-04-05 NOTE — DISCHARGE INSTRUCTIONS
Meclizine for dizziness.  Phenergan for nausea.  Moves slowly.  If you get dizzy please remained perfectly still.  Please follow-up with the Ear Nose and Throat doctor above.  You may also follow up with the primary care doctor.  Otherwise, usual medicines.  Rest.

## 2024-04-05 NOTE — ED TRIAGE NOTES
"Pt presents to ED via POV with c/o dizziness "like the room is spinning" that  started around 0600 this morning after waking. Reports hx vertigo, took meclizine x3 doses without relief. Pt also reports having intermittent "fluttering" in chest x several days. Denies CP, SOB, or cardiac hx.   "

## 2024-04-05 NOTE — FIRST PROVIDER EVALUATION
"Medical screening examination initiated.  I have conducted a focused provider triage encounter, findings are as follows:    Brief history of present illness:  "Vertigo" that started this AM; no relief with 3 doses of Meclizine    Vitals:    04/05/24 1338   BP: (!) 183/81   BP Location: Right arm   Patient Position: Sitting   Pulse: 70   Resp: 20   Temp: 97.5 °F (36.4 °C)   TempSrc: Oral   SpO2: 99%   Weight: 65.8 kg (145 lb)   Height: 5' 2" (1.575 m)       Pertinent physical exam:  NAD, ambulatory with steady gait, AAO X's 3; no neuro deficits noted    Brief workup plan:  labs, Glucose, EKG, UA    Preliminary workup initiated; this workup will be continued and followed by the physician or advanced practice provider that is assigned to the patient when roomed.  "

## 2024-04-05 NOTE — ED PROVIDER NOTES
"Encounter Date: 4/5/2024       History     Chief Complaint   Patient presents with    Dizziness     Pt reports waking up at 6am this morning and going to bathroom, states on the way back got dizzy and "head dive" into bed. Reports taking meclizine x 3 with no relief. Hx vertigo. Reports dizziness when turning head.      HPI  This 73-year-old female presents emergency room waking this morning, going to the bathroom, and on returning to bed, developed some severe vertigo, prompting her to fall forward onto the bed.  The patient has a history of peripheral vertigo.  She also took a meclizine at 7:00 a.m. in the morning she denies other neurologic deficits there are no problems with vision hearing speech swallowing.  The patient has no headache.  She has no ear pain or new hearing deficits.  This was like previous episodes of vertigo in the past.  She still has symptoms after taking an additional meclizine tablet.  There is no sensation of feeling like passing out.  Review of patient's allergies indicates:   Allergen Reactions    Iodine and iodide containing products      bloating    Sulfa (sulfonamide antibiotics) Nausea And Vomiting     Past Medical History:   Diagnosis Date    Allergy     Arthritis     Endometrial cancer 1980    GERD (gastroesophageal reflux disease)     Hyperlipidemia     Thyroid disease      Past Surgical History:   Procedure Laterality Date    COLONOSCOPY N/A 10/26/2017    Procedure: COLONOSCOPY;  Surgeon: Heladio Gonzalez MD;  Location: [facility];  Service: Endoscopy;  Laterality: N/A;    HYSTERECTOMY  1980    OOPHORECTOMY      TONSILLECTOMY       No family history on file.  Social History     Tobacco Use    Smoking status: Never     Passive exposure: Never    Smokeless tobacco: Never   Substance Use Topics    Alcohol use: No    Drug use: Never     Review of Systems  The patient was questioned specifically with regard to the following.  General: Fever, chills, sweats. Neuro: Headache. Eyes: eye " problems. ENT: Ear pain, sore throat. Cardiovascular: Chest pain. Respiratory: Cough, shortness of breath. Gastrointestinal: Abdominal pain, vomiting, diarrhea. Genitourinary: Painful urination.  Musculoskeletal: Arm and leg problems. Skin: Rash.  The review of systems was negative except for the following:  The patient reports her dizziness is much much worse with head movement.  Particularly when she lays backward with a right ear down.  The patient tried Epley maneuvers before coming to the emergency room.  Physical Exam     Initial Vitals [04/05/24 1338]   BP Pulse Resp Temp SpO2   (!) 183/81 70 20 97.5 °F (36.4 °C) 99 %      MAP       --         Physical Exam  The patient was examined specifically for the following:   General:No significant distress, Good color, Warm and dry. Head and neck:Scalp atraumatic, Neck supple. Neurological:Appropriate conversation, Gross motor deficits. Eyes:Conjugate gaze, Clear corneas. ENT: No epistaxis. Cardiac: Regular rate and rhythm, Grossly normal heart tones. Pulmonary: Wheezing, Rales. Gastrointestinal: Abdominal tenderness, Abdominal distention. Musculoskeletal: Extremity deformity, Apparent pain with range of motion of the joints. Skin: Rash.   The findings on examination were normal except for the following:  There is no ataxia.  I walked this patient in the emergency room.  Her vertigo can be provoked with head movement.  The lungs are clear.  The heart tones are normal.  The abdomen is soft.  ED Course   Procedures  Labs Reviewed   CBC W/ AUTO DIFFERENTIAL - Abnormal; Notable for the following components:       Result Value    MCH 31.2 (*)     All other components within normal limits   COMPREHENSIVE METABOLIC PANEL - Abnormal; Notable for the following components:    Anion Gap 6 (*)     All other components within normal limits   URINALYSIS, REFLEX TO URINE CULTURE - Abnormal; Notable for the following components:    Leukocytes, UA 3+ (*)     All other components  within normal limits    Narrative:     Specimen Source->Urine   URINALYSIS MICROSCOPIC - Abnormal; Notable for the following components:    WBC, UA 6 (*)     All other components within normal limits    Narrative:     Specimen Source->Urine   POCT GLUCOSE - Abnormal; Notable for the following components:    POCT Glucose 116 (*)     All other components within normal limits   POCT GLUCOSE MONITORING CONTINUOUS     EKG Readings: (Independently Interpreted)   This patient is in a normal sinus rhythm heart rate of 66; there are no significant ST segment or T-wave changes.  There is no definite evidence of acute myocardial infarction or malignant arrhythmia.  This is doctor Gabriela dictating an I independently interpreted this EKG.       Imaging Results    None          Medications   ondansetron disintegrating tablet 4 mg (has no administration in time range)     Medical Decision Making  Given the above, this patient has a vertigo syndrome that comes and goes.  It is precipitated by head movement and relieved by keeping the head still.  The patient has had this before she has taken meclizine.  She took meclizine today.  She had no relief.  She has nausea but no vomiting.  There are no other neurologic deficits.  Laboratory evaluation fails to reveal any evidence of electrolyte abnormalities hepatic or renal failure.  There is no leukocytosis or significant anemia.  The patient's urine is clean.  She has no dysuria.  I doubt urinary tract infection.  The patient's vital signs have been stable I doubt hypotension as a cause of the patient's dizziness.  There is no definite evidence of acute myocardial infarction or malignant arrhythmia, on EKG.  Believe this is a peripheral vertigo syndrome.                                  Clinical Impression:  Final diagnoses:  [R42] Dizziness  [H81.391] Peripheral vertigo involving right ear (Primary)          ED Disposition Condition    Discharge Stable          ED Prescriptions        Medication Sig Dispense Start Date End Date Auth. Provider    meclizine (ANTIVERT) 25 mg tablet Take 1 tablet (25 mg total) by mouth every 6 (six) hours as needed for Dizziness. 30 tablet 4/5/2024 -- Dudley Ochoa MD    promethazine (PHENERGAN) 25 MG tablet Take 1 tablet (25 mg total) by mouth every 6 (six) hours as needed for Nausea. 15 tablet 4/5/2024 -- Dudley Ochoa MD          Follow-up Information       Follow up With Specialties Details Why Contact Info    Giuliana James MD Otolaryngology In 1 week  [address] [place] [address]  [phone]               Dudley Ochoa MD  04/05/24 1544

## 2024-04-08 LAB
OHS QRS DURATION: 74 MS
OHS QTC CALCULATION: 465 MS

## 2024-04-19 ENCOUNTER — TELEPHONE (OUTPATIENT)
Dept: ENDOSCOPY | Facility: HOSPITAL | Age: 74
End: 2024-04-19
Payer: MEDICARE

## 2024-04-19 NOTE — TELEPHONE ENCOUNTER
Returned patient call that was left on voicemail no answer left voicemail asking patient to call us back @ [phone]

## 2024-04-22 ENCOUNTER — TELEPHONE (OUTPATIENT)
Dept: ENDOSCOPY | Facility: HOSPITAL | Age: 74
End: 2024-04-22
Payer: MEDICARE

## 2024-04-22 VITALS — HEIGHT: 62 IN | BODY MASS INDEX: 26.68 KG/M2 | WEIGHT: 145 LBS

## 2024-04-22 DIAGNOSIS — Z12.11 ENCOUNTER FOR SCREENING FOR COLORECTAL MALIGNANT NEOPLASM: Primary | ICD-10-CM

## 2024-04-22 DIAGNOSIS — Z12.12 ENCOUNTER FOR SCREENING FOR COLORECTAL MALIGNANT NEOPLASM: Primary | ICD-10-CM

## 2024-04-22 DIAGNOSIS — Z12.11 ENCOUNTER FOR SCREENING COLONOSCOPY: Primary | ICD-10-CM

## 2024-04-22 NOTE — TELEPHONE ENCOUNTER
Spoke to patient to schedule procedure(s) Colonoscopy       Physician to perform procedure(s) Dr. PAYAL Bassett  Date of Procedure (s) 6/5/24  Arrival Time 12:30 PM  Time of Procedure(s) 1:30 PM   Location of Procedure(s) [place] [address]nd Floor  Type of Rx Prep sent to patient: PEG  Instructions provided to patient via [facility]    Patient was informed on the following information and verbalized understanding. Screening questionnaire reviewed with patient and complete. If procedure requires anesthesia, a responsible adult needs to be present to accompany the patient home, patient cannot drive after receiving anesthesia. Appointment details are tentative, especially check-in time. Patient will receive a prep-op call 7 days prior to confirm check-in time for procedure. If applicable the patient should contact their pharmacy to verify Rx for procedure prep is ready for pick-up. Patient was advised to call the scheduling department at [phone] if pharmacy states no Rx is available. Patient was advised to call the endoscopy scheduling department if any questions or concerns arise.      [facility] Scheduling Department

## 2024-04-25 ENCOUNTER — OFFICE VISIT (OUTPATIENT)
Dept: FAMILY MEDICINE | Facility: CLINIC | Age: 74
End: 2024-04-25
Payer: MEDICARE

## 2024-04-25 VITALS
BODY MASS INDEX: 26.21 KG/M2 | HEIGHT: 62 IN | HEART RATE: 79 BPM | WEIGHT: 142.44 LBS | TEMPERATURE: 98 F | DIASTOLIC BLOOD PRESSURE: 62 MMHG | OXYGEN SATURATION: 97 % | SYSTOLIC BLOOD PRESSURE: 120 MMHG

## 2024-04-25 DIAGNOSIS — H81.10 BENIGN PAROXYSMAL POSITIONAL VERTIGO, UNSPECIFIED LATERALITY: Primary | ICD-10-CM

## 2024-04-25 PROCEDURE — 3078F DIAST BP <80 MM HG: CPT | Mod: CPTII,S$GLB,, | Performed by: FAMILY MEDICINE

## 2024-04-25 PROCEDURE — 1159F MED LIST DOCD IN RCRD: CPT | Mod: CPTII,S$GLB,, | Performed by: FAMILY MEDICINE

## 2024-04-25 PROCEDURE — 99999 PR PBB SHADOW E&M-EST. PATIENT-LVL III: CPT | Mod: PBBFAC,,, | Performed by: FAMILY MEDICINE

## 2024-04-25 PROCEDURE — 3008F BODY MASS INDEX DOCD: CPT | Mod: CPTII,S$GLB,, | Performed by: FAMILY MEDICINE

## 2024-04-25 PROCEDURE — 99214 OFFICE O/P EST MOD 30 MIN: CPT | Mod: S$GLB,,, | Performed by: FAMILY MEDICINE

## 2024-04-25 PROCEDURE — 3074F SYST BP LT 130 MM HG: CPT | Mod: CPTII,S$GLB,, | Performed by: FAMILY MEDICINE

## 2024-04-25 RX ORDER — MECLIZINE HYDROCHLORIDE 25 MG/1
25 TABLET ORAL 3 TIMES DAILY PRN
Qty: 60 TABLET | Refills: 0 | Status: SHIPPED | OUTPATIENT
Start: 2024-04-25

## 2024-04-25 NOTE — PROGRESS NOTES
[facility]  Progress Note    SUBJECTIVE:     Chief Complaint   Patient presents with    Dizziness    Ear Fullness     Right        HPI   Meera Britt  is a 73 y.o. female here for c/o dizziness on/off. No current dizziness. Was diagnosed with vertigo. The meclizine does helps. Certain movements make it worst.     Review of patient's allergies indicates:   Allergen Reactions    Iodine and iodide containing products      bloating    Sulfa (sulfonamide antibiotics) Nausea And Vomiting       Past Medical History:   Diagnosis Date    Allergy     Arthritis     Endometrial cancer 1980    GERD (gastroesophageal reflux disease)     Hyperlipidemia     Thyroid disease      Past Surgical History:   Procedure Laterality Date    COLONOSCOPY N/A 10/26/2017    Procedure: COLONOSCOPY;  Surgeon: Heladio Gonzalez MD;  Location: [facility];  Service: Endoscopy;  Laterality: N/A;    HYSTERECTOMY  1980    OOPHORECTOMY      TONSILLECTOMY       No family history on file.  Social History     Tobacco Use    Smoking status: Never     Passive exposure: Never    Smokeless tobacco: Never   Substance Use Topics    Alcohol use: No    Drug use: Never        Review of Systems   Constitutional:  Negative for chills and fever.   HENT: Negative.     Respiratory: Negative.  Negative for shortness of breath.    Cardiovascular: Negative.  Negative for chest pain.   Gastrointestinal: Negative.  Negative for abdominal pain, nausea and vomiting.   Genitourinary: Negative.    Neurological:  Positive for dizziness. Negative for headaches.   All other systems reviewed and are negative.    OBJECTIVE:     Vitals:    04/25/24 1318   BP: 120/62   Pulse: 79   Temp: 97.9 °F (36.6 °C)     Body mass index is 26.05 kg/m².    Physical Exam  Constitutional:       General: She is not in acute distress.     Appearance: Normal appearance. She is not diaphoretic.   HENT:      Head: Normocephalic and atraumatic.      Right Ear: External ear normal. No  hemotympanum. Tympanic membrane is not perforated, erythematous, retracted or bulging.      Left Ear: External ear normal. No hemotympanum. Tympanic membrane is not perforated, erythematous, retracted or bulging.      Mouth/Throat:      Pharynx: No oropharyngeal exudate.   Eyes:      Conjunctiva/sclera: Conjunctivae normal.   Pulmonary:      Effort: Pulmonary effort is normal.   Neurological:      Mental Status: She is alert and oriented to person, place, and time.         Old records were reviewed. Labs and/or images were independently reviewed.    ASSESSMENT     1. Benign paroxysmal positional vertigo, unspecified laterality        PLAN:     Benign paroxysmal positional vertigo, unspecified laterality  -     meclizine (ANTIVERT) 25 mg tablet; Take 1 tablet (25 mg total) by mouth 3 (three) times daily as needed for Dizziness.  Dispense: 60 tablet; Refill: 0  -     do epley maneuvers, stay hydrated.       RTC PRN    Moody Arriaza MD  04/25/2024 1:35 PM

## 2024-05-22 DIAGNOSIS — R73.03 PREDIABETES: ICD-10-CM

## 2024-05-29 ENCOUNTER — TELEPHONE (OUTPATIENT)
Dept: ENDOSCOPY | Facility: HOSPITAL | Age: 74
End: 2024-05-29
Payer: MEDICARE

## 2024-05-29 NOTE — TELEPHONE ENCOUNTER
Pt returned call to confirm colonoscopy. Pt has prep and instructions and confirmed ride. Verified if any GLP1's and blood thinners. Pre-call complete, Pt does not have any further questions. Arrival time: 11:45 AM

## 2024-06-04 ENCOUNTER — ANESTHESIA EVENT (OUTPATIENT)
Dept: ENDOSCOPY | Facility: HOSPITAL | Age: 74
End: 2024-06-04
Payer: MEDICARE

## 2024-06-04 ENCOUNTER — TELEPHONE (OUTPATIENT)
Dept: GASTROENTEROLOGY | Facility: CLINIC | Age: 74
End: 2024-06-04
Payer: MEDICARE

## 2024-06-05 ENCOUNTER — HOSPITAL ENCOUNTER (OUTPATIENT)
Facility: HOSPITAL | Age: 74
Discharge: HOME | End: 2024-06-05
Attending: INTERNAL MEDICINE | Admitting: INTERNAL MEDICINE
Payer: MEDICARE

## 2024-06-05 ENCOUNTER — ANESTHESIA (OUTPATIENT)
Dept: ENDOSCOPY | Facility: HOSPITAL | Age: 74
End: 2024-06-05
Payer: MEDICARE

## 2024-06-05 VITALS
SYSTOLIC BLOOD PRESSURE: 146 MMHG | HEIGHT: 62 IN | RESPIRATION RATE: 10 BRPM | BODY MASS INDEX: 27.23 KG/M2 | DIASTOLIC BLOOD PRESSURE: 69 MMHG | HEART RATE: 60 BPM | OXYGEN SATURATION: 98 % | TEMPERATURE: 98 F | WEIGHT: 148 LBS

## 2024-06-05 DIAGNOSIS — Z86.010 PERSONAL HISTORY OF COLONIC POLYPS: ICD-10-CM

## 2024-06-05 PROCEDURE — D9220A PRA ANESTHESIA: Mod: CRNA,,, | Performed by: NURSE ANESTHETIST, CERTIFIED REGISTERED

## 2024-06-05 PROCEDURE — 37000008 HC ANESTHESIA 1ST 15 MINUTES: Performed by: INTERNAL MEDICINE

## 2024-06-05 PROCEDURE — 25000003 PHARM REV CODE 250: Performed by: INTERNAL MEDICINE

## 2024-06-05 PROCEDURE — 37000009 HC ANESTHESIA EA ADD 15 MINS: Performed by: INTERNAL MEDICINE

## 2024-06-05 PROCEDURE — 63600175 PHARM REV CODE 636 W HCPCS: Performed by: NURSE ANESTHETIST, CERTIFIED REGISTERED

## 2024-06-05 PROCEDURE — G0105 COLORECTAL SCRN; HI RISK IND: HCPCS | Performed by: INTERNAL MEDICINE

## 2024-06-05 PROCEDURE — 25000003 PHARM REV CODE 250: Performed by: NURSE ANESTHETIST, CERTIFIED REGISTERED

## 2024-06-05 PROCEDURE — D9220A PRA ANESTHESIA: Mod: ANES,,, | Performed by: ANESTHESIOLOGY

## 2024-06-05 PROCEDURE — G0105 COLORECTAL SCRN; HI RISK IND: HCPCS | Mod: ,,, | Performed by: INTERNAL MEDICINE

## 2024-06-05 RX ORDER — SODIUM CHLORIDE 0.9 % (FLUSH) 0.9 %
10 SYRINGE (ML) INJECTION
Status: DISCONTINUED | OUTPATIENT
Start: 2024-06-05 | End: 2024-06-05 | Stop reason: HOSPADM

## 2024-06-05 RX ORDER — SODIUM CHLORIDE 9 MG/ML
INJECTION, SOLUTION INTRAVENOUS CONTINUOUS
Status: DISCONTINUED | OUTPATIENT
Start: 2024-06-05 | End: 2024-06-05 | Stop reason: HOSPADM

## 2024-06-05 RX ORDER — LIDOCAINE HYDROCHLORIDE 20 MG/ML
INJECTION INTRAVENOUS
Status: DISCONTINUED | OUTPATIENT
Start: 2024-06-05 | End: 2024-06-05

## 2024-06-05 RX ORDER — PROPOFOL 10 MG/ML
VIAL (ML) INTRAVENOUS CONTINUOUS PRN
Status: DISCONTINUED | OUTPATIENT
Start: 2024-06-05 | End: 2024-06-05

## 2024-06-05 RX ORDER — PROPOFOL 10 MG/ML
VIAL (ML) INTRAVENOUS
Status: DISCONTINUED | OUTPATIENT
Start: 2024-06-05 | End: 2024-06-05

## 2024-06-05 RX ADMIN — PROPOFOL 70 MG: 10 INJECTION, EMULSION INTRAVENOUS at 01:06

## 2024-06-05 RX ADMIN — LIDOCAINE HYDROCHLORIDE 50 MG: 20 INJECTION, SOLUTION INTRAVENOUS at 01:06

## 2024-06-05 RX ADMIN — PROPOFOL 150 MCG/KG/MIN: 10 INJECTION, EMULSION INTRAVENOUS at 01:06

## 2024-06-05 RX ADMIN — SODIUM CHLORIDE: 9 INJECTION, SOLUTION INTRAVENOUS at 01:06

## 2024-06-05 RX ADMIN — SODIUM CHLORIDE: 9 INJECTION, SOLUTION INTRAVENOUS at 12:06

## 2024-06-05 NOTE — PROVATION PATIENT INSTRUCTIONS
Discharge Summary/Instructions after an Endoscopic Procedure  Patient Name: Meera Britt  Patient MRN: [id number]  Patient YOB: 1950 Wednesday, June 5, 2024  Delbert Bassett MD  Dear patient,  As a result of recent federal legislation (The Federal Cures Act), you may   receive lab or pathology results from your procedure in your [facility]   account before your physician is able to contact you. Your physician or   their representative will relay the results to you with their   recommendations at their soonest availability.  Thank you,  Your health is very important to us during the Covid Crisis. Following your   procedure today, you will receive a daily text for 2 weeks asking about   signs or symptoms of Covid 19.  Please respond to this text when you   receive it so we can follow up and keep you as safe as possible.   RESTRICTIONS:  During your procedure today, you received medications for sedation.  These   medications may affect your judgment, balance and coordination.  Therefore,   for 24 hours, you have the following restrictions:   - DO NOT drive a car, operate machinery, make legal/financial decisions,   sign important papers or drink alcohol.    ACTIVITY:  Today: no heavy lifting, straining or running due to procedural   sedation/anesthesia.  The following day: return to full activity including work.  DIET:  Eat and drink normally unless instructed otherwise.     TREATMENT FOR COMMON SIDE EFFECTS:  - Mild abdominal pain, nausea, belching, bloating or excessive gas:  rest,   eat lightly and use a heating pad.  - Sore Throat: treat with throat lozenges and/or gargle with warm salt   water.  - Because air was used during the procedure, expelling large amounts of air   from your rectum or belching is normal.  - If a bowel prep was taken, you may not have a bowel movement for 1-3 days.    This is normal.  SYMPTOMS TO WATCH FOR AND REPORT TO YOUR PHYSICIAN:  1. Abdominal pain or bloating, other than  gas cramps.  2. Chest pain.  3. Back pain.  4. Signs of infection such as: chills or fever occurring within 24 hours   after the procedure.  5. Rectal bleeding, which would show as bright red, maroon, or black stools.   (A tablespoon of blood from the rectum is not serious, especially if   hemorrhoids are present.)  6. Vomiting.  7. Weakness or dizziness.  GO DIRECTLY TO THE NEAREST EMERGENCY ROOM IF YOU HAVE ANY OF THE FOLLOWING:      Difficulty breathing              Chills and/or fever over 101 F   Persistent vomiting and/or vomiting blood   Severe abdominal pain   Severe chest pain   Black, tarry stools   Bleeding- more than one tablespoon   Any other symptom or condition that you feel may need urgent attention  Your doctor recommends these additional instructions:  If any biopsies were taken, your doctors clinic will contact you in 1 to 2   weeks with any results.  - Discharge patient to home.   - Patient has a contact number available for emergencies.  The signs and   symptoms of potential delayed complications were discussed with the   patient.  Return to normal activities tomorrow.  Written discharge   instructions were provided to the patient.   - Resume previous diet.   - Continue present medications.   - No repeat colonoscopy due to age and the absence of advanced adenomas.  For questions, problems or results please call your physician - Delbert Bassett MD.  EMERGENCY PHONE NUMBER: [phone],  LAB RESULTS: [phone]  IF A COMPLICATION OR EMERGENCY SITUATION ARISES AND YOU ARE UNABLE TO REACH   YOUR PHYSICIAN - GO DIRECTLY TO THE EMERGENCY ROOM.  Delbert Bassett MD  6/5/2024 1:59:27 PM  This report has been verified and signed electronically.  Dear patient,  As a result of recent federal legislation (The Federal Cures Act), you may   receive lab or pathology results from your procedure in your [facility]   account before your physician is able to contact you. Your physician or   their  representative will relay the results to you with their   recommendations at their soonest availability.  Thank you,  PROVATION

## 2024-06-05 NOTE — TRANSFER OF CARE
"Anesthesia Transfer of Care Note    Patient: Meera Britt    Procedure(s) Performed: Procedure(s) (LRB):  COLONOSCOPY (N/A)    Patient location: GI    Anesthesia Type: general    Transport from OR: Transported from OR on room air with adequate spontaneous ventilation    Post pain: adequate analgesia    Post assessment: no apparent anesthetic complications    Post vital signs: stable    Level of consciousness: responds to stimulation    Nausea/Vomiting: no nausea/vomiting    Complications: none    Transfer of care protocol was followed      Last vitals: Visit Vitals  BP (!) 162/72 (BP Location: Left arm, Patient Position: Lying)   Pulse 65   Temp 36.1 °C (97 °F) (Skin)   Resp 18   Ht 5' 2" (1.575 m)   Wt 67.1 kg (148 lb)   SpO2 97%   Breastfeeding No   BMI 27.07 kg/m²     "

## 2024-06-05 NOTE — ANESTHESIA POSTPROCEDURE EVALUATION
Anesthesia Post Evaluation    Patient: Meera Britt    Procedure(s) Performed: Procedure(s) (LRB):  COLONOSCOPY (N/A)    Final Anesthesia Type: general      Patient location during evaluation: PACU  Patient participation: Yes- Able to Participate  Level of consciousness: awake and alert  Post-procedure vital signs: reviewed and stable  Pain management: adequate  Airway patency: patent    PONV status at discharge: No PONV  Anesthetic complications: no      Cardiovascular status: blood pressure returned to baseline  Respiratory status: unassisted  Hydration status: euvolemic                Vitals Value Taken Time   /69 06/05/24 1430   Temp 36.6 °C (97.9 °F) 06/05/24 1400   Pulse 60 06/05/24 1430   Resp 10 06/05/24 1430   SpO2 98 % 06/05/24 1430         Event Time   Out of Recovery 14:30:00         Pain/Hiral Score: Hiral Score: 10 (6/5/2024  2:40 PM)

## 2024-06-05 NOTE — ANESTHESIA PREPROCEDURE EVALUATION
06/05/2024    Meera Britt is a 73 y.o., female.    Past Medical History:   Diagnosis Date    Allergy     Arthritis     Endometrial cancer 1980    GERD (gastroesophageal reflux disease)     Hyperlipidemia     Thyroid disease      Past Surgical History:   Procedure Laterality Date    COLONOSCOPY N/A 10/26/2017    Procedure: COLONOSCOPY;  Surgeon: Heladio Gonzalez MD;  Location: [facility];  Service: Endoscopy;  Laterality: N/A;    HYSTERECTOMY  1980    OOPHORECTOMY      TONSILLECTOMY             Pre-op Assessment    I have reviewed the Patient Summary Reports.     I have reviewed the Nursing Notes. I have reviewed the NPO Status.      Review of Systems  Anesthesia Hx:   History of prior surgery of interest to airway management or planning:            Denies Personal Hx of Anesthesia complications.                    Cardiovascular:  Exercise tolerance: good                                           Hepatic/GI:     GERD             Endocrine:    Hyperthyroidism             Physical Exam  General: Well nourished    Airway:  Mallampati: II   Mouth Opening: Normal  Neck ROM: Normal ROM    Dental:  Intact        Anesthesia Plan  Type of Anesthesia, risks & benefits discussed:    Anesthesia Type: Gen Natural Airway  Informed Consent: Informed consent signed with the Patient and all parties understand the risks and agree with anesthesia plan.  All questions answered.   ASA Score: 2    Ready For Surgery From Anesthesia Perspective.     .

## 2024-06-05 NOTE — H&P
Short Stay Endoscopy History and Physical    PCP - Loretta Rg MD    Procedure - Colonoscopy  ASA - per anesthesia  Mallampati - per anesthesia  History of Anesthesia problems - no  Family history Anesthesia problems - no   Plan of anesthesia - General    HPI:  This is a 73 y.o. female here for evaluation of : personal history of colon polyps      ROS:  Constitutional: No fevers, chills, No weight loss  CV: No chest pain  Pulm: No cough, No shortness of breath  GI: see HPI  Derm: No rash    Medical History:  has a past medical history of Allergy, Arthritis, Endometrial cancer (1980), GERD (gastroesophageal reflux disease), Hyperlipidemia, and Thyroid disease.    Surgical History:  has a past surgical history that includes Tonsillectomy; Oophorectomy; Hysterectomy (1980); and Colonoscopy (N/A, 10/26/2017).    Family History: family history is not on file.. Otherwise no colon cancer, inflammatory bowel disease, or GI malignancies.    Social History:  reports that she has never smoked. She has never been exposed to tobacco smoke. She has never used smokeless tobacco. She reports that she does not drink alcohol and does not use drugs.    Review of patient's allergies indicates:   Allergen Reactions    Iodine and iodide containing products      bloating    Sulfa (sulfonamide antibiotics) Nausea And Vomiting       Medications:   Facility-Administered Medications Prior to Admission   Medication Dose Route Frequency Provider Last Rate Last Admin    dexamethasone injection 8 mg  8 mg Intramuscular 1 time in Clinic/HOD Laina Erwin PA-C         Medications Prior to Admission   Medication Sig Dispense Refill Last Dose    brompheniramine-pseudoeph-DM (BROMFED DM) 2-30-10 mg/5 mL Syrp Take 10 mLs by mouth every 4 (four) hours as needed.       fenofibrate 160 MG Tab Take 1 tablet by mouth once daily 90 tablet 1     fexofenadine/pseudoephedrine (ALLERGY RELIEF D ORAL) Take by mouth.       flaxseed oil Oil 1,200  mg by Misc.(Non-Drug; Combo Route) route 2 (two) times daily.       lansoprazole (PREVACID) 15 MG capsule Take 1 capsule (15 mg total) by mouth once daily. (Patient not taking: Reported on 3/7/2024) 90 capsule 3     magnesium oxide 400 mg Cap Take 1 capsule by mouth once.       meclizine (ANTIVERT) 25 mg tablet Take 1 tablet (25 mg total) by mouth 3 (three) times daily as needed for Dizziness. 60 tablet 0     methIMAzole (TAPAZOLE) 5 MG Tab Take 1 tablet by mouth once daily 90 tablet 3     omega-3 fatty acids/fish oil (FISH OIL-OMEGA-3 FATTY ACIDS) 300-1,000 mg capsule Take by mouth once daily.       pantoprazole (PROTONIX) 20 MG tablet Take 1 tablet (20 mg total) by mouth once daily. 90 tablet 3     promethazine (PHENERGAN) 25 MG tablet Take 1 tablet (25 mg total) by mouth every 6 (six) hours as needed for Nausea. 15 tablet 1     rosuvastatin (CRESTOR) 5 MG tablet Take 1 tablet (5 mg total) by mouth once daily. 90 tablet 3          Physical Exam:    Vital Signs: There were no vitals filed for this visit.    General Appearance: Well appearing in no acute distress  Eyes:    No scleral icterus  ENT: Neck supple, Lips, mucosa, and tongue normal; teeth and gums normal  Abdomen: Soft, non tender, non distended with positive bowel sounds. No hepatosplenomegaly, ascites, or mass.  Extremities: 2+ pulses, no clubbing, cyanosis or edema  Skin: No rash      Labs:  Lab Results   Component Value Date    WBC 8.27 04/05/2024    HGB 14.0 04/05/2024    HCT 42.9 04/05/2024     04/05/2024    CHOL 257 (H) 05/12/2023    TRIG 171 (H) 05/12/2023    HDL 55 05/12/2023    ALT 19 04/05/2024    AST 24 04/05/2024     04/05/2024    K 4.5 04/05/2024     04/05/2024    CREATININE 0.7 04/05/2024    BUN 11 04/05/2024    CO2 28 04/05/2024    TSH 0.763 05/12/2023    HGBA1C 5.5 05/12/2023       I have explained the risks and benefits of endoscopy procedures to the patient including but not limited to bleeding, perforation, infection,  and death.  The patient was asked if they understand and allowed to ask any further questions to their satisfaction.    Delbert Bassett MD

## 2024-10-07 DIAGNOSIS — Z12.31 ENCOUNTER FOR SCREENING MAMMOGRAM FOR BREAST CANCER: Primary | ICD-10-CM

## 2024-10-21 ENCOUNTER — HOSPITAL ENCOUNTER (OUTPATIENT)
Dept: RADIOLOGY | Facility: HOSPITAL | Age: 74
Discharge: HOME | End: 2024-10-21
Attending: FAMILY MEDICINE
Payer: MEDICARE

## 2024-10-21 DIAGNOSIS — Z12.31 ENCOUNTER FOR SCREENING MAMMOGRAM FOR BREAST CANCER: ICD-10-CM

## 2024-10-21 PROCEDURE — 77067 SCR MAMMO BI INCL CAD: CPT | Mod: 26,,, | Performed by: RADIOLOGY

## 2024-10-21 PROCEDURE — 77063 BREAST TOMOSYNTHESIS BI: CPT | Mod: TC,PO

## 2024-10-21 PROCEDURE — 77067 SCR MAMMO BI INCL CAD: CPT | Mod: TC,PO

## 2024-10-21 PROCEDURE — 77063 BREAST TOMOSYNTHESIS BI: CPT | Mod: 26,,, | Performed by: RADIOLOGY

## 2025-02-24 DIAGNOSIS — Z00.00 ENCOUNTER FOR MEDICARE ANNUAL WELLNESS EXAM: ICD-10-CM

## 2025-04-08 ENCOUNTER — OFFICE VISIT (OUTPATIENT)
Dept: FAMILY MEDICINE | Facility: CLINIC | Age: 75
End: 2025-04-08
Payer: MEDICARE

## 2025-04-08 ENCOUNTER — HOSPITAL ENCOUNTER (OUTPATIENT)
Dept: RADIOLOGY | Facility: HOSPITAL | Age: 75
Discharge: HOME | End: 2025-04-08
Payer: MEDICARE

## 2025-04-08 VITALS
BODY MASS INDEX: 26.61 KG/M2 | TEMPERATURE: 98 F | HEIGHT: 62 IN | SYSTOLIC BLOOD PRESSURE: 122 MMHG | OXYGEN SATURATION: 97 % | DIASTOLIC BLOOD PRESSURE: 68 MMHG | WEIGHT: 144.63 LBS | HEART RATE: 71 BPM

## 2025-04-08 VITALS
OXYGEN SATURATION: 98 % | BODY MASS INDEX: 26.61 KG/M2 | WEIGHT: 144.63 LBS | SYSTOLIC BLOOD PRESSURE: 138 MMHG | HEIGHT: 62 IN | DIASTOLIC BLOOD PRESSURE: 72 MMHG | HEART RATE: 84 BPM | TEMPERATURE: 98 F

## 2025-04-08 DIAGNOSIS — E78.49 OTHER HYPERLIPIDEMIA: ICD-10-CM

## 2025-04-08 DIAGNOSIS — Z00.00 ENCOUNTER FOR MEDICARE ANNUAL WELLNESS EXAM: Primary | ICD-10-CM

## 2025-04-08 DIAGNOSIS — M25.511 RIGHT SHOULDER PAIN, UNSPECIFIED CHRONICITY: ICD-10-CM

## 2025-04-08 DIAGNOSIS — M25.511 RIGHT SHOULDER PAIN, UNSPECIFIED CHRONICITY: Primary | ICD-10-CM

## 2025-04-08 DIAGNOSIS — K21.9 GERD WITHOUT ESOPHAGITIS: ICD-10-CM

## 2025-04-08 DIAGNOSIS — E05.90 HYPERTHYROIDISM: ICD-10-CM

## 2025-04-08 DIAGNOSIS — Z71.89 ADVANCE DIRECTIVE DISCUSSED WITH PATIENT: ICD-10-CM

## 2025-04-08 DIAGNOSIS — R06.02 SHORTNESS OF BREATH: ICD-10-CM

## 2025-04-08 PROCEDURE — 1158F ADVNC CARE PLAN TLK DOCD: CPT | Mod: HCNC,CPTII,S$GLB,

## 2025-04-08 PROCEDURE — 99214 OFFICE O/P EST MOD 30 MIN: CPT | Mod: HCNC,S$GLB,,

## 2025-04-08 PROCEDURE — 3008F BODY MASS INDEX DOCD: CPT | Mod: HCNC,CPTII,S$GLB,

## 2025-04-08 PROCEDURE — 1101F PT FALLS ASSESS-DOCD LE1/YR: CPT | Mod: HCNC,CPTII,S$GLB,

## 2025-04-08 PROCEDURE — G2211 COMPLEX E/M VISIT ADD ON: HCPCS | Mod: HCNC,S$GLB,,

## 2025-04-08 PROCEDURE — 3288F FALL RISK ASSESSMENT DOCD: CPT | Mod: HCNC,CPTII,S$GLB,

## 2025-04-08 PROCEDURE — 99999 PR PBB SHADOW E&M-EST. PATIENT-LVL V: CPT | Mod: PBBFAC,HCNC,,

## 2025-04-08 PROCEDURE — 99999 PR PBB SHADOW E&M-EST. PATIENT-LVL IV: CPT | Mod: PBBFAC,HCNC,,

## 2025-04-08 PROCEDURE — 3078F DIAST BP <80 MM HG: CPT | Mod: HCNC,CPTII,S$GLB,

## 2025-04-08 PROCEDURE — 1126F AMNT PAIN NOTED NONE PRSNT: CPT | Mod: HCNC,CPTII,S$GLB,

## 2025-04-08 PROCEDURE — 73030 X-RAY EXAM OF SHOULDER: CPT | Mod: 26,HCNC,RT, | Performed by: RADIOLOGY

## 2025-04-08 PROCEDURE — 3074F SYST BP LT 130 MM HG: CPT | Mod: HCNC,CPTII,S$GLB,

## 2025-04-08 PROCEDURE — 73030 X-RAY EXAM OF SHOULDER: CPT | Mod: TC,HCNC,FY,PO,RT

## 2025-04-08 RX ORDER — ROSUVASTATIN CALCIUM 10 MG/1
10 TABLET, COATED ORAL NIGHTLY
Qty: 90 TABLET | Refills: 3 | Status: SHIPPED | OUTPATIENT
Start: 2025-04-08

## 2025-04-08 RX ORDER — PANTOPRAZOLE SODIUM 20 MG/1
20 TABLET, DELAYED RELEASE ORAL DAILY
Qty: 90 TABLET | Refills: 3 | Status: SHIPPED | OUTPATIENT
Start: 2025-04-08

## 2025-04-08 NOTE — ASSESSMENT & PLAN NOTE
Has been out of Crestor 5 for a few months, but lipid panel last year with total cholesterol and triglycerides persistently elevated  -- will increase and refill Crestor to 10 mg.  Continue fenofibrate

## 2025-04-08 NOTE — PROGRESS NOTES
HPI     Chief Complaint:  Chief Complaint   Patient presents with    Medication Refill    Shoulder Injury       Meera Britt is a 74 y.o. female with multiple medical diagnoses as listed in the medical history and problem list that presents for   Chief Complaint   Patient presents with    Medication Refill    Shoulder Injury    .     Patient is know to me with her last appointment with me on Visit date not found.     Pt presents for shoulder pain.  Shoulder Pain   The pain is present in the right shoulder. This is a chronic problem. The current episode started more than 1 month ago. There has been a history of trauma (injured shoulder in October when over extended shoulder). The problem occurs intermittently. The pain is moderate. Associated symptoms include a limited range of motion. She has tried acetaminophen for the symptoms. The treatment provided no relief.     Reports recent episodes of SOB. Extensive family history of heart disease.  Assessment & Plan     Problem List Items Addressed This Visit       Other hyperlipidemia  Stable. Managed with Rosuvastatin. The current medical regimen is effective;  continue present plan and medications.       Other Visit Diagnoses         Right shoulder pain, unspecified chronicity    -  Primary  Right shoulder pain for the past several months after over extending shoulder in October. Will order right shoulder x-ray. Will refer to orthopedics.    Relevant Orders    Ambulatory referral/consult to Orthopedics    X-ray Shoulder 2 or More Views Right      Shortness of breath      Recurrent episodes of SOB and dizziness. Patient with extensive family cardiac history. Will refer to cardiology.    Relevant Orders    Ambulatory referral/consult to Cardiology              --------------------------------------------      Health Maintenance:  Health Maintenance         Date Due Completion Date    RSV Vaccine (Age 60+ and Pregnant patients) (1 - Risk 60-74 years 1-dose series)  "Never done ---    Influenza Vaccine (1) 09/01/2024 10/10/2023    COVID-19 Vaccine (5 - 2024-25 season) 09/01/2024 3/15/2023    Mammogram 10/21/2025 10/21/2024    DEXA Scan 08/22/2026 8/22/2022    Lipid Panel 05/12/2028 5/12/2023    Colorectal Cancer Screening 06/05/2029 6/5/2024    TETANUS VACCINE 09/22/2031 9/22/2021            Health maintenance reviewed    Follow Up:  Follow up in about 5 months (around 9/8/2025).    Exam     Review of Systems:  (as noted above)  Review of Systems    Physical Exam:   Physical Exam  Constitutional:       General: She is not in acute distress.     Appearance: Normal appearance. She is not ill-appearing, toxic-appearing or diaphoretic.   Cardiovascular:      Rate and Rhythm: Normal rate.   Pulmonary:      Effort: Pulmonary effort is normal.   Musculoskeletal:      Right shoulder: Tenderness and bony tenderness present. No swelling, deformity, effusion, laceration or crepitus. Decreased range of motion. Normal strength. Normal pulse.   Neurological:      Mental Status: She is alert.       Vitals:    04/08/25 1316   BP: 122/68   BP Location: Right arm   Patient Position: Sitting   Pulse: 71   Temp: 98.1 °F (36.7 °C)   TempSrc: Oral   SpO2: 97%   Weight: 65.6 kg (144 lb 10 oz)   Height: 5' 2" (1.575 m)      Body mass index is 26.45 kg/m².        History     Past Medical History:  Past Medical History:   Diagnosis Date    Allergy     Arthritis     Endometrial cancer 1980    GERD (gastroesophageal reflux disease)     Hyperlipidemia     Thyroid disease        Past Surgical History:  Past Surgical History:   Procedure Laterality Date    COLONOSCOPY N/A 10/26/2017    Procedure: COLONOSCOPY;  Surgeon: Heladio Gonzalez MD;  Location: [facility] ENDO;  Service: Endoscopy;  Laterality: N/A;    COLONOSCOPY N/A 6/5/2024    Procedure: COLONOSCOPY;  Surgeon: Delbert Bassett MD;  Location: [facility] ENDO;  Service: Endoscopy;  Laterality: N/A;  Ref Dr Bassett  Peg send via portal  5/29-unable to [facility] for " precall-MS  5/29 precall complete -ml    HYSTERECTOMY  1980    OOPHORECTOMY      TONSILLECTOMY         Social History:  Social History[1]    Family History:  No family history on file.    Allergies and Medications: (updated and reviewed)  Review of patient's allergies indicates:   Allergen Reactions    Iodine and iodide containing products      bloating    Sulfa (sulfonamide antibiotics) Nausea And Vomiting     Current Medications[2]    Patient Care Team:  Loretta Rg MD as PCP - General (Family Medicine)  Katharine Peterson LPN as Care Coordinator         - The patient is given an After Visit Summary that lists all medications with directions, allergies, education, orders placed during this encounter and follow-up instructions.      - I have reviewed the patient's medical information including past medical, family, and social history sections including the medications and allergies.      - We discussed the patient's current medications.     This note was created by combination of typed  and MModal dictation.  Transcription errors may be present.  If there are any questions, please contact me.       Mattie Zavaleta NP                      [1]   Social History  Socioeconomic History    Marital status:    Tobacco Use    Smoking status: Never     Passive exposure: Never    Smokeless tobacco: Never   Substance and Sexual Activity    Alcohol use: No    Drug use: Never    Sexual activity: Not Currently     Social Drivers of Health     Financial Resource Strain: Medium Risk (4/8/2025)    Overall Financial Resource Strain (CARDIA)     Difficulty of Paying Living Expenses: Somewhat hard   Food Insecurity: No Food Insecurity (4/8/2025)    Hunger Vital Sign     Worried About Running Out of Food in the Last Year: Never true     Ran Out of Food in the Last Year: Never true   Transportation Needs: No Transportation Needs (4/8/2025)    PRAPARE - Transportation     Lack of Transportation (Medical): No      Lack of Transportation (Non-Medical): No   Physical Activity: Insufficiently Active (4/8/2025)    Exercise Vital Sign     Days of Exercise per Week: 3 days     Minutes of Exercise per Session: 40 min   Stress: No Stress Concern Present (4/8/2025)    [employer] [place] [employer] - Occupational Stress Questionnaire     Feeling of Stress : Not at all   Housing Stability: Low Risk  (4/8/2025)    Housing Stability Vital Sign     Unable to Pay for Housing in the Last Year: No     Number of Times Moved in the Last Year: 1     Homeless in the Last Year: No   [2]   Current Outpatient Medications   Medication Sig Dispense Refill    brompheniramine-pseudoeph-DM (BROMFED DM) 2-30-10 mg/5 mL Syrp Take 10 mLs by mouth every 4 (four) hours as needed.      fenofibrate 160 MG Tab Take 1 tablet by mouth once daily 90 tablet 3    fexofenadine/pseudoephedrine (ALLERGY RELIEF D ORAL) Take by mouth.      magnesium oxide 400 mg Cap Take 1 capsule by mouth once.      meclizine (ANTIVERT) 25 mg tablet Take 1 tablet (25 mg total) by mouth 3 (three) times daily as needed for Dizziness. 60 tablet 0    methIMAzole (TAPAZOLE) 5 MG Tab Take 1 tablet by mouth once daily 90 tablet 3    pantoprazole (PROTONIX) 20 MG tablet Take 1 tablet (20 mg total) by mouth once daily. 90 tablet 3    rosuvastatin (CRESTOR) 10 MG tablet Take 1 tablet (10 mg total) by mouth every evening. 90 tablet 3     No current facility-administered medications for this visit.

## 2025-04-08 NOTE — PATIENT INSTRUCTIONS
Counseling and Referral of Other Preventative  (Italic type indicates deductible and co-insurance are waived)    Patient Name: Meera Britt  Today's Date: 4/8/2025    Fiber Supplementation for High Cholesterol  --  Supplemental fiber intake can increase satiety (especially when taken with or right before meals), lower cholesterol, and improve glucose homeostasis.  It is also somewhat protective against colorectal cancer, and promotes regularity.  --   Vegetables, which are generally high in fiber and low in calories (root vegetables generally being the exception)  --   Fiber Supplements (some options):        ** Supplemental fiber should be accompanied by ample hydration (about 2.5-3 liters of water per day at least) **     -   Ground Flax Seed:  Flax is a gentle (modest) source of fiber which is Not likely to cause bloating.  Try 1 tbsp of ground flax seed with a shake/smoothie or added to oatmeal.  Nutty flavor, affordable, well-tolerated.    -   Psyllium Husk:  Excellent source of fiber, but start slowly!  Start with about 1 tsp per day taken with a glass (8oz) of water, and slowly increase to 1-3 tbsp per day before or with meals as tolerated.   -   Citrucel is another gentle fiber alternative to psyllium husk, and is safe in severe kidney disease (stages 4-5)  -- Avocados are incredibly high in both fiber & healthy fats - ripe when slightly soft - toss in the fridge as soon as they begin to ripen     GERD  Tums after last meal of the day is not a bad idea to help control reflux    Health Maintenance         Date Due Completion Date    RSV Vaccine (Age 60+ and Pregnant patients) (1 - Risk 60-74 years 1-dose series) Never done ---    Influenza Vaccine (1) 09/01/2024 10/10/2023    COVID-19 Vaccine (5 - 2024-25 season) 09/01/2024 3/15/2023    Mammogram 10/21/2025 10/21/2024    DEXA Scan 08/22/2026 8/22/2022    Lipid Panel 05/12/2028 5/12/2023    Colorectal Cancer Screening 06/05/2029 6/5/2024    TETANUS VACCINE  09/22/2031 9/22/2021          No orders of the defined types were placed in this encounter.    The following information is provided to all patients.  This information is to help you find resources for any of the problems found today that may be affecting your health:                  Living healthy guide: www.[facility].[place].[facility]      Understanding Diabetes: www.diabetes.org      Eating healthy: www.cdc.gov/healthyweight      CDC home safety checklist: www.cdc.gov/steadi/patient.html      Agency on Aging: www.goea.[place].[facility]      Alcoholics anonymous (AA): www.aa.org      Physical Activity: www.floyd.nih.gov/te9fhps      Tobacco use: www.quitwithusla.org

## 2025-04-08 NOTE — PROGRESS NOTES
"  Meera Britt presented for a follow-up Medicare AWV today. The following components were reviewed and updated:    Medical history  Family History  Social history  Allergies and Current Medications  Health Risk Assessment  Health Maintenance  Care Team    **See Completed Assessments for Annual Wellness visit with in the encounter summary    The following assessments were completed:  Depression Screening  Cognitive function Screening  Timed Get Up Test  Whisper Test      Opioid documentation:      Patient does not have a current opioid prescription.          Vitals:    04/08/25 1349   BP: 138/72   Pulse: 84   Temp: 98 °F (36.7 °C)   TempSrc: Oral   SpO2: 98%   Weight: 65.6 kg (144 lb 10 oz)   Height: 5' 2" (1.575 m)     Body mass index is 26.45 kg/m².        Physical Exam   Vital signs reviewed.   Body mass index is 26.45 kg/m².   General:  Well-developed, well-nourished. NAD.   Skin:  Warm, dry.   Eyes:  Conjunctivae w/o exudates or hemorrhage.  Non-icteric sclerae.    Heart:  Normal S1 & S2.  No extra heart sounds.    Lungs:  CTAB without rales, rhonchi or wheezing.  Breathing comfortably on RA.  Extremities:  Radial pulses 2+ and symmetric  Neuro:  A&O4.  No obvious focal deficits. Negative Dillard's sign.    Psychiatric:  Appropriate affect.    Clock:       Assessment & Plan     Encounter for Medicare annual wellness exam  Pt was seen today for an Annual Wellness visit.  Healthcare maintenance and screening recommendations were discussed and updated as indicated.  Patient asked to return in one year for routine AWV.  -- PCP f/u scheduled for 9/2025 - we will schedule labs prior to this appointment    Advance directive discussed with patient  I offered to discuss advanced care planning, including how to pick a person who would make decisions for you if you were unable to make them for yourself, called a health care power of , and what kind of decisions you might make such as use of life sustaining " treatments such as ventilators and tube feeding when faced with a life limiting illness recorded on a living will that they will need to know. (How you want to be cared for as you near the end of your natural life)  -- Patient is interested in learning more about how to make advanced directives.  I provided them paperwork and discussed the rationale and logistics of its completion and return at next OV.        Other hyperlipidemia  Has been out of Crestor 5 for a few months, but lipid panel last year with total cholesterol and triglycerides persistently elevated  -- will increase and refill Crestor to 10 mg.  Continue fenofibrate  Orders:  -     rosuvastatin (CRESTOR) 10 MG tablet; Take 1 tablet (10 mg total) by mouth every evening.  Dispense: 90 tablet; Refill: 3  -     CBC Auto Differential; Future; Expected date: 04/08/2025  -     Comprehensive Metabolic Panel; Future; Expected date: 04/08/2025  -     Lipid Panel; Future; Expected date: 04/08/2025      Hyperthyroidism  PMH of hyperthyroidism, on methimazole 5 mg, most recent labs 1 year ago with normal TSH.  Will order recheck today.   Orders:  -     TSH; Future; Expected date: 04/08/2025  -     T4, Free; Future; Expected date: 04/08/2025      GERD without esophagitis  Reports that the slightest deviation from tolerable diet gives her reflux despite pantoprazole 20 q.a.m. AC.  Discussed lifestyle modifications to improve symptoms and recommended Tums PRN PC for additional relief  Orders:  -     pantoprazole (PROTONIX) 20 MG tablet; Take 1 tablet (20 mg total) by mouth once daily.  Dispense: 90 tablet; Refill: 3            Provided Meera with a 5-10 year written screening schedule and personal prevention plan. Recommendations were developed using the USPSTF age appropriate recommendations. Education, counseling, and referrals were provided as needed.  After Visit Summary printed and given to patient which includes a list of additional screenings\tests  needed.    No follow-ups on file.      Kolby Clifton PA-C

## 2025-04-09 ENCOUNTER — RESULTS FOLLOW-UP (OUTPATIENT)
Dept: FAMILY MEDICINE | Facility: CLINIC | Age: 75
End: 2025-04-09

## 2025-04-10 ENCOUNTER — OFFICE VISIT (OUTPATIENT)
Dept: SPORTS MEDICINE | Facility: CLINIC | Age: 75
End: 2025-04-10
Payer: MEDICARE

## 2025-04-10 ENCOUNTER — HOSPITAL ENCOUNTER (OUTPATIENT)
Dept: RADIOLOGY | Facility: HOSPITAL | Age: 75
Discharge: HOME | End: 2025-04-10
Attending: STUDENT IN AN ORGANIZED HEALTH CARE EDUCATION/TRAINING PROGRAM
Payer: MEDICARE

## 2025-04-10 VITALS
DIASTOLIC BLOOD PRESSURE: 85 MMHG | BODY MASS INDEX: 26.68 KG/M2 | HEART RATE: 61 BPM | WEIGHT: 145 LBS | SYSTOLIC BLOOD PRESSURE: 153 MMHG | HEIGHT: 62 IN

## 2025-04-10 DIAGNOSIS — M75.101 ROTATOR CUFF SYNDROME OF RIGHT SHOULDER: Primary | ICD-10-CM

## 2025-04-10 DIAGNOSIS — M25.511 RIGHT SHOULDER PAIN, UNSPECIFIED CHRONICITY: ICD-10-CM

## 2025-04-10 PROCEDURE — 73030 X-RAY EXAM OF SHOULDER: CPT | Mod: TC,HCNC,RT

## 2025-04-10 PROCEDURE — 3079F DIAST BP 80-89 MM HG: CPT | Mod: CPTII,S$GLB,, | Performed by: STUDENT IN AN ORGANIZED HEALTH CARE EDUCATION/TRAINING PROGRAM

## 2025-04-10 PROCEDURE — 99999 PR PBB SHADOW E&M-EST. PATIENT-LVL IV: CPT | Mod: PBBFAC,,, | Performed by: STUDENT IN AN ORGANIZED HEALTH CARE EDUCATION/TRAINING PROGRAM

## 2025-04-10 PROCEDURE — 1160F RVW MEDS BY RX/DR IN RCRD: CPT | Mod: CPTII,S$GLB,, | Performed by: STUDENT IN AN ORGANIZED HEALTH CARE EDUCATION/TRAINING PROGRAM

## 2025-04-10 PROCEDURE — 1159F MED LIST DOCD IN RCRD: CPT | Mod: CPTII,S$GLB,, | Performed by: STUDENT IN AN ORGANIZED HEALTH CARE EDUCATION/TRAINING PROGRAM

## 2025-04-10 PROCEDURE — 99204 OFFICE O/P NEW MOD 45 MIN: CPT | Mod: S$GLB,,, | Performed by: STUDENT IN AN ORGANIZED HEALTH CARE EDUCATION/TRAINING PROGRAM

## 2025-04-10 PROCEDURE — 1125F AMNT PAIN NOTED PAIN PRSNT: CPT | Mod: CPTII,S$GLB,, | Performed by: STUDENT IN AN ORGANIZED HEALTH CARE EDUCATION/TRAINING PROGRAM

## 2025-04-10 PROCEDURE — 3008F BODY MASS INDEX DOCD: CPT | Mod: CPTII,S$GLB,, | Performed by: STUDENT IN AN ORGANIZED HEALTH CARE EDUCATION/TRAINING PROGRAM

## 2025-04-10 PROCEDURE — 73030 X-RAY EXAM OF SHOULDER: CPT | Mod: 26,RT,, | Performed by: RADIOLOGY

## 2025-04-10 PROCEDURE — 3077F SYST BP >= 140 MM HG: CPT | Mod: CPTII,S$GLB,, | Performed by: STUDENT IN AN ORGANIZED HEALTH CARE EDUCATION/TRAINING PROGRAM

## 2025-04-10 NOTE — PROGRESS NOTES
Subjective:          Chief Complaint: Meera Britt is a 74 y.o. female who had concerns including Pain of the Right Shoulder.    History of Present Illness    CHIEF COMPLAINT:  - Right shoulder pain and weakness    HPI:  Client presents with right shoulder pain that began in October when she was lifting corn in a field. She reports hearing and feeling a pop in her right shoulder while lifting and twisting overhead. Pain has remained constant over the past 5-6 months, located in the front of the shoulder. She rates it as 1-2 out of 10 when at rest, increasing to 7/10 pain at its worst, particularly when overexerting. She reports difficulty reaching up. She has been self-managing her condition, continuing to lift 50-pound bags of corn despite the pain. OTC pain medications such as ibuprofen and Tylenol have been ineffective in managing her pain. She also reports weakness in the right shoulder and difficulty with certain movements, including having to fasten her bra in front instead of reaching behind. She denies sleep disturbances due to the pain. 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  - She reports being the primary weight  in the household due to her 's disability  - She has been lifting 50-pound bags of corn and putting them on a four-tellez  - She reports continuing to perform tasks despite pain  - She reports difficulty reaching up  - She has had to adjust how she puts on her bra due to shoulder issues, now fastening it in front instead of reaching behind        Past Medical History:   Diagnosis Date    Allergy     Arthritis      Endometrial cancer 1980    GERD (gastroesophageal reflux disease)     Hyperlipidemia     Thyroid disease        Medications Ordered Prior to Encounter[1]    Past Surgical History:   Procedure Laterality Date    COLONOSCOPY N/A 10/26/2017    Procedure: COLONOSCOPY;  Surgeon: Heladio Gonzalez MD;  Location: [facility] ENDO;  Service: Endoscopy;  Laterality: N/A;    COLONOSCOPY N/A 6/5/2024    Procedure: COLONOSCOPY;  Surgeon: Delbert Bassett MD;  Location: [facility] ENDO;  Service: Endoscopy;  Laterality: N/A;  Ref Dr Bassett  Peg send via portal  5/29-unable to [facility] for precall-MS  5/29 precall complete -ml    HYSTERECTOMY  1980    OOPHORECTOMY      TONSILLECTOMY         No family history on file.    Social History[2]   Review of Systems   Constitutional: Negative.   HENT: Negative.     Eyes: Negative.    Cardiovascular: Negative.    Respiratory: Negative.     Endocrine: Negative.    Hematologic/Lymphatic: Negative.    Skin: Negative.    Musculoskeletal:  Positive for joint pain, muscle weakness and stiffness.   Neurological: Negative.    Psychiatric/Behavioral: Negative.     Allergic/Immunologic: Negative.        Pain Related Questions  Over the past 3 days, what was your average pain during activity? (I.e. running, jogging, walking, climbing stairs, getting dressed, ect.): 7  Over the past 3 days, what was your highest pain level?: 7  Over the past 3 days, what was your lowest pain level? : 1    Other  How many nights a week are you awakened by your affected body part?: 0  Was the patient's HEIGHT measured or patient reported?: Patient Reported  Was the patient's WEIGHT measured or patient reported?: Measured      Objective:        General: Meera is well-developed, well-nourished, appears stated age, in no acute distress, alert and oriented to time, place and person.     General    Nursing note and vitals reviewed.  Constitutional: She is oriented to person, place, and time. She appears well-developed and well-nourished. No  distress.   HENT:   Head: Normocephalic and atraumatic.   Nose: Nose normal.   Eyes: EOM are normal.   Cardiovascular:  Intact distal pulses.            Pulmonary/Chest: Effort normal. No respiratory distress.   Neurological: She is alert and oriented to person, place, and time.   Psychiatric: She has a normal mood and affect. Her behavior is normal. Judgment and thought content normal.         Right Shoulder Exam     Inspection/Observation   Swelling: absent  Bruising: absent  Scars: absent  Deformity: absent  Scapular Winging: absent  Scapular Dyskinesia: negative  Atrophy: absent    Tenderness   The patient is tender to palpation of the greater tuberosity and biceps tendon.    Range of Motion   Active abduction:  110   Passive abduction:  160   Forward Flexion:  110   Forward Elevation: 110  External Rotation 0 degrees:  30   Internal rotation 0 degrees:  Mid thoracic     Tests & Signs   Cross arm: positive  Galindo test: positive  Impingement: positive  Rotator Cuff Painful Arc/Range: moderate  Lift Off Sign: negative  Belly Press: positive  Active Compression Test (Thurmond's Sign): positive  Speed's Test: positive  Bear Hug: negative    Other   Sensation: normal    Left Shoulder Exam   Left shoulder exam is normal.    Inspection/Observation   Swelling: absent  Bruising: absent  Scars: absent  Deformity: absent  Scapular Winging: absent  Scapular Dyskinesia: negative  Atrophy: absent    Tenderness   The patient is experiencing no tenderness.     Range of Motion   Active abduction:  170   Passive abduction:  170   Forward Flexion:  180   Forward Elevation: 180  External Rotation 0 degrees:  60   Internal rotation 0 degrees:  Mid thoracic     Other   Sensation: normal       Muscle Strength   Right Upper Extremity   Shoulder Abduction: 4/5   Shoulder Internal Rotation: 4/5   Shoulder External Rotation: 4/5   Supraspinatus: 4/5   Subscapularis: 4/5   Biceps: 5/5   Left Upper Extremity  Shoulder Abduction: 5/5    Shoulder Internal Rotation: 5/5   Shoulder External Rotation: 5/5   Supraspinatus: 5/5   Subscapularis: 5/5   Biceps: 5/5     Vascular Exam     Right Pulses      Radial:                    2+      Left Pulses      Radial:                    2+      Capillary Refill  Right Hand: normal capillary refill  Left Hand: normal capillary refill            Imaging:   X-rays of the right shoulder from 04/10/2025 personally viewed by me on that day.  These include AP, Grashey, scapular Y, axillary.  Glenohumeral joints.  No fracture.  Severe arthritic changes in the AC joint, mild in the glenohumeral joint.  No other bony abnormality.      Assessment:   Meera Britt is a 74 y.o. female with right shoulder pain concerning for traumatic full-thickness rotator cuff tear  1. Rotator cuff syndrome of right shoulder  MRI Shoulder Without Contrast Right      2. Right shoulder pain, unspecified chronicity  Ambulatory referral/consult to Orthopedics    X-ray Shoulder 2 or More Views Right         Plan:       Assessment & Plan    IMAGING:  - Ordered MRI Right Shoulder to evaluate for rotator cuff tear.    PROCEDURES:  - # Procedures    FOLLOW UP:  - Follow up after imaging study.         This note was generated with the assistance of ambient listening technology. Verbal consent was obtained by the patient and accompanying visitor(s) for the recording of patient appointment to facilitate this note. I attest to having reviewed and edited the generated note for accuracy, though some syntax or spelling errors may persist. Please contact the author of this note for any clarification.                         [1]   Current Outpatient Medications on File Prior to Visit   Medication Sig Dispense Refill    brompheniramine-pseudoeph-DM (BROMFED DM) 2-30-10 mg/5 mL Syrp Take 10 mLs by mouth every 4 (four) hours as needed.      fenofibrate 160 MG Tab Take 1 tablet by mouth once daily 90 tablet 3    fexofenadine/pseudoephedrine (ALLERGY RELIEF  D ORAL) Take by mouth.      magnesium oxide 400 mg Cap Take 1 capsule by mouth once.      meclizine (ANTIVERT) 25 mg tablet Take 1 tablet (25 mg total) by mouth 3 (three) times daily as needed for Dizziness. 60 tablet 0    methIMAzole (TAPAZOLE) 5 MG Tab Take 1 tablet by mouth once daily 90 tablet 3    pantoprazole (PROTONIX) 20 MG tablet Take 1 tablet (20 mg total) by mouth once daily. 90 tablet 3    rosuvastatin (CRESTOR) 10 MG tablet Take 1 tablet (10 mg total) by mouth every evening. 90 tablet 3     No current facility-administered medications on file prior to visit.   [2]   Social History  Socioeconomic History    Marital status:    Tobacco Use    Smoking status: Never     Passive exposure: Never    Smokeless tobacco: Never   Substance and Sexual Activity    Alcohol use: No    Drug use: Never    Sexual activity: Not Currently     Social Drivers of Health     Financial Resource Strain: Medium Risk (4/8/2025)    Overall Financial Resource Strain (CARDIA)     Difficulty of Paying Living Expenses: Somewhat hard   Food Insecurity: No Food Insecurity (4/8/2025)    Hunger Vital Sign     Worried About Running Out of Food in the Last Year: Never true     Ran Out of Food in the Last Year: Never true   Transportation Needs: No Transportation Needs (4/8/2025)    PRAPARE - Transportation     Lack of Transportation (Medical): No     Lack of Transportation (Non-Medical): No   Physical Activity: Insufficiently Active (4/8/2025)    Exercise Vital Sign     Days of Exercise per Week: 3 days     Minutes of Exercise per Session: 40 min   Stress: No Stress Concern Present (4/8/2025)    [employer] - Occupational Stress Questionnaire     Feeling of Stress : Not at all   Housing Stability: Low Risk  (4/8/2025)    Housing Stability Vital Sign     Unable to Pay for Housing in the Last Year: No     Number of Times Moved in the Last Year: 1     Homeless in the Last Year: No

## 2025-04-17 ENCOUNTER — HOSPITAL ENCOUNTER (OUTPATIENT)
Dept: RADIOLOGY | Facility: HOSPITAL | Age: 75
Discharge: HOME | End: 2025-04-17
Attending: STUDENT IN AN ORGANIZED HEALTH CARE EDUCATION/TRAINING PROGRAM
Payer: MEDICARE

## 2025-04-17 DIAGNOSIS — M75.101 ROTATOR CUFF SYNDROME OF RIGHT SHOULDER: ICD-10-CM

## 2025-04-17 PROCEDURE — 73221 MRI JOINT UPR EXTREM W/O DYE: CPT | Mod: 26,RT,, | Performed by: RADIOLOGY

## 2025-04-17 PROCEDURE — 73221 MRI JOINT UPR EXTREM W/O DYE: CPT | Mod: TC,RT

## 2025-04-22 ENCOUNTER — OFFICE VISIT (OUTPATIENT)
Dept: SPORTS MEDICINE | Facility: CLINIC | Age: 75
End: 2025-04-22
Payer: MEDICARE

## 2025-04-22 VITALS
SYSTOLIC BLOOD PRESSURE: 137 MMHG | DIASTOLIC BLOOD PRESSURE: 82 MMHG | WEIGHT: 143.94 LBS | HEIGHT: 62 IN | HEART RATE: 59 BPM | BODY MASS INDEX: 26.49 KG/M2

## 2025-04-22 DIAGNOSIS — M75.21 BICEPS TENDINITIS OF RIGHT UPPER EXTREMITY: ICD-10-CM

## 2025-04-22 DIAGNOSIS — M19.011 ARTHRITIS OF RIGHT ACROMIOCLAVICULAR JOINT: ICD-10-CM

## 2025-04-22 DIAGNOSIS — S46.011A TRAUMATIC COMPLETE TEAR OF RIGHT ROTATOR CUFF, INITIAL ENCOUNTER: Primary | ICD-10-CM

## 2025-04-22 PROCEDURE — 99215 OFFICE O/P EST HI 40 MIN: CPT | Mod: S$GLB,,, | Performed by: STUDENT IN AN ORGANIZED HEALTH CARE EDUCATION/TRAINING PROGRAM

## 2025-04-22 PROCEDURE — 1160F RVW MEDS BY RX/DR IN RCRD: CPT | Mod: CPTII,S$GLB,, | Performed by: STUDENT IN AN ORGANIZED HEALTH CARE EDUCATION/TRAINING PROGRAM

## 2025-04-22 PROCEDURE — 3288F FALL RISK ASSESSMENT DOCD: CPT | Mod: CPTII,S$GLB,, | Performed by: STUDENT IN AN ORGANIZED HEALTH CARE EDUCATION/TRAINING PROGRAM

## 2025-04-22 PROCEDURE — 3075F SYST BP GE 130 - 139MM HG: CPT | Mod: CPTII,S$GLB,, | Performed by: STUDENT IN AN ORGANIZED HEALTH CARE EDUCATION/TRAINING PROGRAM

## 2025-04-22 PROCEDURE — 99999 PR PBB SHADOW E&M-EST. PATIENT-LVL III: CPT | Mod: PBBFAC,,, | Performed by: STUDENT IN AN ORGANIZED HEALTH CARE EDUCATION/TRAINING PROGRAM

## 2025-04-22 PROCEDURE — 1159F MED LIST DOCD IN RCRD: CPT | Mod: CPTII,S$GLB,, | Performed by: STUDENT IN AN ORGANIZED HEALTH CARE EDUCATION/TRAINING PROGRAM

## 2025-04-22 PROCEDURE — 1125F AMNT PAIN NOTED PAIN PRSNT: CPT | Mod: CPTII,S$GLB,, | Performed by: STUDENT IN AN ORGANIZED HEALTH CARE EDUCATION/TRAINING PROGRAM

## 2025-04-22 PROCEDURE — 3079F DIAST BP 80-89 MM HG: CPT | Mod: CPTII,S$GLB,, | Performed by: STUDENT IN AN ORGANIZED HEALTH CARE EDUCATION/TRAINING PROGRAM

## 2025-04-22 PROCEDURE — 1101F PT FALLS ASSESS-DOCD LE1/YR: CPT | Mod: CPTII,S$GLB,, | Performed by: STUDENT IN AN ORGANIZED HEALTH CARE EDUCATION/TRAINING PROGRAM

## 2025-04-22 PROCEDURE — 3008F BODY MASS INDEX DOCD: CPT | Mod: CPTII,S$GLB,, | Performed by: STUDENT IN AN ORGANIZED HEALTH CARE EDUCATION/TRAINING PROGRAM

## 2025-04-23 ENCOUNTER — TELEPHONE (OUTPATIENT)
Dept: FAMILY MEDICINE | Facility: CLINIC | Age: 75
End: 2025-04-23
Payer: MEDICARE

## 2025-04-23 NOTE — TELEPHONE ENCOUNTER
----- Message from Loretta Rg MD sent at 4/23/2025  9:12 AM CDT -----  Regarding: FW: Pre Op Clearance  Please double book 8:40 AM on May 7 for a preop appt. Dr. Rg  ----- Message -----  From: Steve Amador MD  Sent: 4/22/2025  12:46 PM CDT  To: Loretta Rg MD  Subject: Pre Op Clearance                                 Dariusz Burgos,Ms Britt has a massive rotator cuff tear and we are going to plan on surgical repair on 05/16.  Do you mind getting her in for a preop clearance visit?  Thanks, Steve

## 2025-04-24 ENCOUNTER — TELEPHONE (OUTPATIENT)
Dept: FAMILY MEDICINE | Facility: CLINIC | Age: 75
End: 2025-04-24
Payer: MEDICARE

## 2025-05-02 ENCOUNTER — PATIENT MESSAGE (OUTPATIENT)
Dept: PREADMISSION TESTING | Facility: HOSPITAL | Age: 75
End: 2025-05-02
Payer: MEDICARE

## 2025-05-02 DIAGNOSIS — E05.90 HYPERTHYROIDISM: ICD-10-CM

## 2025-05-02 RX ORDER — METHIMAZOLE 5 MG/1
5 TABLET ORAL
Qty: 90 TABLET | Refills: 0 | Status: SHIPPED | OUTPATIENT
Start: 2025-05-02

## 2025-05-02 NOTE — TELEPHONE ENCOUNTER
Care Due:                  Date            Visit Type   Department     Provider  --------------------------------------------------------------------------------                                EP [facility] MED                              PRIMARY      / INTERNAL MED  Last Visit: 04-      CARE ([facility])   / PEDS         Moody Arriaza                                           [facility]                                           / INTERNAL MED  Next Visit: 05-      PRE-OP       / PEDS         Loretta Antoine                                                            Last  Test          Frequency    Reason                     Performed    Due Date  --------------------------------------------------------------------------------    CBC.........  12 months..  fenofibrate..............  04- 03-    CMP.........  12 months..  fenofibrate..............  04- 03-    Lipid Panel.  12 months..  fenofibrate..............  05- 05-    Health [facility] Embedded Care Due Messages. Reference number: [id number].   5/02/2025 8:15:54 AM CDT

## 2025-05-02 NOTE — ANESTHESIA PAT ROS NOTE
2025  Meera Britt is a 74 y.o., female.      Pre-op Assessment    I have reviewed the Patient Summary Reports.       I have reviewed the Medications.     Review of Systems  Anesthesia Hx:  No problems with previous Anesthesia   History of prior surgery of interest to airway management or planning:  Previous anesthesia: MAC       Colonoscopy 24 with MAC.  Procedure performed at an [facility].    Denies Personal Hx of Anesthesia complications.                    Social:  No Alcohol Use, Non-Smoker       Hematology/Oncology:       -- Denies Anemia:               Hematology Comments: Vitamin D deficiency      --  Cancer in past history:              surgery   Oncology Comments: H/O Endometrial cancer, S/P Hysterectomy, Oophorectomy     EENT/Dental:   Seasonal allergies,  H/O Tonsillectomy,  Benign paroxysmal positional vertigo,          Cardiovascular:  Exercise tolerance: good      Denies MI.     Denies CABG/stent.    Denies Angina.     hyperlipidemia  Denies SHULTZ.  ECG has been reviewed. Significant family history of premature coronary disease, Mother  at age 44,  Cardiology appt 2025 for recent c/o SOB,  Saw Cardiology last in  Patient not on beta blockers                          Pulmonary:    Denies COPD.  Denies Asthma.  Shortness of breath (referred to Cards)   Recurrent episodes of SOB and dizziness,  extensive family cardiac history, Referral to Cardiology placed by PCP, appt 25               Renal/:  Renal/ Normal                 Hepatic/GI:     GERD    Not Taking GLP-1 Agonists            Musculoskeletal:  Arthritis   Traumatic complete tear of right rotator cuff,   Biceps tendinitis of right upper extremity  Arthritis of right acromioclavicular joint              OB/GYN/PEDS:  Hysterectomy, Oophorectomy            Neurological:  Neurology Normal   Denies CVA.     Denies Seizures.                                Endocrine:  Denies Diabetes.  Hyperthyroidism         Dermatological:  Skin Normal    Psych:  Psychiatric Normal                   Past Medical History:   Diagnosis Date    Allergy     Arthritis     Endometrial cancer 1980    GERD (gastroesophageal reflux disease)     Hyperlipidemia     Thyroid disease      Past Surgical History:   Procedure Laterality Date    COLONOSCOPY N/A 10/26/2017    Procedure: COLONOSCOPY;  Surgeon: Heladio Gonzalez MD;  Location: [facility] ENDO;  Service: Endoscopy;  Laterality: N/A;    COLONOSCOPY N/A 6/5/2024    Procedure: COLONOSCOPY;  Surgeon: Delbert Bassett MD;  Location: [facility] ENDO;  Service: Endoscopy;  Laterality: N/A;  Ref Dr Bassett  Peg send via portal  5/29-unable to [facility] for precall-MS  5/29 precall complete -ml    HYSTERECTOMY  1980    OOPHORECTOMY      TONSILLECTOMY         Anesthesia Assessment: Preoperative EQUATION    Planned Procedure: Procedure(s) (LRB):  ARTHROSCOPY, SHOULDER, W/ ROTATOR CUFF REPAIR (Right)  ARTHROSCOPY, SHOULDER, W/ BICEPS TENODESIS (Right)  ARTHROSCOPY, SHOULDER, W/ DISTAL CLAVICLE EXCISION (Right)  Requested Anesthesia Type:Regional  Surgeon: Steve Amador MD  Service: Orthopedics  Known or anticipated Date of Surgery:5/16/2025    Surgeon notes: reviewed    Electronic QUestionnaire Assessment completed via nurse interview with patient.        Triage considerations:     The patient has no apparent active cardiac condition (No unstable coronary Syndrome such as severe unstable angina or recent [<1 month] myocardial infarction, decompensated CHF, severe valvular   disease or significant arrhythmia)    Previous anesthesia records:MAC and No problems    Last PCP note: within 1 month , within [facility]   Subspecialty notes: Gastroenterology, Gyn/ONC, Ortho    Other important co-morbidities: GERD, HLD, and hyperthyroid       EKG 4/5/2024:  Vent. Rate : 066 BPM     Atrial Rate : 066 BPM      P-R Int : 182 ms           QRS Dur : 074 ms       QT Int : 444 ms       P-R-T Axes : 043 055 042 degrees      QTc Int : 465 ms   Normal sinus rhythm   Nonspecific ST and T wave abnormality   Abnormal ECG   When compared with ECG of 05-MAR-2021 10:02,   No significant change was found   Confirmed by Saurabh Harmon MD ([id number]) on 4/8/2024 9:27:02 PM       Exercise Nuclear Stress Test 3/5/2021:  Interpretation Summary  Show Result Comparison     Normal myocardial perfusion scan. There is no evidence of myocardial ischemia or infarction.    There is a mild intensity defect in the anterior wall of the left ventricle, secondary to breast attenuation.    The gated perfusion images showed an ejection fraction of 70% at rest.    There is normal wall motion at rest and post stress.    The EKG portion of this study is negative for ischemia.    There were no arrhythmias during stress.      Echo 3/5/2021:    Summary  Show Result ComparisonThe left ventricle is normal in size with mild concentric hypertrophy and normal systolic function. The estimated ejection fraction is 60%  Mild aortic regurgitation.  Grade I left ventricular diastolic dysfunction.  Normal right ventricular size with normal right ventricular systolic function.  Mild left atrial enlargement.  Mild tricuspid regurgitation.  Normal central venous pressure (3 mmHg).  The estimated PA systolic pressure is 22 mmHg.       Tests already available:  Results have been reviewed.            Instructions given. (See in Nurse's note) Preop medication instructions sent via portal message.    Optimization:  Anesthesia Preop Clinic Assessment Not Indicated    Medical Opinion Indicated: Yes, PCP       Sub-specialist consult indicated:Cardiology, appt 5/12/25      Plan:   Consultation:Patient's PCP for a statement of optimization     Patient has previously scheduled Medical Appointment: 5/7/2025    Navigation: Patient is medically optimized by PCP. May proceed with surgery.  Per Cardiology,  ""Preoperative cardiovascular evaluation: Patient can proceed. Functional capacity > 4 METS. RCRI 3.9% 30 day risk of death, MI or cardiac arrest."        Ht: 5'2  Wt:65.3 kg (143 lb)  BMI: 26.33    "

## 2025-05-06 ENCOUNTER — OFFICE VISIT (OUTPATIENT)
Dept: SPORTS MEDICINE | Facility: CLINIC | Age: 75
End: 2025-05-06
Payer: MEDICARE

## 2025-05-06 VITALS
WEIGHT: 141.63 LBS | HEART RATE: 80 BPM | HEIGHT: 62 IN | DIASTOLIC BLOOD PRESSURE: 64 MMHG | SYSTOLIC BLOOD PRESSURE: 100 MMHG | BODY MASS INDEX: 26.06 KG/M2

## 2025-05-06 DIAGNOSIS — S46.011A TRAUMATIC COMPLETE TEAR OF RIGHT ROTATOR CUFF, INITIAL ENCOUNTER: Primary | ICD-10-CM

## 2025-05-06 DIAGNOSIS — M75.21 BICEPS TENDINITIS OF RIGHT UPPER EXTREMITY: ICD-10-CM

## 2025-05-06 PROCEDURE — 99999 PR PBB SHADOW E&M-EST. PATIENT-LVL III: CPT | Mod: PBBFAC,HCNC,, | Performed by: ORTHOPAEDIC SURGERY

## 2025-05-06 PROCEDURE — 99499 UNLISTED E&M SERVICE: CPT | Mod: HCNC,S$GLB,, | Performed by: ORTHOPAEDIC SURGERY

## 2025-05-06 RX ORDER — SODIUM CHLORIDE 9 MG/ML
INJECTION, SOLUTION INTRAVENOUS CONTINUOUS
OUTPATIENT
Start: 2025-05-06

## 2025-05-06 NOTE — H&P (VIEW-ONLY)
Meera Britt  is here for a completion of her perioperative paperwork. she  Is scheduled to undergo:    Right shoulder   Arthroscopy   Rotator cuff repair versus debridement, possible open  Subacromial decompression   Arthroscopic biceps tenodesis   Distal clavicle excision   All other indicated procedures     on 05/16/2025.  She is a healthy individual and does need clearance for this procedure.     Risks, indications and benefits of the surgical procedure were discussed with the patient. All questions with regard to surgery, rehab, expected return to functional activities, activities of daily living and recreational endeavors were answered to her satisfaction.    Discussed COVID-19 with the patient, they are aware of our current policies and procedures, were given the option of delaying surgery, and they elect to proceed.    Patient was informed and understands the risks of surgery are greater for patients with a current condition or history of heart disease, obesity, clotting disorders, recurrent infections, steroid use, current or past smoking, and factors such as sedentary lifestyle and noncompliance with medications, therapy or follow-up. The degree of the increased risk is hard to estimate with any degree of precision.    Once no other questions were asked, a brief history and physical exam was then performed.    PAST MEDICAL HISTORY:   Past Medical History:   Diagnosis Date    Allergy     Arthritis     Endometrial cancer 1980    GERD (gastroesophageal reflux disease)     Hyperlipidemia     Thyroid disease      PAST SURGICAL HISTORY:   Past Surgical History:   Procedure Laterality Date    COLONOSCOPY N/A 10/26/2017    Procedure: COLONOSCOPY;  Surgeon: Heladio Gonzalez MD;  Location: [facility];  Service: Endoscopy;  Laterality: N/A;    COLONOSCOPY N/A 6/5/2024    Procedure: COLONOSCOPY;  Surgeon: Delbert Bassett MD;  Location: [facility] ENDO;  Service: Endoscopy;  Laterality: N/A;  Ref Dr Bassett  Peg send  via portal  5/29-unable to lvm for precall-MS  5/29 precall complete -ml    HYSTERECTOMY  1980    OOPHORECTOMY      TONSILLECTOMY       FAMILY HISTORY: No family history on file.  SOCIAL HISTORY: Social History[1]    MEDICATIONS: Current Medications[2]  ALLERGIES:   Review of patient's allergies indicates:   Allergen Reactions    Iodine and iodide containing products      bloating    Sulfa (sulfonamide antibiotics) Nausea And Vomiting       Review of Systems   Constitution: Negative. Negative for chills, fever and night sweats.   HENT: Negative for congestion and headaches.    Eyes: Negative for blurred vision, left vision loss and right vision loss.   Cardiovascular: Negative for chest pain and syncope.   Respiratory: Negative for cough and shortness of breath.    Endocrine: Negative for polydipsia, polyphagia and polyuria.   Hematologic/Lymphatic: Negative for bleeding problem. Does not bruise/bleed easily.   Skin: Negative for dry skin, itching and rash.   Musculoskeletal: Negative for falls and muscle weakness.   Gastrointestinal: Negative for abdominal pain and bowel incontinence.   Genitourinary: Negative for bladder incontinence and nocturia.   Neurological: Negative for disturbances in coordination, loss of balance and seizures.   Psychiatric/Behavioral: Negative for depression. The patient does not have insomnia.    Allergic/Immunologic: Negative for hives and persistent infections.     PHYSICAL EXAM:  GEN: A&Ox3, WD WN NAD  HEENT: WNL  CHEST: CTAB, no W/R/R  HEART: RRR, no M/R/G   ABD: Soft, NT ND, BS x4 QUADS  MS: Refer to previous note for detailed MS exam  NEURO: CN II-XII intact       The surgical consent was then reviewed with the patient, who agreed with all the contents of the consent form and it was signed. she was instructed to wait for a phone call from the anesthesia department prior to surgery to discuss past medical history, medications, and clearance. Also, informed she may be required to get  additional testing per the anesthesia department prior to having surgery.     PHYSICAL THERAPY:  She was also instructed regarding physical therapy and will begin POD # 1-3. She is doing physical therapy at [facility].    POST OP CARE:instructions were reviewed including care of the wound and dressing after surgery and when she can shower.     PAIN MANAGEMENT: Meera Britt was also given a pain management regime, which includes the TENS unit given to her by [employer] Alcantar along with the education required for its use. She was also instructed regarding the Polar ice unit that will be in place after surgery and her postoperative pain medications.     PAIN MEDICATION:  Roxicodone (Oxycodone) 5 mg po q 4-6 hours prn pain   Phenergan 25 mg one p.o. q.4-6 hours prn nausea and vomiting.  Celebrex 200 mg BID with meals  Robaxin 500mg q 8 hours prn pain    Post op meds to be delivered bedside prior to discharge. Deliver to family if patient is in surgery at 5pm.   Patient denies history of seizures.     Patient will also use bilateral TEDs on lower extremities, SCDs during surgery, and early ambulation post-op. If the patient was previously taking 81mg baby aspirin, they were told to not take it will using the above stated aspirin and to restart the 81mg aspirin after completion of the aspirin dose.       Patient was also told to buy over the counter Prilosec medication and take it once daily for GI protection as long as they are taking NSAIDs or Aspirin.    DVT prophylaxis was discussed with the patient today including risk factors for developing DVTs and history of DVTs. The patient was asked if any specific recommendations were given from the doctor/s that did pre-operative surgical clearance.      The patient was told that narcotic pain medications may make them drowsy and instructions were given to not sign legal documents, drive or operate heavy machinery, cars, or equipment while  under the influence of narcotic medications.     My supervising physician Steve Amador MD was also present during the appointment.  He discussed with the patient all the potential complications, risks, and benefits of their upcoming surgery.    As there were no other questions to be asked, she was given my business card along with Steve Torres MD business card if she has any questions or concerns prior to surgery or in the postop period.          [1]   Social History  Socioeconomic History    Marital status:    Tobacco Use    Smoking status: Never     Passive exposure: Never    Smokeless tobacco: Never   Substance and Sexual Activity    Alcohol use: No    Drug use: Never    Sexual activity: Not Currently     Social Drivers of Health     Financial Resource Strain: Medium Risk (4/8/2025)    Overall Financial Resource Strain (CARDIA)     Difficulty of Paying Living Expenses: Somewhat hard   Food Insecurity: No Food Insecurity (4/8/2025)    Hunger Vital Sign     Worried About Running Out of Food in the Last Year: Never true     Ran Out of Food in the Last Year: Never true   Transportation Needs: No Transportation Needs (4/8/2025)    PRAPARE - Transportation     Lack of Transportation (Medical): No     Lack of Transportation (Non-Medical): No   Physical Activity: Insufficiently Active (4/8/2025)    Exercise Vital Sign     Days of Exercise per Week: 3 days     Minutes of Exercise per Session: 40 min   Stress: No Stress Concern Present (4/8/2025)    [employer] - Occupational Stress Questionnaire     Feeling of Stress : Not at all   Housing Stability: Low Risk  (4/8/2025)    Housing Stability Vital Sign     Unable to Pay for Housing in the Last Year: No     Number of Times Moved in the Last Year: 1     Homeless in the Last Year: No   [2]   Current Outpatient Medications:     brompheniramine-pseudoeph-DM (BROMFED DM) 2-30-10 mg/5 mL Syrp, Take 10 mLs by mouth every 4 (four) hours as  needed., Disp: , Rfl:     fenofibrate 160 MG Tab, Take 1 tablet by mouth once daily, Disp: 90 tablet, Rfl: 3    fexofenadine/pseudoephedrine (ALLERGY RELIEF D ORAL), Take by mouth., Disp: , Rfl:     magnesium oxide 400 mg Cap, Take 1 capsule by mouth once., Disp: , Rfl:     meclizine (ANTIVERT) 25 mg tablet, Take 1 tablet (25 mg total) by mouth 3 (three) times daily as needed for Dizziness., Disp: 60 tablet, Rfl: 0    methIMAzole (TAPAZOLE) 5 MG Tab, Take 1 tablet by mouth once daily, Disp: 90 tablet, Rfl: 0    pantoprazole (PROTONIX) 20 MG tablet, Take 1 tablet (20 mg total) by mouth once daily., Disp: 90 tablet, Rfl: 3    rosuvastatin (CRESTOR) 10 MG tablet, Take 1 tablet (10 mg total) by mouth every evening., Disp: 90 tablet, Rfl: 3

## 2025-05-06 NOTE — H&P
Meera Britt  is here for a completion of her perioperative paperwork. she  Is scheduled to undergo:    Right shoulder   Arthroscopy   Rotator cuff repair versus debridement, possible open  Subacromial decompression   Arthroscopic biceps tenodesis   Distal clavicle excision   All other indicated procedures     on 05/16/2025.  She is a healthy individual and does need clearance for this procedure.     Risks, indications and benefits of the surgical procedure were discussed with the patient. All questions with regard to surgery, rehab, expected return to functional activities, activities of daily living and recreational endeavors were answered to her satisfaction.    Discussed COVID-19 with the patient, they are aware of our current policies and procedures, were given the option of delaying surgery, and they elect to proceed.    Patient was informed and understands the risks of surgery are greater for patients with a current condition or history of heart disease, obesity, clotting disorders, recurrent infections, steroid use, current or past smoking, and factors such as sedentary lifestyle and noncompliance with medications, therapy or follow-up. The degree of the increased risk is hard to estimate with any degree of precision.    Once no other questions were asked, a brief history and physical exam was then performed.    PAST MEDICAL HISTORY:   Past Medical History:   Diagnosis Date    Allergy     Arthritis     Endometrial cancer 1980    GERD (gastroesophageal reflux disease)     Hyperlipidemia     Thyroid disease      PAST SURGICAL HISTORY:   Past Surgical History:   Procedure Laterality Date    COLONOSCOPY N/A 10/26/2017    Procedure: COLONOSCOPY;  Surgeon: Heladio Gonzalez MD;  Location: [facility];  Service: Endoscopy;  Laterality: N/A;    COLONOSCOPY N/A 6/5/2024    Procedure: COLONOSCOPY;  Surgeon: Delbert Bassett MD;  Location: [facility] ENDO;  Service: Endoscopy;  Laterality: N/A;  Ref Dr Bassett  Peg send  via portal  5/29-unable to lvm for precall-MS  5/29 precall complete -ml    HYSTERECTOMY  1980    OOPHORECTOMY      TONSILLECTOMY       FAMILY HISTORY: No family history on file.  SOCIAL HISTORY: Social History[1]    MEDICATIONS: Current Medications[2]  ALLERGIES:   Review of patient's allergies indicates:   Allergen Reactions    Iodine and iodide containing products      bloating    Sulfa (sulfonamide antibiotics) Nausea And Vomiting       Review of Systems   Constitution: Negative. Negative for chills, fever and night sweats.   HENT: Negative for congestion and headaches.    Eyes: Negative for blurred vision, left vision loss and right vision loss.   Cardiovascular: Negative for chest pain and syncope.   Respiratory: Negative for cough and shortness of breath.    Endocrine: Negative for polydipsia, polyphagia and polyuria.   Hematologic/Lymphatic: Negative for bleeding problem. Does not bruise/bleed easily.   Skin: Negative for dry skin, itching and rash.   Musculoskeletal: Negative for falls and muscle weakness.   Gastrointestinal: Negative for abdominal pain and bowel incontinence.   Genitourinary: Negative for bladder incontinence and nocturia.   Neurological: Negative for disturbances in coordination, loss of balance and seizures.   Psychiatric/Behavioral: Negative for depression. The patient does not have insomnia.    Allergic/Immunologic: Negative for hives and persistent infections.     PHYSICAL EXAM:  GEN: A&Ox3, WD WN NAD  HEENT: WNL  CHEST: CTAB, no W/R/R  HEART: RRR, no M/R/G   ABD: Soft, NT ND, BS x4 QUADS  MS: Refer to previous note for detailed MS exam  NEURO: CN II-XII intact       The surgical consent was then reviewed with the patient, who agreed with all the contents of the consent form and it was signed. she was instructed to wait for a phone call from the anesthesia department prior to surgery to discuss past medical history, medications, and clearance. Also, informed she may be required to get  additional testing per the anesthesia department prior to having surgery.     PHYSICAL THERAPY:  She was also instructed regarding physical therapy and will begin POD # 1-3. She is doing physical therapy at [facility].    POST OP CARE:instructions were reviewed including care of the wound and dressing after surgery and when she can shower.     PAIN MANAGEMENT: Meera Britt was also given a pain management regime, which includes the TENS unit given to her by [employer] Alc[employer] along with the education required for its use. She was also instructed regarding the Polar ice unit that will be in place after surgery and her postoperative pain medications.     PAIN MEDICATION:  Roxicodone (Oxycodone) 5 mg po q 4-6 hours prn pain   Phenergan 25 mg one p.o. q.4-6 hours prn nausea and vomiting.  Celebrex 200 mg BID with meals  Robaxin 500mg q 8 hours prn pain    Post op meds to be delivered bedside prior to discharge. Deliver to family if patient is in surgery at 5pm.   Patient denies history of seizures.     Patient will also use bilateral TEDs on lower extremities, SCDs during surgery, and early ambulation post-op. If the patient was previously taking 81mg baby aspirin, they were told to not take it will using the above stated aspirin and to restart the 81mg aspirin after completion of the aspirin dose.       Patient was also told to buy over the counter Prilosec medication and take it once daily for GI protection as long as they are taking NSAIDs or Aspirin.    DVT prophylaxis was discussed with the patient today including risk factors for developing DVTs and history of DVTs. The patient was asked if any specific recommendations were given from the doctor/s that did pre-operative surgical clearance.      The patient was told that narcotic pain medications may make them drowsy and instructions were given to not sign legal documents, drive or operate heavy machinery, cars, or equipment while  under the influence of narcotic medications.     My supervising physician Steve Amador MD was also present during the appointment.  He discussed with the patient all the potential complications, risks, and benefits of their upcoming surgery.    As there were no other questions to be asked, she was given my business card along with Steve Torres MD business card if she has any questions or concerns prior to surgery or in the postop period.          [1]   Social History  Socioeconomic History    Marital status:    Tobacco Use    Smoking status: Never     Passive exposure: Never    Smokeless tobacco: Never   Substance and Sexual Activity    Alcohol use: No    Drug use: Never    Sexual activity: Not Currently     Social Drivers of Health     Financial Resource Strain: Medium Risk (4/8/2025)    Overall Financial Resource Strain (CARDIA)     Difficulty of Paying Living Expenses: Somewhat hard   Food Insecurity: No Food Insecurity (4/8/2025)    Hunger Vital Sign     Worried About Running Out of Food in the Last Year: Never true     Ran Out of Food in the Last Year: Never true   Transportation Needs: No Transportation Needs (4/8/2025)    PRAPARE - Transportation     Lack of Transportation (Medical): No     Lack of Transportation (Non-Medical): No   Physical Activity: Insufficiently Active (4/8/2025)    Exercise Vital Sign     Days of Exercise per Week: 3 days     Minutes of Exercise per Session: 40 min   Stress: No Stress Concern Present (4/8/2025)    [employer] - Occupational Stress Questionnaire     Feeling of Stress : Not at all   Housing Stability: Low Risk  (4/8/2025)    Housing Stability Vital Sign     Unable to Pay for Housing in the Last Year: No     Number of Times Moved in the Last Year: 1     Homeless in the Last Year: No   [2]   Current Outpatient Medications:     brompheniramine-pseudoeph-DM (BROMFED DM) 2-30-10 mg/5 mL Syrp, Take 10 mLs by mouth every 4 (four) hours as  needed., Disp: , Rfl:     fenofibrate 160 MG Tab, Take 1 tablet by mouth once daily, Disp: 90 tablet, Rfl: 3    fexofenadine/pseudoephedrine (ALLERGY RELIEF D ORAL), Take by mouth., Disp: , Rfl:     magnesium oxide 400 mg Cap, Take 1 capsule by mouth once., Disp: , Rfl:     meclizine (ANTIVERT) 25 mg tablet, Take 1 tablet (25 mg total) by mouth 3 (three) times daily as needed for Dizziness., Disp: 60 tablet, Rfl: 0    methIMAzole (TAPAZOLE) 5 MG Tab, Take 1 tablet by mouth once daily, Disp: 90 tablet, Rfl: 0    pantoprazole (PROTONIX) 20 MG tablet, Take 1 tablet (20 mg total) by mouth once daily., Disp: 90 tablet, Rfl: 3    rosuvastatin (CRESTOR) 10 MG tablet, Take 1 tablet (10 mg total) by mouth every evening., Disp: 90 tablet, Rfl: 3

## 2025-05-07 ENCOUNTER — OFFICE VISIT (OUTPATIENT)
Dept: FAMILY MEDICINE | Facility: CLINIC | Age: 75
End: 2025-05-07
Payer: MEDICARE

## 2025-05-07 VITALS
TEMPERATURE: 98 F | BODY MASS INDEX: 26.09 KG/M2 | HEART RATE: 70 BPM | HEIGHT: 62 IN | OXYGEN SATURATION: 98 % | SYSTOLIC BLOOD PRESSURE: 118 MMHG | DIASTOLIC BLOOD PRESSURE: 70 MMHG | WEIGHT: 141.75 LBS

## 2025-05-07 DIAGNOSIS — Z01.818 PRE-OP EVALUATION: Primary | ICD-10-CM

## 2025-05-07 DIAGNOSIS — E05.90 HYPERTHYROIDISM: ICD-10-CM

## 2025-05-07 DIAGNOSIS — E78.49 OTHER HYPERLIPIDEMIA: ICD-10-CM

## 2025-05-07 PROCEDURE — 3074F SYST BP LT 130 MM HG: CPT | Mod: CPTII,HCNC,S$GLB, | Performed by: FAMILY MEDICINE

## 2025-05-07 PROCEDURE — 1160F RVW MEDS BY RX/DR IN RCRD: CPT | Mod: CPTII,HCNC,S$GLB, | Performed by: FAMILY MEDICINE

## 2025-05-07 PROCEDURE — 3078F DIAST BP <80 MM HG: CPT | Mod: CPTII,HCNC,S$GLB, | Performed by: FAMILY MEDICINE

## 2025-05-07 PROCEDURE — 3008F BODY MASS INDEX DOCD: CPT | Mod: CPTII,HCNC,S$GLB, | Performed by: FAMILY MEDICINE

## 2025-05-07 PROCEDURE — 3288F FALL RISK ASSESSMENT DOCD: CPT | Mod: CPTII,HCNC,S$GLB, | Performed by: FAMILY MEDICINE

## 2025-05-07 PROCEDURE — 1101F PT FALLS ASSESS-DOCD LE1/YR: CPT | Mod: CPTII,HCNC,S$GLB, | Performed by: FAMILY MEDICINE

## 2025-05-07 PROCEDURE — 1159F MED LIST DOCD IN RCRD: CPT | Mod: CPTII,HCNC,S$GLB, | Performed by: FAMILY MEDICINE

## 2025-05-07 PROCEDURE — 1126F AMNT PAIN NOTED NONE PRSNT: CPT | Mod: CPTII,HCNC,S$GLB, | Performed by: FAMILY MEDICINE

## 2025-05-07 PROCEDURE — 99215 OFFICE O/P EST HI 40 MIN: CPT | Mod: HCNC,S$GLB,, | Performed by: FAMILY MEDICINE

## 2025-05-07 PROCEDURE — 99999 PR PBB SHADOW E&M-EST. PATIENT-LVL III: CPT | Mod: PBBFAC,HCNC,, | Performed by: FAMILY MEDICINE

## 2025-05-07 RX ORDER — PROMETHAZINE HYDROCHLORIDE 25 MG/1
25 TABLET ORAL EVERY 6 HOURS PRN
Qty: 30 TABLET | Refills: 0 | Status: SHIPPED | OUTPATIENT
Start: 2025-05-07

## 2025-05-07 RX ORDER — CELECOXIB 200 MG/1
200 CAPSULE ORAL 2 TIMES DAILY WITH MEALS
Qty: 60 CAPSULE | Refills: 0 | Status: SHIPPED | OUTPATIENT
Start: 2025-05-07

## 2025-05-07 RX ORDER — OXYCODONE HYDROCHLORIDE 5 MG/1
5 TABLET ORAL EVERY 6 HOURS PRN
Qty: 28 TABLET | Refills: 0 | Status: SHIPPED | OUTPATIENT
Start: 2025-05-07

## 2025-05-07 RX ORDER — METHOCARBAMOL 500 MG/1
500 TABLET, FILM COATED ORAL 3 TIMES DAILY PRN
Qty: 30 TABLET | Refills: 0 | Status: SHIPPED | OUTPATIENT
Start: 2025-05-07

## 2025-05-08 ENCOUNTER — PATIENT MESSAGE (OUTPATIENT)
Dept: PREADMISSION TESTING | Facility: HOSPITAL | Age: 75
End: 2025-05-08
Payer: MEDICARE

## 2025-05-09 NOTE — PROGRESS NOTES
Assessment & Plan  Problem List Items Addressed This Visit          Cardiac/Vascular    Other hyperlipidemia       Endocrine    Hyperthyroidism     Other Visit Diagnoses         Pre-op evaluation    -  Primary           Assessment & Plan  1. Preoperative evaluation for rotator cuff surgery.  - She has been deemed fit for the upcoming rotator cuff surgery.  - The surgical procedure is expected to proceed without complications, given her overall good health status.  - She is advised to keep her appointment with the cardiologist on 05/12/2025.  - Encouraged to maintain a positive outlook and high pain tolerance for recovery.    Medically optimized.  May proceed with surgery     2. Bilateral hearing loss.  - Reports difficulty hearing, particularly with high-frequency sounds such as whispers.  - Physical exam findings indicate clear ears bilaterally.  - Further evaluation may be needed to determine the cause and appropriate treatment.  - Consider referral to audiology for comprehensive hearing assessment.    3. Thyroid management.  - A prescription refill for methimazole has been sent to pharmacy.  - She is scheduled for thyroid blood work in 07/2025 to monitor her condition.  - Previous blood work indicated no prediabetes.  - Plan to review thyroid function tests and adjust treatment as necessary.    Results        Health Maintenance reviewed.      ______________________________________________________________________    Chief Complaint  Chief Complaint   Patient presents with    Pre-op Exam       HPI  Meera Britt is a 74 y.o. female with multiple medical diagnoses as listed in the medical history and problem list that presents for pre op exam.  Pt is known to me with last appointment 10/10/2023 .    History of Present Illness  The patient presents for a preoperative evaluation for rotator cuff surgery.    She has been advised to maintain her scheduled appointment with the cardiologist on 05/12/2025.    She has  been experiencing bilateral hearing loss, which was initially identified during a whisper test. She reports that she was unable to perceive any sound during the test.    She has called in for a refill on methimazole for her thyroid. Blood work is scheduled for 07/2025, with a follow-up appointment in 09/2025. It has been a while since she had thyroid blood work.    She was previously tested for prediabetes, but a follow-up blood test showed no prediabetes.           PAST MEDICAL HISTORY:  Past Medical History:   Diagnosis Date    Allergy     Arthritis     Endometrial cancer 1980    GERD (gastroesophageal reflux disease)     Hyperlipidemia     Thyroid disease        PAST SURGICAL HISTORY:  Past Surgical History:   Procedure Laterality Date    COLONOSCOPY N/A 10/26/2017    Procedure: COLONOSCOPY;  Surgeon: Heladio Gonzalez MD;  Location: [facility] ENDO;  Service: Endoscopy;  Laterality: N/A;    COLONOSCOPY N/A 6/5/2024    Procedure: COLONOSCOPY;  Surgeon: Delbert Bassett MD;  Location: [facility] ENDO;  Service: Endoscopy;  Laterality: N/A;  Ref Dr Bassett  Peg send via portal  5/29-unable to [facility] for precall-MS  5/29 precall complete -ml    HYSTERECTOMY  1980    OOPHORECTOMY      TONSILLECTOMY         SOCIAL HISTORY:  Social History[1]    FAMILY HISTORY:  Family History   Problem Relation Name Age of Onset    Heart attack Mother         ALLERGIES AND MEDICATIONS: updated and reviewed.  Review of patient's allergies indicates:   Allergen Reactions    Iodine and iodide containing products      bloating    Sulfa (sulfonamide antibiotics) Nausea And Vomiting     Current Medications[2]      ROS  Review of Systems   Constitutional:  Negative for activity change, appetite change, fatigue, fever and unexpected weight change.   HENT: Negative.  Negative for ear discharge, ear pain, rhinorrhea and sore throat.    Eyes: Negative.    Respiratory:  Negative for apnea, cough, chest tightness, shortness of breath and wheezing.   "  Cardiovascular:  Negative for chest pain, palpitations and leg swelling.   Gastrointestinal:  Negative for abdominal distention, abdominal pain, constipation, diarrhea and vomiting.   Endocrine: Negative for cold intolerance, heat intolerance, polydipsia and polyuria.   Genitourinary:  Negative for decreased urine volume and urgency.   Musculoskeletal: Negative.    Skin:  Negative for rash.   Neurological:  Negative for dizziness and headaches.   Hematological:  Does not bruise/bleed easily.   Psychiatric/Behavioral:  Negative for agitation, sleep disturbance and suicidal ideas.            Physical Exam  Vitals:    05/07/25 0900   BP: 118/70   Pulse: 70   Temp: 98.4 °F (36.9 °C)   TempSrc: Oral   SpO2: 98%   Weight: 64.3 kg (141 lb 12.1 oz)   Height: 5' 2" (1.575 m)    Body mass index is 25.93 kg/m².  Weight: 64.3 kg (141 lb 12.1 oz)   Height: 5' 2" (157.5 cm)   Physical Exam  Vitals reviewed.   Constitutional:       Appearance: Normal appearance. She is well-developed.   HENT:      Head: Normocephalic and atraumatic.      Right Ear: External ear normal.      Left Ear: External ear normal.      Nose: Nose normal.      Mouth/Throat:      Mouth: Mucous membranes are moist.      Pharynx: Oropharynx is clear.   Eyes:      Extraocular Movements: Extraocular movements intact.      Conjunctiva/sclera: Conjunctivae normal.      Pupils: Pupils are equal, round, and reactive to light.   Cardiovascular:      Rate and Rhythm: Normal rate and regular rhythm.      Heart sounds: Normal heart sounds.   Pulmonary:      Effort: Pulmonary effort is normal.      Breath sounds: Normal breath sounds.   Skin:     General: Skin is warm and dry.   Neurological:      Mental Status: She is alert and oriented to person, place, and time.        Physical Exam  Ears: External ear canals and tympanic membranes intact         Health Maintenance         Date Due Completion Date    RSV Vaccine (Age 60+ and Pregnant patients) (1 - Risk 60-74 years " 1-dose series) Never done ---    COVID-19 Vaccine (5 - 2024-25 season) 09/01/2024 3/15/2023    Influenza Vaccine (Season Ended) 09/01/2025 10/10/2023    Mammogram 10/21/2025 10/21/2024    DEXA Scan 08/22/2026 8/22/2022    Lipid Panel 05/12/2028 5/12/2023    Colorectal Cancer Screening 06/05/2029 6/5/2024    TETANUS VACCINE 09/22/2031 9/22/2021                Patient note was created using [employer].  Any errors in syntax or even information may not have been identified and edited on initial review prior to signing this note.       [1]   Social History  Socioeconomic History    Marital status:    Tobacco Use    Smoking status: Never     Passive exposure: Never    Smokeless tobacco: Never   Substance and Sexual Activity    Alcohol use: No    Drug use: Never    Sexual activity: Not Currently     Social Drivers of Health     Financial Resource Strain: Medium Risk (4/8/2025)    Overall Financial Resource Strain (CARDIA)     Difficulty of Paying Living Expenses: Somewhat hard   Food Insecurity: No Food Insecurity (4/8/2025)    Hunger Vital Sign     Worried About Running Out of Food in the Last Year: Never true     Ran Out of Food in the Last Year: Never true   Transportation Needs: No Transportation Needs (4/8/2025)    PRAPARE - Transportation     Lack of Transportation (Medical): No     Lack of Transportation (Non-Medical): No   Physical Activity: Insufficiently Active (4/8/2025)    Exercise Vital Sign     Days of Exercise per Week: 3 days     Minutes of Exercise per Session: 40 min   Stress: No Stress Concern Present (4/8/2025)    [place] [employer] - Occupational Stress Questionnaire     Feeling of Stress : Not at all   Housing Stability: Low Risk  (4/8/2025)    Housing Stability Vital Sign     Unable to Pay for Housing in the Last Year: No     Number of Times Moved in the Last Year: 1     Homeless in the Last Year: No   [2]   Current Outpatient Medications   Medication Sig Dispense Refill     brompheniramine-pseudoeph-DM (BROMFED DM) 2-30-10 mg/5 mL Syrp Take 10 mLs by mouth every 4 (four) hours as needed.      celecoxib (CELEBREX) 200 MG capsule Take 1 capsule (200 mg total) by mouth 2 (two) times daily with meals. 60 capsule 0    fenofibrate 160 MG Tab Take 1 tablet by mouth once daily 90 tablet 3    fexofenadine/pseudoephedrine (ALLERGY RELIEF D ORAL) Take by mouth.      magnesium oxide 400 mg Cap Take 1 capsule by mouth once.      meclizine (ANTIVERT) 25 mg tablet Take 1 tablet (25 mg total) by mouth 3 (three) times daily as needed for Dizziness. 60 tablet 0    methIMAzole (TAPAZOLE) 5 MG Tab Take 1 tablet by mouth once daily 90 tablet 0    methocarbamoL (ROBAXIN) 500 MG Tab Take 1 tablet (500 mg total) by mouth 3 (three) times daily as needed (muscle spasms). 30 tablet 0    oxyCODONE (ROXICODONE) 5 MG immediate release tablet Take 1 tablet (5 mg total) by mouth every 6 (six) hours as needed for Pain. 28 tablet 0    pantoprazole (PROTONIX) 20 MG tablet Take 1 tablet (20 mg total) by mouth once daily. 90 tablet 3    promethazine (PHENERGAN) 25 MG tablet Take 1 tablet (25 mg total) by mouth every 6 (six) hours as needed for Nausea. 30 tablet 0    rosuvastatin (CRESTOR) 10 MG tablet Take 1 tablet (10 mg total) by mouth every evening. 90 tablet 3     No current facility-administered medications for this visit.

## 2025-05-12 ENCOUNTER — OFFICE VISIT (OUTPATIENT)
Dept: CARDIOLOGY | Facility: CLINIC | Age: 75
End: 2025-05-12
Payer: MEDICARE

## 2025-05-12 VITALS
RESPIRATION RATE: 18 BRPM | HEART RATE: 62 BPM | OXYGEN SATURATION: 96 % | SYSTOLIC BLOOD PRESSURE: 134 MMHG | BODY MASS INDEX: 26.21 KG/M2 | HEIGHT: 62 IN | WEIGHT: 142.44 LBS | DIASTOLIC BLOOD PRESSURE: 66 MMHG

## 2025-05-12 DIAGNOSIS — R00.2 PALPITATIONS: ICD-10-CM

## 2025-05-12 DIAGNOSIS — Z82.49 FAMILY HISTORY OF PREMATURE CORONARY ARTERY DISEASE: ICD-10-CM

## 2025-05-12 DIAGNOSIS — Z01.810 PREOPERATIVE CARDIOVASCULAR EXAMINATION: Primary | ICD-10-CM

## 2025-05-12 DIAGNOSIS — E78.49 OTHER HYPERLIPIDEMIA: ICD-10-CM

## 2025-05-12 PROCEDURE — 3008F BODY MASS INDEX DOCD: CPT | Mod: CPTII,HCNC,S$GLB, | Performed by: INTERNAL MEDICINE

## 2025-05-12 PROCEDURE — 1101F PT FALLS ASSESS-DOCD LE1/YR: CPT | Mod: CPTII,HCNC,S$GLB, | Performed by: INTERNAL MEDICINE

## 2025-05-12 PROCEDURE — 99999 PR PBB SHADOW E&M-EST. PATIENT-LVL V: CPT | Mod: PBBFAC,HCNC,, | Performed by: INTERNAL MEDICINE

## 2025-05-12 PROCEDURE — 1159F MED LIST DOCD IN RCRD: CPT | Mod: CPTII,HCNC,S$GLB, | Performed by: INTERNAL MEDICINE

## 2025-05-12 PROCEDURE — 3075F SYST BP GE 130 - 139MM HG: CPT | Mod: CPTII,HCNC,S$GLB, | Performed by: INTERNAL MEDICINE

## 2025-05-12 PROCEDURE — 3078F DIAST BP <80 MM HG: CPT | Mod: CPTII,HCNC,S$GLB, | Performed by: INTERNAL MEDICINE

## 2025-05-12 PROCEDURE — 99203 OFFICE O/P NEW LOW 30 MIN: CPT | Mod: HCNC,S$GLB,, | Performed by: INTERNAL MEDICINE

## 2025-05-12 PROCEDURE — 1160F RVW MEDS BY RX/DR IN RCRD: CPT | Mod: CPTII,HCNC,S$GLB, | Performed by: INTERNAL MEDICINE

## 2025-05-12 PROCEDURE — 93000 ELECTROCARDIOGRAM COMPLETE: CPT | Mod: HCNC,S$GLB,, | Performed by: INTERNAL MEDICINE

## 2025-05-12 PROCEDURE — 3288F FALL RISK ASSESSMENT DOCD: CPT | Mod: CPTII,HCNC,S$GLB, | Performed by: INTERNAL MEDICINE

## 2025-05-12 PROCEDURE — 1126F AMNT PAIN NOTED NONE PRSNT: CPT | Mod: CPTII,HCNC,S$GLB, | Performed by: INTERNAL MEDICINE

## 2025-05-12 NOTE — PROGRESS NOTES
CARDIOLOGY CLINIC VISIT        HISTORY OF PRESENT ILLNESS:     03/31/2021:  Meera Britt presents for continued care.  Seen 02/26/2021 for evaluation of shortness of breath.  Significant family history of premature coronary disease.  Echocardiogram showed normal left ventricular systolic function.  Normal pulmonary pressure.  Nuclear stress was negative for ischemia or infarction.  Patient exercised for 5 minutes 40 seconds.  Peak blood pressure 205/113.    05/12/2025: During hunting season noticed palpitations. She felt a correlation to excessive caffeine intake. She stopped and symptoms resolved. Denies any chest pain or shortness of breath. Plans for rotator cuff surgery this upcoming Friday. EKG shows normal sinus rhythm. Functional capacity > 4 METS.    CARDIOVASCULAR HISTORY:     None    PAST MEDICAL HISTORY:     Past Medical History:   Diagnosis Date    Allergy     Arthritis     Endometrial cancer 1980    GERD (gastroesophageal reflux disease)     Hyperlipidemia     Thyroid disease        PAST SURGICAL HISTORY:     Past Surgical History:   Procedure Laterality Date    COLONOSCOPY N/A 10/26/2017    Procedure: COLONOSCOPY;  Surgeon: Heladio Gonzalez MD;  Location: [facility] ENDO;  Service: Endoscopy;  Laterality: N/A;    COLONOSCOPY N/A 6/5/2024    Procedure: COLONOSCOPY;  Surgeon: Delbert Bassett MD;  Location: [facility] ENDO;  Service: Endoscopy;  Laterality: N/A;  Ref Dr Bassett  Peg send via portal  5/29-unable to [facility] for precall-MS  5/29 precall complete -ml    HYSTERECTOMY  1980    OOPHORECTOMY      TONSILLECTOMY         ALLERGIES AND MEDICATION:     Review of patient's allergies indicates:   Allergen Reactions    Iodine and iodide containing products      bloating    Sulfa (sulfonamide antibiotics) Nausea And Vomiting        Medication List            Accurate as of May 12, 2025  9:27 AM. If you have any questions, ask your nurse or doctor.                CONTINUE taking these medications      ALLERGY RELIEF D  ORAL     brompheniramine-pseudoeph-DM 2-30-10 mg/5 mL Syrp  Commonly known as: BROMFED DM     celecoxib 200 MG capsule  Commonly known as: CeleBREX  Take 1 capsule (200 mg total) by mouth 2 (two) times daily with meals.     fenofibrate 160 MG Tab  Take 1 tablet by mouth once daily     magnesium oxide 400 mg magnesium Cap     meclizine 25 mg tablet  Commonly known as: ANTIVERT  Take 1 tablet (25 mg total) by mouth 3 (three) times daily as needed for Dizziness.     methIMAzole 5 MG Tab  Commonly known as: TAPAZOLE  Take 1 tablet by mouth once daily     methocarbamoL 500 MG Tab  Commonly known as: Robaxin  Take 1 tablet (500 mg total) by mouth 3 (three) times daily as needed (muscle spasms).     oxyCODONE 5 MG immediate release tablet  Commonly known as: ROXICODONE  Take 1 tablet (5 mg total) by mouth every 6 (six) hours as needed for Pain.     pantoprazole 20 MG tablet  Commonly known as: PROTONIX  Take 1 tablet (20 mg total) by mouth once daily.     promethazine 25 MG tablet  Commonly known as: PHENERGAN  Take 1 tablet (25 mg total) by mouth every 6 (six) hours as needed for Nausea.     rosuvastatin 10 MG tablet  Commonly known as: CRESTOR  Take 1 tablet (10 mg total) by mouth every evening.              SOCIAL HISTORY:     Social History     Socioeconomic History    Marital status:    Tobacco Use    Smoking status: Never     Passive exposure: Never    Smokeless tobacco: Never   Substance and Sexual Activity    Alcohol use: No    Drug use: Never    Sexual activity: Not Currently     Social Drivers of Health     Financial Resource Strain: Medium Risk (4/8/2025)    Overall Financial Resource Strain (CARDIA)     Difficulty of Paying Living Expenses: Somewhat hard   Food Insecurity: No Food Insecurity (4/8/2025)    Hunger Vital Sign     Worried About Running Out of Food in the Last Year: Never true     Ran Out of Food in the Last Year: Never true   Transportation Needs: No Transportation Needs (4/8/2025)     PRAPARE - Transportation     Lack of Transportation (Medical): No     Lack of Transportation (Non-Medical): No   Physical Activity: Insufficiently Active (4/8/2025)    Exercise Vital Sign     Days of Exercise per Week: 3 days     Minutes of Exercise per Session: 40 min   Stress: No Stress Concern Present (4/8/2025)    [employer]izen [employer] [place] [employer] Occupational [employer] - Occupational Stress Questionnaire     Feeling of Stress : Not at all   Housing Stability: Low Risk  (4/8/2025)    Housing Stability Vital Sign     Unable to Pay for Housing in the Last Year: No     Number of Times Moved in the Last Year: 1     Homeless in the Last Year: No       FAMILY HISTORY:     Family History   Problem Relation Name Age of Onset    Heart attack Mother         REVIEW OF SYSTEMS:   Review of Systems   Constitutional:  Negative for chills, diaphoresis, fever, malaise/fatigue and weight loss.   HENT:  Negative for congestion, hearing loss, sinus pain, sore throat and tinnitus.    Eyes:  Negative for blurred vision, double vision, photophobia and pain.   Respiratory:  Positive for shortness of breath. Negative for cough, hemoptysis, sputum production, wheezing and stridor.    Cardiovascular:  Positive for palpitations. Negative for chest pain, orthopnea, claudication, leg swelling and PND.   Gastrointestinal:  Negative for abdominal pain, blood in stool, heartburn, melena, nausea and vomiting.   Musculoskeletal:  Negative for back pain, falls, joint pain, myalgias and neck pain.   Neurological:  Negative for dizziness, tingling, tremors, sensory change, speech change, focal weakness, seizures, loss of consciousness, weakness and headaches.   Endo/Heme/Allergies:  Does not bruise/bleed easily.   Psychiatric/Behavioral:  Negative for depression, memory loss and substance abuse. The patient is not nervous/anxious.        PHYSICAL EXAM:     Vitals:    05/12/25 0907   BP: 134/66   Pulse: 62   Resp: 18      Body mass index is 26.05 kg/m².  Weight:  "64.6 kg (142 lb 6.7 oz)   Height: 5' 2" (157.5 cm)     Physical Exam  Constitutional:       General: She is not in acute distress.     Appearance: She is well-developed. She is not diaphoretic.   HENT:      Head: Normocephalic.   Neck:      Vascular: No carotid bruit or JVD.   Cardiovascular:      Rate and Rhythm: Normal rate and regular rhythm.      Pulses: Normal pulses.      Heart sounds: Normal heart sounds.   Pulmonary:      Effort: Pulmonary effort is normal.      Breath sounds: Normal breath sounds.   Abdominal:      General: Bowel sounds are normal.      Palpations: Abdomen is soft.      Tenderness: There is no abdominal tenderness.   Skin:     General: Skin is warm and dry.   Neurological:      Mental Status: She is alert and oriented to person, place, and time.   Psychiatric:         Speech: Speech normal.         Behavior: Behavior normal.         Thought Content: Thought content normal.         DATA:     EKG (personally reviewed tracing):  05/12/2025-normal sinus rhythm  Laboratory:  CBC:  Recent Labs   Lab 08/02/22  0840 05/12/23  1011 04/05/24  1403   WBC 5.25 4.90 8.27   Hemoglobin 13.9 12.4 14.0   Hematocrit 42.1 39.4 42.9   Platelets 391 397 424       CHEMISTRIES:  Recent Labs   Lab 08/02/22  0840 05/12/23  1011 04/05/24  1403   Glucose 97 80 106   Sodium 140 140 140   Potassium 5.0 4.6 4.5   BUN 18 15 11   Creatinine 0.7 0.8 0.7   Calcium 10.3 9.7 10.2       CARDIAC BIOMARKERS:        COAGS:        LIPIDS/LFTS:  Recent Labs   Lab 08/02/22  0840 05/12/23  1011 04/05/24  1403   Cholesterol 238 H 257 H  --    Triglycerides 154 H 171 H  --    HDL 58 55  --    LDL Cholesterol 149.2 167.8 H  --    Non-HDL Cholesterol 180 202  --    AST 20 23 24   ALT 24 22 19       Cardiovascular Testing:    Nuclear stress 03/05/2021:      Normal myocardial perfusion scan. There is no evidence of myocardial ischemia or infarction.    There is a mild intensity defect in the anterior wall of the left ventricle, secondary " to breast attenuation.    The gated perfusion images showed an ejection fraction of 70% at rest.    There is normal wall motion at rest and post stress.    The EKG portion of this study is negative for ischemia.    There were no arrhythmias during stress.    Echocardiogram 03/05/2021:    The left ventricle is normal in size with mild concentric hypertrophy and normal systolic function. The estimated ejection fraction is 60%  Mild aortic regurgitation.  Grade I left ventricular diastolic dysfunction.  Normal right ventricular size with normal right ventricular systolic function.  Mild left atrial enlargement.  Mild tricuspid regurgitation.  Normal central venous pressure (3 mmHg).  The estimated PA systolic pressure is 22 mmHg.    ASSESSMENT:     Preoperative cardiovascular evaluation  Palpitations: resolved  Hyperlipidemia: upcoming lipids  Family history of premature coronary artery disease    PLAN:     Preoperative cardiovascular evaluation: Patient can proceed. Functional capacity > 4 METS. RCRI 3.9% 30 day risk of death, MI or cardiac arrest.  Palpitations: Monitor.  Hyperlipidemia: Follow up lipids  Return to clinic one year.          Buddy Kent MD, MPH, FACC, [facility]

## 2025-05-13 ENCOUNTER — TELEPHONE (OUTPATIENT)
Dept: FAMILY MEDICINE | Facility: CLINIC | Age: 75
End: 2025-05-13
Payer: MEDICARE

## 2025-05-13 LAB
OHS QRS DURATION: 78 MS
OHS QTC CALCULATION: 422 MS

## 2025-05-13 NOTE — TELEPHONE ENCOUNTER
----- Message from Med Assistant Meraz sent at 5/9/2025  3:15 PM CDT -----  Regarding: Pre op Clearance  Good afternoon, Patient saw Dr. Rg on 5/7/25 for pre op clearance.  The office note is not done and we are trying to see if patient has been cleared to proceed with surgery on 5/16/25.Thank Yulisa Hammond Assistant Dr. Brian GodshawO[facility] Medicine [phone] fax

## 2025-05-15 ENCOUNTER — ANESTHESIA EVENT (OUTPATIENT)
Dept: SURGERY | Facility: HOSPITAL | Age: 75
End: 2025-05-15
Payer: MEDICARE

## 2025-05-15 ENCOUNTER — PATIENT MESSAGE (OUTPATIENT)
Dept: SPORTS MEDICINE | Facility: CLINIC | Age: 75
End: 2025-05-15
Payer: MEDICARE

## 2025-05-16 ENCOUNTER — ANESTHESIA (OUTPATIENT)
Dept: SURGERY | Facility: HOSPITAL | Age: 75
End: 2025-05-16
Payer: MEDICARE

## 2025-05-16 ENCOUNTER — HOSPITAL ENCOUNTER (OUTPATIENT)
Facility: HOSPITAL | Age: 75
Discharge: HOME | End: 2025-05-16
Attending: STUDENT IN AN ORGANIZED HEALTH CARE EDUCATION/TRAINING PROGRAM | Admitting: STUDENT IN AN ORGANIZED HEALTH CARE EDUCATION/TRAINING PROGRAM
Payer: MEDICARE

## 2025-05-16 VITALS
TEMPERATURE: 98 F | SYSTOLIC BLOOD PRESSURE: 118 MMHG | RESPIRATION RATE: 19 BRPM | HEART RATE: 59 BPM | WEIGHT: 143 LBS | HEIGHT: 62 IN | DIASTOLIC BLOOD PRESSURE: 70 MMHG | OXYGEN SATURATION: 96 % | BODY MASS INDEX: 26.31 KG/M2

## 2025-05-16 DIAGNOSIS — M75.21 BICEPS TENDINITIS OF RIGHT UPPER EXTREMITY: ICD-10-CM

## 2025-05-16 DIAGNOSIS — S46.011A TRAUMATIC COMPLETE TEAR OF RIGHT ROTATOR CUFF, INITIAL ENCOUNTER: ICD-10-CM

## 2025-05-16 DIAGNOSIS — S46.011A TRAUMATIC COMPLETE TEAR OF RIGHT ROTATOR CUFF: ICD-10-CM

## 2025-05-16 PROBLEM — M19.011 ARTHRITIS OF RIGHT ACROMIOCLAVICULAR JOINT: Status: ACTIVE | Noted: 2025-05-16

## 2025-05-16 LAB
OHS QRS DURATION: 78 MS
OHS QTC CALCULATION: 422 MS

## 2025-05-16 PROCEDURE — 27100025 HC TUBING, SET FLUID WARMER: Performed by: ANESTHESIOLOGY

## 2025-05-16 PROCEDURE — 36000710: Performed by: STUDENT IN AN ORGANIZED HEALTH CARE EDUCATION/TRAINING PROGRAM

## 2025-05-16 PROCEDURE — 29827 SHO ARTHRS SRG RT8TR CUF RPR: CPT | Mod: HCNC,RT,, | Performed by: STUDENT IN AN ORGANIZED HEALTH CARE EDUCATION/TRAINING PROGRAM

## 2025-05-16 PROCEDURE — 63600175 PHARM REV CODE 636 W HCPCS: Performed by: STUDENT IN AN ORGANIZED HEALTH CARE EDUCATION/TRAINING PROGRAM

## 2025-05-16 PROCEDURE — 63600175 PHARM REV CODE 636 W HCPCS: Performed by: NURSE ANESTHETIST, CERTIFIED REGISTERED

## 2025-05-16 PROCEDURE — 25000003 PHARM REV CODE 250: Performed by: ORTHOPAEDIC SURGERY

## 2025-05-16 PROCEDURE — 94761 N-INVAS EAR/PLS OXIMETRY MLT: CPT

## 2025-05-16 PROCEDURE — 29823 SHO ARTHRS SRG XTNSV DBRDMT: CPT | Mod: 51,HCNC,RT, | Performed by: STUDENT IN AN ORGANIZED HEALTH CARE EDUCATION/TRAINING PROGRAM

## 2025-05-16 PROCEDURE — 99900035 HC TECH TIME PER 15 MIN (STAT)

## 2025-05-16 PROCEDURE — 37000009 HC ANESTHESIA EA ADD 15 MINS: Performed by: STUDENT IN AN ORGANIZED HEALTH CARE EDUCATION/TRAINING PROGRAM

## 2025-05-16 PROCEDURE — 25000003 PHARM REV CODE 250: Performed by: NURSE ANESTHETIST, CERTIFIED REGISTERED

## 2025-05-16 PROCEDURE — 36000711: Performed by: STUDENT IN AN ORGANIZED HEALTH CARE EDUCATION/TRAINING PROGRAM

## 2025-05-16 PROCEDURE — 63600175 PHARM REV CODE 636 W HCPCS: Performed by: ORTHOPAEDIC SURGERY

## 2025-05-16 PROCEDURE — 25000003 PHARM REV CODE 250: Performed by: PHYSICIAN ASSISTANT

## 2025-05-16 PROCEDURE — D9220A PRA ANESTHESIA: Mod: ANES,,, | Performed by: ANESTHESIOLOGY

## 2025-05-16 PROCEDURE — 64416 NJX AA&/STRD BRCH PL NFS IMG: CPT | Performed by: STUDENT IN AN ORGANIZED HEALTH CARE EDUCATION/TRAINING PROGRAM

## 2025-05-16 PROCEDURE — 63600175 PHARM REV CODE 636 W HCPCS: Performed by: PHYSICIAN ASSISTANT

## 2025-05-16 PROCEDURE — 29824 SHO ARTHRS SRG DSTL CLAVICLC: CPT | Mod: 51,HCNC,RT, | Performed by: STUDENT IN AN ORGANIZED HEALTH CARE EDUCATION/TRAINING PROGRAM

## 2025-05-16 PROCEDURE — C1713 ANCHOR/SCREW BN/BN,TIS/BN: HCPCS | Performed by: STUDENT IN AN ORGANIZED HEALTH CARE EDUCATION/TRAINING PROGRAM

## 2025-05-16 PROCEDURE — 29828 SHO ARTHRS SRG BICP TENODSIS: CPT | Mod: 51,HCNC,RT, | Performed by: STUDENT IN AN ORGANIZED HEALTH CARE EDUCATION/TRAINING PROGRAM

## 2025-05-16 PROCEDURE — D9220A PRA ANESTHESIA: Mod: CRNA,,, | Performed by: NURSE ANESTHETIST, CERTIFIED REGISTERED

## 2025-05-16 PROCEDURE — 37000008 HC ANESTHESIA 1ST 15 MINUTES: Performed by: STUDENT IN AN ORGANIZED HEALTH CARE EDUCATION/TRAINING PROGRAM

## 2025-05-16 PROCEDURE — 71000033 HC RECOVERY, INTIAL HOUR: Performed by: STUDENT IN AN ORGANIZED HEALTH CARE EDUCATION/TRAINING PROGRAM

## 2025-05-16 PROCEDURE — 71000015 HC POSTOP RECOV 1ST HR: Performed by: STUDENT IN AN ORGANIZED HEALTH CARE EDUCATION/TRAINING PROGRAM

## 2025-05-16 PROCEDURE — 27201423 OPTIME MED/SURG SUP & DEVICES STERILE SUPPLY: Performed by: STUDENT IN AN ORGANIZED HEALTH CARE EDUCATION/TRAINING PROGRAM

## 2025-05-16 DEVICE — SWIVELOCK, SP BC KL 4.75MM
Type: IMPLANTABLE DEVICE | Site: SHOULDER | Status: FUNCTIONAL
Brand: ARTHREX®

## 2025-05-16 DEVICE — IMPLANTABLE DEVICE: Type: IMPLANTABLE DEVICE | Site: SHOULDER | Status: FUNCTIONAL

## 2025-05-16 DEVICE — LNT IMPLANT SYSTEM, 4.75 BC SWIVELOCK
Type: IMPLANTABLE DEVICE | Site: SHOULDER | Status: FUNCTIONAL
Brand: ARTHREX®

## 2025-05-16 DEVICE — SWIVELOCK, SP BC KL 5.5MM
Type: IMPLANTABLE DEVICE | Site: SHOULDER | Status: FUNCTIONAL
Brand: ARTHREX®

## 2025-05-16 DEVICE — 5.5MM BC CORKSCREW FT W/ SUTURETAPE
Type: IMPLANTABLE DEVICE | Site: SHOULDER | Status: FUNCTIONAL
Brand: ARTHREX®

## 2025-05-16 RX ORDER — CELECOXIB 200 MG/1
400 CAPSULE ORAL
Status: COMPLETED | OUTPATIENT
Start: 2025-05-16 | End: 2025-05-16

## 2025-05-16 RX ORDER — HALOPERIDOL LACTATE 5 MG/ML
0.5 INJECTION, SOLUTION INTRAMUSCULAR EVERY 10 MIN PRN
Status: DISCONTINUED | OUTPATIENT
Start: 2025-05-16 | End: 2025-05-16 | Stop reason: HOSPADM

## 2025-05-16 RX ORDER — FENTANYL CITRATE 50 UG/ML
25 INJECTION, SOLUTION INTRAMUSCULAR; INTRAVENOUS EVERY 5 MIN PRN
Status: DISCONTINUED | OUTPATIENT
Start: 2025-05-16 | End: 2025-05-16 | Stop reason: HOSPADM

## 2025-05-16 RX ORDER — ONDANSETRON HYDROCHLORIDE 2 MG/ML
INJECTION, SOLUTION INTRAVENOUS
Status: DISCONTINUED | OUTPATIENT
Start: 2025-05-16 | End: 2025-05-16

## 2025-05-16 RX ORDER — ROPIVACAINE HYDROCHLORIDE 5 MG/ML
INJECTION, SOLUTION EPIDURAL; INFILTRATION; PERINEURAL
Status: COMPLETED | OUTPATIENT
Start: 2025-05-16 | End: 2025-05-16

## 2025-05-16 RX ORDER — KETAMINE HCL IN 0.9 % NACL 50 MG/5 ML
SYRINGE (ML) INTRAVENOUS
Status: DISCONTINUED | OUTPATIENT
Start: 2025-05-16 | End: 2025-05-16

## 2025-05-16 RX ORDER — MIDAZOLAM HYDROCHLORIDE 1 MG/ML
1 INJECTION, SOLUTION INTRAMUSCULAR; INTRAVENOUS
Status: DISCONTINUED | OUTPATIENT
Start: 2025-05-16 | End: 2025-05-16 | Stop reason: HOSPADM

## 2025-05-16 RX ORDER — OXYCODONE HYDROCHLORIDE 5 MG/1
5 TABLET ORAL
Status: DISCONTINUED | OUTPATIENT
Start: 2025-05-16 | End: 2025-05-16 | Stop reason: HOSPADM

## 2025-05-16 RX ORDER — EPHEDRINE SULFATE 50 MG/ML
INJECTION, SOLUTION INTRAVENOUS
Status: DISCONTINUED | OUTPATIENT
Start: 2025-05-16 | End: 2025-05-16

## 2025-05-16 RX ORDER — NEOSTIGMINE METHYLSULFATE 0.5 MG/ML
INJECTION INTRAVENOUS
Status: DISCONTINUED | OUTPATIENT
Start: 2025-05-16 | End: 2025-05-16

## 2025-05-16 RX ORDER — LIDOCAINE HYDROCHLORIDE 20 MG/ML
INJECTION INTRAVENOUS
Status: DISCONTINUED | OUTPATIENT
Start: 2025-05-16 | End: 2025-05-16

## 2025-05-16 RX ORDER — DEXAMETHASONE SODIUM PHOSPHATE 4 MG/ML
INJECTION, SOLUTION INTRA-ARTICULAR; INTRALESIONAL; INTRAMUSCULAR; INTRAVENOUS; SOFT TISSUE
Status: DISCONTINUED | OUTPATIENT
Start: 2025-05-16 | End: 2025-05-16

## 2025-05-16 RX ORDER — ACETAMINOPHEN 500 MG
1000 TABLET ORAL
Status: COMPLETED | OUTPATIENT
Start: 2025-05-16 | End: 2025-05-16

## 2025-05-16 RX ORDER — ROCURONIUM BROMIDE 10 MG/ML
INJECTION, SOLUTION INTRAVENOUS
Status: DISCONTINUED | OUTPATIENT
Start: 2025-05-16 | End: 2025-05-16

## 2025-05-16 RX ORDER — CEFAZOLIN 2 G/1
2 INJECTION, POWDER, FOR SOLUTION INTRAMUSCULAR; INTRAVENOUS
Status: DISCONTINUED | OUTPATIENT
Start: 2025-05-16 | End: 2025-05-16 | Stop reason: HOSPADM

## 2025-05-16 RX ORDER — EPINEPHRINE 1 MG/ML
INJECTION, SOLUTION, CONCENTRATE INTRAVENOUS
Status: DISCONTINUED | OUTPATIENT
Start: 2025-05-16 | End: 2025-05-16 | Stop reason: HOSPADM

## 2025-05-16 RX ORDER — FENTANYL CITRATE 50 UG/ML
100 INJECTION, SOLUTION INTRAMUSCULAR; INTRAVENOUS
Status: DISCONTINUED | OUTPATIENT
Start: 2025-05-16 | End: 2025-05-16 | Stop reason: HOSPADM

## 2025-05-16 RX ORDER — SODIUM CHLORIDE 9 MG/ML
INJECTION, SOLUTION INTRAVENOUS CONTINUOUS
Status: DISCONTINUED | OUTPATIENT
Start: 2025-05-16 | End: 2025-05-16 | Stop reason: HOSPADM

## 2025-05-16 RX ORDER — ROPIVACAINE HYDROCHLORIDE 2 MG/ML
INJECTION, SOLUTION EPIDURAL; INFILTRATION; PERINEURAL CONTINUOUS
Status: DISCONTINUED | OUTPATIENT
Start: 2025-05-16 | End: 2025-05-16 | Stop reason: HOSPADM

## 2025-05-16 RX ORDER — PROPOFOL 10 MG/ML
VIAL (ML) INTRAVENOUS
Status: DISCONTINUED | OUTPATIENT
Start: 2025-05-16 | End: 2025-05-16

## 2025-05-16 RX ADMIN — ROPIVACAINE HYDROCHLORIDE 7.5 ML: 5 INJECTION EPIDURAL; INFILTRATION; PERINEURAL at 10:05

## 2025-05-16 RX ADMIN — ROCURONIUM BROMIDE 50 MG: 10 INJECTION, SOLUTION INTRAVENOUS at 12:05

## 2025-05-16 RX ADMIN — EPHEDRINE SULFATE 5 MG: 50 INJECTION INTRAVENOUS at 12:05

## 2025-05-16 RX ADMIN — PROPOFOL 200 MG: 10 INJECTION, EMULSION INTRAVENOUS at 12:05

## 2025-05-16 RX ADMIN — FENTANYL CITRATE 50 MCG: 50 INJECTION, SOLUTION INTRAMUSCULAR; INTRAVENOUS at 09:05

## 2025-05-16 RX ADMIN — ACETAMINOPHEN 1000 MG: 500 TABLET ORAL at 08:05

## 2025-05-16 RX ADMIN — FENTANYL CITRATE 100 MCG: 50 INJECTION, SOLUTION INTRAMUSCULAR; INTRAVENOUS at 12:05

## 2025-05-16 RX ADMIN — MIDAZOLAM 2 MG: 1 INJECTION INTRAMUSCULAR; INTRAVENOUS at 12:05

## 2025-05-16 RX ADMIN — NEOSTIGMINE METHYLSULFATE 5 MG: 0.5 INJECTION INTRAVENOUS at 02:05

## 2025-05-16 RX ADMIN — EPHEDRINE SULFATE 10 MG: 50 INJECTION INTRAVENOUS at 12:05

## 2025-05-16 RX ADMIN — SODIUM CHLORIDE: 9 INJECTION, SOLUTION INTRAVENOUS at 08:05

## 2025-05-16 RX ADMIN — MIDAZOLAM 1 MG: 1 INJECTION INTRAMUSCULAR; INTRAVENOUS at 09:05

## 2025-05-16 RX ADMIN — CEFAZOLIN 2 G: 2 INJECTION, POWDER, FOR SOLUTION INTRAMUSCULAR; INTRAVENOUS at 12:05

## 2025-05-16 RX ADMIN — TRANEXAMIC ACID 1000 MG: 100 INJECTION INTRAVENOUS at 12:05

## 2025-05-16 RX ADMIN — ONDANSETRON 4 MG: 2 INJECTION INTRAMUSCULAR; INTRAVENOUS at 02:05

## 2025-05-16 RX ADMIN — CELECOXIB 400 MG: 200 CAPSULE ORAL at 08:05

## 2025-05-16 RX ADMIN — Medication: at 02:05

## 2025-05-16 RX ADMIN — LIDOCAINE HYDROCHLORIDE 80 MG: 20 INJECTION INTRAVENOUS at 12:05

## 2025-05-16 RX ADMIN — Medication 20 MG: at 12:05

## 2025-05-16 RX ADMIN — DEXAMETHASONE SODIUM PHOSPHATE 8 MG: 4 INJECTION, SOLUTION INTRAMUSCULAR; INTRAVENOUS at 12:05

## 2025-05-16 RX ADMIN — SODIUM CHLORIDE, SODIUM GLUCONATE, SODIUM ACETATE, POTASSIUM CHLORIDE, MAGNESIUM CHLORIDE, SODIUM PHOSPHATE, DIBASIC, AND POTASSIUM PHOSPHATE: .53; .5; .37; .037; .03; .012; .00082 INJECTION, SOLUTION INTRAVENOUS at 12:05

## 2025-05-16 RX ADMIN — GLYCOPYRROLATE 0.6 MG: 0.2 INJECTION, SOLUTION INTRAMUSCULAR; INTRAVENOUS at 02:05

## 2025-05-16 NOTE — BRIEF OP NOTE
[facility] - Surgery ([facility])  Brief Operative Note    Surgery Date: 5/16/2025     Surgeons and Role:     * Steve Amador MD - Primary    Assisting Surgeon: Nazanin Stauffer MD     Pre-op Diagnosis:  Traumatic complete tear of right rotator cuff, initial encounter [S46.011A]  Biceps tendinitis of right upper extremity [M75.21]  Arthritis of right acromioclavicular joint [M19.011]    Post-op Diagnosis:  Post-Op Diagnosis Codes:     * Traumatic complete tear of right rotator cuff, initial encounter [S46.011A]     * Biceps tendinitis of right upper extremity [M75.21]     * Arthritis of right acromioclavicular joint [M19.011]    Procedure(s) (LRB):  ARTHROSCOPY, SHOULDER, W/ ROTATOR CUFF REPAIR (Right)  ARTHROSCOPY, SHOULDER, W/ BICEPS TENODESIS (Right)  ARTHROSCOPY, SHOULDER, W/ DISTAL CLAVICLE EXCISION (Right)  ARTHROSCOPY, SHOULDER, W/ CHONDROPLASTY (Right)    Anesthesia: Regional + GET    Operative Findings: see full op report    Estimated Blood Loss: minimal  Specimens:   Specimen (24h ago, onward)      None            * No specimens in log *        Discharge Note    OUTCOME: Patient tolerated treatment/procedure well without complication and is now ready for discharge.    DISPOSITION: Home or Self Care    FINAL DIAGNOSIS:  Traumatic complete tear of right rotator cuff    FOLLOWUP: In clinic    DISCHARGE INSTRUCTIONS:    Discharge Procedure Orders   Diet general     Call MD for:  temperature >100.4     Call MD for:  persistent nausea and vomiting     Call MD for:  severe uncontrolled pain     Call MD for:  difficulty breathing, headache or visual disturbances     Call MD for:  redness, tenderness, or signs of infection (pain, swelling, redness, odor or green/yellow discharge around incision site)     Call MD for:  hives     Call MD for:  persistent dizziness or light-headedness     Call MD for:  extreme fatigue     No driving, operating heavy equipment or signing legal documents while taking pain medication      Other restrictions (specify):   Order Comments: Follow post op instructions/protocol provided by Dr Marjorie Mendoza to affected area   Order Comments: using barrier between ice and skin (specify duration&frequency)     Non weight bearing

## 2025-05-16 NOTE — ANESTHESIA PROCEDURE NOTES
Intubation    Date/Time: 5/16/2025 12:23 PM    Performed by: Hetal Garcia CRNA  Authorized by: Iggy Kern MD    Intubation:     Induction:  Intravenous    Intubated:  Postinduction    Mask Ventilation:  Easy mask    Attempts:  1    Attempted By:  Student    Method of Intubation:  Video laryngoscopy    Blade:  Ku 3    Laryngeal View Grade: Grade I - full view of cords      Difficult Airway Encountered?: No      Complications:  None    Airway Device:  Oral endotracheal tube    Airway Device Size:  7.5    Style/Cuff Inflation:  Cuffed    Tube secured:  22    Secured at:  The lips    Placement Verified By:  Capnometry    Complicating Factors:  None    Findings Post-Intubation:  BS equal bilateral and atraumatic/condition of teeth unchanged

## 2025-05-16 NOTE — PLAN OF CARE
Patient resting comfortably, with call light in reach. Waiting on anesthesia and update.Daughter at bedside. Belongings placed in locker.

## 2025-05-16 NOTE — TRANSFER OF CARE
"Anesthesia Transfer of Care Note    Patient: Meera Britt    Procedure(s) Performed: Procedure(s) (LRB):  ARTHROSCOPY, SHOULDER, W/ ROTATOR CUFF REPAIR (Right)  ARTHROSCOPY, SHOULDER, W/ BICEPS TENODESIS (Right)  ARTHROSCOPY, SHOULDER, W/ DISTAL CLAVICLE EXCISION (Right)  ARTHROSCOPY, SHOULDER, W/ CHONDROPLASTY (Right)    Patient location: PACU    Anesthesia Type: general and regional    Transport from OR: Transported from OR on 6-10 L/min O2 by face mask with adequate spontaneous ventilation    Post pain: adequate analgesia    Post assessment: no apparent anesthetic complications and tolerated procedure well    Post vital signs: stable    Level of consciousness: sedated    Nausea/Vomiting: no nausea/vomiting    Complications: none    Transfer of care protocol was followed      Last vitals: Visit Vitals  BP (!) 114/57 (BP Location: Left arm, Patient Position: Lying)   Pulse 68   Temp 36.6 °C (97.9 °F) (Temporal)   Resp 18   Ht 5' 2" (1.575 m)   Wt 64.9 kg (143 lb)   SpO2 (!) 93%   Breastfeeding No   BMI 26.16 kg/m²     "

## 2025-05-16 NOTE — OP NOTE
DATE OF PROCEDURE: 05/16/2025    SURGEON: Steve Amador MD    ASSISTANT:   Nazanin MERCER     PREOPERATIVE DIAGNOSIS:    Right rotator cuff tear  Right degenerative SLAP tear with long head of biceps tendinitis   Right AC joint arthritis  Right shoulder chondromalacia    POSTOPERATIVE DIAGNOSIS:   Right rotator cuff tear  Right degenerative SLAP tear with long head of biceps tendinitis   Right AC joint arthritis  Right shoulder chondromalacia  Right shoulder synovitis   Right shoulder degenerative tearing of the anterior, superior, posterior labrum  Right shoulder subacromial impingement    PROCEDURE PERFORMED:   Right shoulder arthroscopic rotator cuff repair   Rich arthroscopic biceps tenodesis   Right shoulder arthroscopic distal clavicle excision   Right knee arthroscopic synovectomy right shoulder arthroscopic chondroplasty of the humeral head   Right shoulder arthroscopic extensive anterior, superior, posterior labrum   Right shoulder scopic subacromial decompression  Rich arthroscopic subacromial bursectomy      ANESTHESIA: General with interscalene block and catheter    BLOOD LOSS: Minimal.     IMPLANTS:  Implant Name Type Inv. Item Serial No.  Lot No. LRB No. Used Action   SYS SWIVELOCK LNT 4.75BC - [id number]  SYS SWIVELOCK LNT 4.75BC  [employer] [id number] Right 1 Implanted   ANCHOR SWIVELCK KL 4.75X24.5MM - [id number]  ANCHOR SWIVELCK KL 4.75X24.5MM  [employer] [id number] Right 1 Implanted   ANCHOR HAHN BC CORKSCREW 5.5MM - [id number]  ANCHOR HAHN BC CORKSCREW 5.5MM  [employer] [id number] Right 1 Implanted   ANCHOR HAHN BC CORKSCREW 5.5MM - [id number]  ANCHOR HAHN BC CORKSCREW 5.5MM  [employer] [id number] Right 1 Implanted   BIOBRACE RC DELIVERY SYSTEM [id number] BIOBRACE IMPLANT WITH DELIVERY INSTRUMENT    [employer] [id number] Right 1 Implanted   ANCHOR SWIVELOCK KL 5.5X24.5MM - [id number]  ANCHOR SWIVELOCK KL 5.5X24.5MM  [employer] [id number] Right 1 Implanted   ANCHOR SWIVELOCK KL 5.5X24.5MM  - [id number]  [facility] KL 5.5X24.5MM  [employer]X [id number] Right 1 Implanted         DRAINS: None.     COMPLICATIONS: None.     INTRAOPERATIVE FINDINGS:  Exam under anesthesia revealed range of motion of  170 forward elevation, 50 external rotation at 0°, at 90° there was 80 of external rotation and 60 of internal rotation.  Diagnostic arthroscopy revealed focal areas of grade 4 chondromalacia of the humeral head.  Extensive synovitis.  Large, full-thickness rotator cuff tear of the supraspinatus and infraspinatus.  Tearing of the upper border of the subscapularis.  Long head of biceps tendinitis.  Degenerative SLAP tear degenerative tearing of the circumferential glenoid labrum.  AC joint arthritis.  Subacromial impingement with bursitis.    BRIEF INDICATION OF MEDICAL NECESSITY:   Meera Britt is a 74 y.o. female is well known to our clinic with a long history of right shoulder pain.  Imaging, including radiographs and advanced imaging techniques of MRI, or performed which show rotator cuff tear.  she has had an extensive course of conservative treatment which have failed to relieved there pain.  After discussion with the patient it was determined the best course of action would be to proceed to the operating room for shoulder arthroscopy.  The potential procedures, as well as the risks benefits and alternatives, were explained in great detail to the patient.  All questions were answered and informed consent was obtained.      PROCEDURE IN DETAIL:   The patient was identified in the preoperative holding area and the site was marked.   a block was administered by the anesthesia team and she was taken to the operating room where there placed in the supine position on the operating room table.  General anesthetic agents were then administered.  An exam under anesthesia was performed, see detailed findings above.  The patient was then placed in the beach chair position and all bony prominences were well  padded.  Time-out was undertaken to confirm the patient, site, surgery, surgeon, and administration preoperative antibiotics.  All was agreed upon and we proceeded.    Bony landmarks were drawn and an 18 gauge spinal needle was inserted into the glenohumeral joint from the standard posterior arthroscopic portal location.  The joint was insufflated with saline solution.  The spinal needle was then removed and a knife was used to create our incision and the blunt trocar was placed within the glenohumeral joint.  A diagnostic arthroscopy was performed, see the detailed findings above.  A standard anterior arthroscopic portal was established under spinal needle guidance.  A knife was used for skin incision and a cannula was placed.    The frayed edges of the labrum were debrided using a shaver.  Edges of the subscapularis tendon were dissected using a combination of shaver and electrocautery, and the subscapularis tendon was thoroughly examined and appeared torn of the upper border.      The decision was made to perform a biceps tenodesis.  This was done via a loop intact fashion.  A link suture was passed circumferentially around the lateral aspect of the long head of the biceps tendon, just medial bicipital groove.  This was cinched down.  The free end of the link was then passed through the substance of the long head of the biceps tendon just medial to the cinch.  The long head of the biceps was then taken off of the attachment to the superior glenoid rim using combination of arthroscopic scissors and radiofrequency ablator.  The stump of the long of the biceps was then removed using a radiofrequency ablator.  This link was placed out of the way for placement in at SwiveLock anchor with a subscapularis repair.    The subscapularis was noted to be torn and in need of repair.  Tendon was dissected out proximally and freed of all adhesions along the anterior and posterior aspect of the tendon.   2 link sutures were  placed through the subscapularis tendon in a cinch type fashion.  These were loaded into a 4.75 mm SwiveLock anchor along with the link suture from the biceps tenodesis.  The lesser tuberosity of the subscapularis attachment was prepared using combination of radiofrequency ablator and arthroscopic qing.  A punch was used to create a guide hole in the anchor was placed.  The link sutures from the subscapularis repair and the biceps tenodesis were appropriately tensioned the anchor was advanced with good purchase.  The suture limbs were cut and images of the repair and tenodesis were then taken.    There was noted to be extensive synovitis of the shoulder and a arthroscopic synovectomy was performed using radiofrequency ablator arthroscopic shaver.  There was noted to be grade 4 chondromalacia to the humeral head and a chondroplasty was performed using arthroscopic shaver.    The instruments removed from the glenohumeral joint, and a blunt trocar was introduced in the subacromial space.  Using the shaver through the anterior portal, the initial part of the bursectomy was performed.  A standard lateral oral arthroscopic portal was established under spinal needle guidance over the suspected area of rotator cuff disease.  Cannula was then placed through the lateral portal and a combination of shaver and electrocautery was used to perform a subacromial bursectomy.  Hemostasis was achieved with electrocautery.      We then identified our rotator cuff tear.  The edges of the tear were dissected out using combination of radiofrequency ablator and arthroscopic shaver.  Once our tear was outlined we repaired our footprint.  This was done using combination of the radiofrequency ablator, arthroscopic shaver, arthroscopic bur, and power rasp.  A grasper was inserted through the lateral cannula and we ensured that the rotator cuff tendons were mobilized to the lateral aspect of the footprint, which they were.  We decided that a 2  x 2 double row repair would best fit this tear.  We began by placing 2 medial anchors.  A spinal needle was then placed percutaneously off the lateral border of the acromion to achieve appropriate trajectory for anchor placement.  Two double loaded corkscrew anchors were placed 1 at the anterior aspect of the tear and 1 at the posterior aspect of the tear at the junction of the footprint and cartilage.  These had excellent purchase.  The sutures were passed from anterior to posterior in a mattress type fashion.  This was done using a lying in 4 separate passes.  Each past contained 1 suture limb from each suture from the respective anchors.  One suture from the anterior anchor was then tied in standard fashion.  One suture from the posterior anchor was then tied in standard fashion.  The suture tails were cut.  This nicely reduced the rotator cuff to the footprint.  The remaining 4 sutures were then passed through the bio brace after retrieving them out of the lateral cannula.  Link sutures were placed in luggage tag type fashion on the lateral aspect of the bio brace.  The bio brace was then shuttled into the joint.  The sutures on the medial aspect were then tied in standard fashion to reduce the bio brace down to the medial aspect.  We then decided to place our lateral row anchors.  One limb from each suture was grasped and retrieved through the lateral cannula.  These were loaded into a 5.5 mm SwiveLock along with 1 of the link sutures from the lateral aspect of the bio brace.  These were placed on the anterior aspect of the lateral row.  The remaining sutures were then retrieved the lateral cannula loaded into a 2nd 5.5 mm SwiveLock was placed in the posterior aspect of the lateral row.  The suture limbs were tensioned prior to advancing each anchor.  This nicely compressed our rotator cuff down over the footprint.  At this point the rotator cuff repair was complete and final images of the repair were then  taken.    There were noted to be several spurs on the undersurface of the acromion, and a shaver was used to perform a subacromial decompression.  Final images the decompression were taken once we had a smooth undersurface.    Given the patient's history of AC joint tenderness, we opted to perform a distal clavicle excision.  Using a shaver through the anterior portal a distal clavicle excision was performed removing approximately 6 mm of the distal clavicle.  This was done to create a smooth edge and the shaver was easily passed between the acromion and the distal end of the clavicle.  Final images of this were taken.  Great care was taken does serve this superior AC joint ligaments.    At this point the procedure was complete and all instruments were removed.  The wounds were closed with nylon suture.  They were covered with sterile dressings.  Sling was then placed and the patient was returned to the supine position and awakened from the anesthetic agents.  The procedure was complete without complication and tolerated well by the patient.  Was then taken to the postanesthesia care unit in stable condition    POSTOPERATIVE PLAN:  She will follow the arthroscopic rotator cuff repair protocol.  Sling and abduction pillow at all times.  Return to clinic in 2 weeks.

## 2025-05-16 NOTE — ANESTHESIA PREPROCEDURE EVALUATION
05/15/2025    Meera Britt is a 74 y.o. female  who presents  for Procedure(s):  ARTHROSCOPY, SHOULDER, W/ ROTATOR CUFF REPAIR  ARTHROSCOPY, SHOULDER, W/ BICEPS TENODESIS  ARTHROSCOPY, SHOULDER, W/ DISTAL CLAVICLE EXCISION        Review of patient's allergies indicates:   Allergen Reactions    Iodine and iodide containing products      bloating    Sulfa (sulfonamide antibiotics) Nausea And Vomiting       Wt Readings from Last 1 Encounters:   05/12/25 0907 64.6 kg (142 lb 6.7 oz)       BP Readings from Last 3 Encounters:   05/12/25 134/66   05/07/25 118/70   05/06/25 100/64       Patient Active Problem List    Diagnosis Date Noted    Family history of premature coronary artery disease 05/12/2025    Encounter for Medicare annual wellness exam 04/08/2025    Advance directive discussed with patient 04/08/2025    GERD without esophagitis 04/08/2025    Heartburn 05/04/2018    Seasonal allergies 10/03/2017    Arthritis 10/03/2017    GERD with esophagitis 10/03/2017    Hyperthyroidism 10/03/2017    Other hyperlipidemia 10/03/2017    Vitamin D deficiency 10/03/2017        Past Surgical History:   Procedure Laterality Date    COLONOSCOPY N/A 10/26/2017    Procedure: COLONOSCOPY;  Surgeon: Heladio Gonzalez MD;  Location: [facility] ENDO;  Service: Endoscopy;  Laterality: N/A;    COLONOSCOPY N/A 6/5/2024    Procedure: COLONOSCOPY;  Surgeon: Delbert Bassett MD;  Location: [facility] ENDO;  Service: Endoscopy;  Laterality: N/A;  Ref Dr Bassett  Peg send via portal  5/29-unable to [facility] [place] for precall-MS  5/29 precall complete -ml    HYSTERECTOMY  1980    OOPHORECTOMY      TONSILLECTOMY         Social History[1]      Chemistry        Component Value Date/Time     04/05/2024 1403    K 4.5 04/05/2024 1403     04/05/2024 1403    CO2 28 04/05/2024 1403    BUN 11 04/05/2024 1403    CREATININE 0.7 04/05/2024 1403     04/05/2024 1403        Component Value Date/Time    CALCIUM 10.2 04/05/2024 1403    ALKPHOS 64 04/05/2024 1403     "AST 24 2024 1403    ALT 19 2024 1403    BILITOT 0.4 2024 1403    ESTGFRAFRICA >60.0 2021 0838    EGFRNONAA >60.0 2021 0838            Lab Results   Component Value Date    WBC 8.27 2024    HGB 14.0 2024    HCT 42.9 2024     2024    CHOL 257 (H) 2023    TRIG 171 (H) 2023    HDL 55 2023    ALT 19 2024    AST 24 2024     2024    K 4.5 2024     2024    CREATININE 0.7 2024    BUN 11 2024    CO2 28 2024    TSH 0.763 2023    HGBA1C 5.5 10/21/2024       No results for input(s): "PT", "INR", "PROTIME", "APTT" in the last 72 hours.    No results found for this or any previous visit.       Pre-op Assessment    I have reviewed the Patient Summary Reports.     I have reviewed the Nursing Notes. I have reviewed the NPO Status.   I have reviewed the Medications.     Review of Systems  Anesthesia Hx:  No problems with previous Anesthesia   History of prior surgery of interest to airway management or planning:  Previous anesthesia: MAC       Colonoscopy 24 with MAC.  Procedure performed at an [facility].   Personal Hx of Anesthesia complications (Headaches)                    Social:  No Alcohol Use, Non-Smoker       Hematology/Oncology:       -- Denies Anemia:               Hematology Comments: Vitamin D deficiency      --  Cancer in past history:              surgery   Oncology Comments: H/O Endometrial cancer, S/P Hysterectomy, Oophorectomy     EENT/Dental:   Seasonal allergies,  H/O Tonsillectomy,  Benign paroxysmal positional vertigo,          Cardiovascular:  Exercise tolerance: good      Denies MI.     Denies CABG/stent.    Denies Angina.     hyperlipidemia  Denies SHULTZ.  ECG has been reviewed. Significant family history of premature coronary disease, Mother  at age 44,  Cardiology appt 2025 for recent c/o SOB,  Saw Cardiology last in  Patient not on beta " blockers                          Pulmonary:    Denies COPD.  Denies Asthma.  Shortness of breath (referred to Cards)   Recurrent episodes of SOB and dizziness,  extensive family cardiac history, Referral to Cardiology placed by PCP, appt 5/12/25               Renal/:  Renal/ Normal                 Hepatic/GI:     GERD    Not Taking GLP-1 Agonists     Gerd          Musculoskeletal:  Arthritis   Traumatic complete tear of right rotator cuff,   Biceps tendinitis of right upper extremity  Arthritis of right acromioclavicular joint              OB/GYN/PEDS:  Hysterectomy, Oophorectomy            Neurological:  Neurology Normal   Denies CVA.    Denies Seizures.                                Endocrine:  Denies Diabetes.  Hyperthyroidism   Hyperthyroidism             Dermatological:  Skin Normal    Psych:  Psychiatric Normal                  Physical Exam  General: Well nourished, Cooperative, Alert and Oriented    Airway:  Mallampati: II   Mouth Opening: Normal  TM Distance: Normal  Tongue: Normal  Neck ROM: Normal ROM    Dental:  Intact, Periodontal disease      Past Medical History:   Diagnosis Date    Allergy     Arthritis     Endometrial cancer 1980    GERD (gastroesophageal reflux disease)     Hyperlipidemia     Thyroid disease      Past Surgical History:   Procedure Laterality Date    COLONOSCOPY N/A 10/26/2017    Procedure: COLONOSCOPY;  Surgeon: Heladio Gonzalez MD;  Location: [facility] ENDO;  Service: Endoscopy;  Laterality: N/A;    COLONOSCOPY N/A 6/5/2024    Procedure: COLONOSCOPY;  Surgeon: Delbert Bassett MD;  Location: [facility] ENDO;  Service: Endoscopy;  Laterality: N/A;  Ref Dr Bassett  Peg send via portal  5/29-unable to [facility] for precall-MS  5/29 precall complete -ml    HYSTERECTOMY  1980    OOPHORECTOMY      TONSILLECTOMY         Anesthesia Assessment: Preoperative EQUATION    Planned Procedure: Procedure(s) (LRB):  ARTHROSCOPY, SHOULDER, W/ ROTATOR CUFF REPAIR (Right)  ARTHROSCOPY, SHOULDER, W/ BICEPS  TENODESIS (Right)  ARTHROSCOPY, SHOULDER, W/ DISTAL CLAVICLE EXCISION (Right)  Requested Anesthesia Type:Regional  Surgeon: Steve Amador MD  Service: Orthopedics  Known or anticipated Date of Surgery:5/16/2025    Surgeon notes: reviewed    Electronic QUestionnaire Assessment completed via nurse interview with patient.        Triage considerations:     The patient has no apparent active cardiac condition (No unstable coronary Syndrome such as severe unstable angina or recent [<1 month] myocardial infarction, decompensated CHF, severe valvular   disease or significant arrhythmia)    Previous anesthesia records:MAC and No problems    [facility]   Subspecialty notes: Gastroenterology, Gyn/ONC, Ortho    Other important co-morbidities: GERD, HLD, and hyperthyroid       EKG 4/5/2024:  Vent. Rate : 066 BPM     Atrial Rate : 066 BPM      P-R Int : 182 ms          QRS Dur : 074 ms       QT Int : 444 ms       P-R-T Axes : 043 055 042 degrees      QTc Int : 465 ms   Normal sinus rhythm   Nonspecific ST and T wave abnormality   Abnormal ECG   When compared with ECG of 05-MAR-2021 10:02,   No significant change was found   Confirmed by Faustino WAN, Saurabh SILVA ([id number]) on 4/8/2024 9:27:02 PM       Exercise Nuclear Stress Test 3/5/2021:  Interpretation Summary  Show Result Comparison     Normal myocardial perfusion scan. There is no evidence of myocardial ischemia or infarction.    There is a mild intensity defect in the anterior wall of the left ventricle, secondary to breast attenuation.    The gated perfusion images showed an ejection fraction of 70% at rest.    There is normal wall motion at rest and post stress.    The EKG portion of this study is negative for ischemia.    There were no arrhythmias during stress.      Echo 3/5/2021:    Summary  Show Result ComparisonThe left ventricle is normal in size with mild concentric hypertrophy and normal systolic function. The estimated ejection  "fraction is 60%  Mild aortic regurgitation.  Grade I left ventricular diastolic dysfunction.  Normal right ventricular size with normal right ventricular systolic function.  Mild left atrial enlargement.  Mild tricuspid regurgitation.  Normal central venous pressure (3 mmHg).  The estimated PA systolic pressure is 22 mmHg.       Tests already available:  Results have been reviewed.            Instructions given. (See in Nurse's note) Preop medication instructions sent via portal message.    Optimization:  Anesthesia Preop Clinic Assessment Not Indicated    Medical Opinion Indicated: Yes, PCP       Sub-specialist consult indicated:Cardiology, appt 5/12/25      Plan:   Consultation:Patient's PCP for a statement of optimization     Patient has previously scheduled Medical Appointment: 5/7/2025    Navigation: Patient is medically optimized by PCP. May proceed with surgery.  Per Cardiology, "Preoperative cardiovascular evaluation: Patient can proceed. Functional capacity > 4 METS. RCRI 3.9% 30 day risk of death, MI or cardiac arrest."      05/12/2025 Cardiology visit : During hunting season noticed palpitations. She felt a correlation to excessive caffeine intake. She stopped and symptoms resolved. Denies any chest pain or shortness of breath. Plans for rotator cuff surgery this upcoming Friday. EKG shows normal sinus rhythm. Functional capacity > 4 METS.     Preoperative cardiovascular evaluation: Patient can proceed. Functional capacity > 4 METS. RCRI 3.9% 30 day risk of death, MI or cardiac arrest.  Palpitations: Monitor.  Hyperlipidemia: Follow up lipids  Return to clinic one year.      Ht: 5'2  Wt:65.3 kg (143 lb)  BMI: 26.33      Anesthesia Plan  Type of Anesthesia, risks & benefits discussed:    Anesthesia Type: Gen ETT, Regional  Intra-op Monitoring Plan: Standard ASA Monitors  Post Op Pain Control Plan: multimodal analgesia, IV/PO Opioids PRN and peripheral nerve block  Airway Plan: Video  Informed Consent: " Informed consent signed with the Patient and all parties understand the risks and agree with anesthesia plan.  All questions answered.   ASA Score: 2  Day of Surgery Review of History & Physical: H&P Update referred to the surgeon/provider.  Anesthesia Plan Notes: Per patient report, heart palpitations were found to be secondary to increased caffeine intake. After decreased intake, have resolved and have not had since  Documented SOB in chart, patient states no current SOB, was when she saw cardiology in 2021 shortly after her sister passed away and was attributed to anxiety. Has not had any SOB recently, states can walk a block and a flight of stairs without issue, no oxygen use at home.    Preoperative evaluation completed by chart review, updated DOS after patient evaluation and physical examination.    Discussed: general anesthesia with  ett, regional block with catheter placement for postoperative pain control. Discussed risks associated with regional block to include but not be limited to: bleeding, infection, or even nerve injury. Discussed risks associated with general anesthesia to include but not be limited to: dental or lip injury, post operative nausea and vomiting, cardiac arrest, stroke, or even death.     Patient voiced understanding and elects to proceed.        Ready For Surgery From Anesthesia Perspective.     .         [1]   Social History  Socioeconomic History    Marital status:    Tobacco Use    Smoking status: Never     Passive exposure: Never    Smokeless tobacco: Never   Substance and Sexual Activity    Alcohol use: No    Drug use: Never    Sexual activity: Not Currently     Social Drivers of Health     Financial Resource Strain: Medium Risk (4/8/2025)    Overall Financial Resource Strain (CARDIA)     Difficulty of Paying Living Expenses: Somewhat hard   Food Insecurity: No Food Insecurity (4/8/2025)    Hunger Vital Sign     Worried About Running Out of Food in the Last Year: Never  true     Ran Out of Food in the Last Year: Never true   Transportation Needs: No Transportation Needs (4/8/2025)    PRAPARE - Transportation     Lack of Transportation (Medical): No     Lack of Transportation (Non-Medical): No   Physical Activity: Insufficiently Active (4/8/2025)    Exercise Vital Sign     Days of Exercise per Week: 3 days     Minutes of Exercise per Session: 40 min   Stress: No Stress Concern Present (4/8/2025)    [employer] - Occupational Stress Questionnaire     Feeling of Stress : Not at all   Housing Stability: Low Risk  (4/8/2025)    Housing Stability Vital Sign     Unable to Pay for Housing in the Last Year: No     Number of Times Moved in the Last Year: 1     Homeless in the Last Year: No

## 2025-05-16 NOTE — PLAN OF CARE
Care plan reviewed w/ pt and family at bedside. AVSS on RA. R shoulder dressing CDI. CADD pump in place. No c/o pain. All Rx delivered to bedside. Pt states she is ready for discharge.

## 2025-05-16 NOTE — ANESTHESIA PROCEDURE NOTES
Peripheral Block    Patient location during procedure: pre-op   Block not for primary anesthetic.  Reason for block: at surgeon's request and post-op pain management   Post-op Pain Location: right shoulder   Start time: 5/16/2025 9:55 AM  Timeout: 5/16/2025 9:55 AM   End time: 5/16/2025 10:15 AM    Staffing  Authorizing Provider: Cindi Rodriguez MD  Performing Provider: Cindi Rodriguez MD    Staffing  Performed by: Cindi Rodriguez MD  Authorized by: Cindi Rodriguez MD    Preanesthetic Checklist  Completed: patient identified, IV checked, site marked, risks and benefits discussed, surgical consent, monitors and equipment checked, pre-op evaluation and timeout performed  Peripheral Block  Patient position: sitting  Prep: ChloraPrep and site prepped and draped  Patient monitoring: heart rate, cardiac monitor, continuous pulse ox, continuous capnometry and frequent blood pressure checks  Block type: interscalene  Laterality: right  Injection technique: continuous  Needle  Needle type: Tuohy   Needle gauge: 18 G  Needle length: 2 in  Needle localization: anatomical landmarks and ultrasound guidance  Catheter type: non-stimulating  Catheter size: 20 G  Test dose: lidocaine 1.5% with Epi 1-to-200,000 and negative   -ultrasound image captured on disc.  Assessment  Injection assessment: negative aspiration, negative parasthesia and local visualized surrounding nerve  Paresthesia pain: none  Heart rate change: no  Slow fractionated injection: yes  Pain Tolerance: no complaints and comfortable throughout block  Medications:    Medications: ropivacaine (NAROPIN) injection 0.5% - Perineural   7.5 mL - 5/16/2025 10:15:00 AM    Additional Notes  VSS.  [facility] RN monitoring vitals throughout procedure.  Patient tolerated procedure well.

## 2025-05-19 NOTE — ANESTHESIA POSTPROCEDURE EVALUATION
Anesthesia Post Evaluation    Patient: Meera Britt    Procedure(s) Performed: Procedure(s) (LRB):  ARTHROSCOPY, SHOULDER, W/ ROTATOR CUFF REPAIR (Right)  ARTHROSCOPY, SHOULDER, W/ BICEPS TENODESIS (Right)  ARTHROSCOPY, SHOULDER, W/ DISTAL CLAVICLE EXCISION (Right)  ARTHROSCOPY, SHOULDER, W/ CHONDROPLASTY (Right)    Final Anesthesia Type: general      Patient location during evaluation: PACU  Patient participation: Yes- Able to Participate  Level of consciousness: awake and alert and oriented  Post-procedure vital signs: reviewed and stable  Pain management: adequate  Airway patency: patent    PONV status at discharge: No PONV  Anesthetic complications: no      Cardiovascular status: blood pressure returned to baseline and hemodynamically stable  Respiratory status: unassisted, room air and spontaneous ventilation  Hydration status: euvolemic  Follow-up not needed.              Vitals Value Taken Time   /70 05/16/25 15:47   Temp 36.5 °C (97.7 °F) 05/16/25 15:45   Pulse 60 05/16/25 15:56   Resp 15 05/16/25 15:56   SpO2 97 % 05/16/25 15:56   Vitals shown include unfiled device data.      Event Time   Out of Recovery 15:45:00         Pain/Hiral Score: No data recorded

## 2025-05-20 ENCOUNTER — CLINICAL SUPPORT (OUTPATIENT)
Dept: REHABILITATION | Facility: HOSPITAL | Age: 75
End: 2025-05-20
Payer: MEDICARE

## 2025-05-20 DIAGNOSIS — M75.21 BICEPS TENDINITIS OF RIGHT UPPER EXTREMITY: ICD-10-CM

## 2025-05-20 DIAGNOSIS — S46.011A TRAUMATIC COMPLETE TEAR OF RIGHT ROTATOR CUFF, INITIAL ENCOUNTER: ICD-10-CM

## 2025-05-20 DIAGNOSIS — M25.611 IMPAIRED RANGE OF MOTION OF RIGHT SHOULDER: Primary | ICD-10-CM

## 2025-05-20 DIAGNOSIS — R29.898 SHOULDER WEAKNESS: ICD-10-CM

## 2025-05-20 PROCEDURE — 97110 THERAPEUTIC EXERCISES: CPT | Mod: PN

## 2025-05-20 PROCEDURE — 97161 PT EVAL LOW COMPLEX 20 MIN: CPT | Mod: PN

## 2025-05-29 ENCOUNTER — OFFICE VISIT (OUTPATIENT)
Dept: SPORTS MEDICINE | Facility: CLINIC | Age: 75
End: 2025-05-29
Payer: MEDICARE

## 2025-05-29 VITALS
WEIGHT: 143.44 LBS | SYSTOLIC BLOOD PRESSURE: 137 MMHG | BODY MASS INDEX: 26.39 KG/M2 | HEART RATE: 67 BPM | DIASTOLIC BLOOD PRESSURE: 80 MMHG | HEIGHT: 62 IN

## 2025-05-29 DIAGNOSIS — Z98.890 S/P RIGHT ROTATOR CUFF REPAIR: Primary | ICD-10-CM

## 2025-05-29 PROCEDURE — 99999 PR PBB SHADOW E&M-EST. PATIENT-LVL III: CPT | Mod: PBBFAC,,, | Performed by: ORTHOPAEDIC SURGERY

## 2025-05-29 NOTE — PROGRESS NOTES
Subjective:     Chief Complaint: Meera Britt is a 74 y.o. female who had concerns including Post-op Evaluation of the Right Shoulder and Post-op Evaluation.    HPI    Patient is here s/p below for 2 week post-op appointment. She reports 0/10 pain and is not taking anything for pain.  She denies any nausea, vomiting, fever, chills, shortness of breath, or calf pain. She is attending formal physical therapy at [facility].  Right arm sling in place.    DATE OF PROCEDURE: 05/16/2025     SURGEON: Steve Amador MD     PROCEDURE PERFORMED:   Right shoulder arthroscopic rotator cuff repair   Rich arthroscopic biceps tenodesis   Right shoulder arthroscopic distal clavicle excision   Right knee arthroscopic synovectomy right shoulder arthroscopic chondroplasty of the humeral head   Right shoulder arthroscopic extensive anterior, superior, posterior labrum   Right shoulder scopic subacromial decompression  Rich arthroscopic subacromial bursectomy    Review of Systems   Constitutional: Negative.   HENT: Negative.     Eyes: Negative.    Cardiovascular: Negative.    Respiratory: Negative.     Endocrine: Negative.    Hematologic/Lymphatic: Negative.    Skin: Negative.    Musculoskeletal:  Positive for joint pain, joint swelling, muscle weakness, myalgias and stiffness. Negative for falls.   Neurological: Negative.    Psychiatric/Behavioral: Negative.     Allergic/Immunologic: Negative.        Pain Related Questions  Over the past 3 days, what was your average pain during activity? (I.e. running, jogging, walking, climbing stairs, getting dressed, ect.): 0  Over the past 3 days, what was your highest pain level?: 0  Over the past 3 days, what was your lowest pain level? : 0    Other  How many nights a week are you awakened by your affected body part?: 0  Was the patient's HEIGHT measured or patient reported?: Patient Reported  Was the patient's WEIGHT measured or patient reported?: Measured    Objective:      General: Meera is well-developed, well-nourished, appears stated age, in no acute distress, alert and oriented to time, place and person.     General    Constitutional: She is oriented to person, place, and time. She appears well-developed and well-nourished. No distress.   Cardiovascular:  Intact distal pulses.            Neurological: She is alert and oriented to person, place, and time.   Psychiatric: She has a normal mood and affect. Her behavior is normal. Thought content normal.         Right Shoulder Exam     Inspection/Observation   Swelling: absent  Bruising: absent  Scars: present  Deformity: absent  Scapular Winging: absent  Scapular Dyskinesia: negative  Atrophy: absent    Other   Sensation: normal    Comments:  Incision sites healing well, without signs of erythema, infection, or wound dehiscence      Vascular Exam     Right Pulses      Radial:                    2+      Capillary Refill  Right Hand: normal capillary refill              Assessment:     Encounter Diagnosis   Name Primary?    S/P right rotator cuff repair Yes        Plan:     1. Patient instructed to continue to utilize right arm sling until we see her again for 6 week postop visit.  We discussed her restrictions in detail.    2. Suture tags removed Steri-Strips applied. Patient may now shower without covering incision site. Instructed not to submerge incision site in water for another 2 weeks    3. Continue daily Celebrex b.i.d.    4. Continue [facility] per our rotator cuff repair protocol.     5. RTC to see Steve Amador MD in 4 weeks for 6 week post-op evaluation      All of the patient's questions were answered. Patient was advised to call the clinic or contact me through the patient portal for any questions or concerns.         Patient questionnaires may have been collected.

## 2025-05-30 ENCOUNTER — CLINICAL SUPPORT (OUTPATIENT)
Dept: REHABILITATION | Facility: HOSPITAL | Age: 75
End: 2025-05-30
Payer: MEDICARE

## 2025-05-30 DIAGNOSIS — M25.611 IMPAIRED RANGE OF MOTION OF RIGHT SHOULDER: Primary | ICD-10-CM

## 2025-05-30 DIAGNOSIS — R29.898 SHOULDER WEAKNESS: ICD-10-CM

## 2025-05-30 PROCEDURE — 97110 THERAPEUTIC EXERCISES: CPT | Mod: PN

## 2025-05-30 PROCEDURE — 97140 MANUAL THERAPY 1/> REGIONS: CPT | Mod: PN

## 2025-05-30 PROCEDURE — 97112 NEUROMUSCULAR REEDUCATION: CPT | Mod: PN

## 2025-05-30 NOTE — PROGRESS NOTES
Outpatient Rehab    Physical Therapy Visit    Patient Name: Meera Britt  MRN: [id number]  YOB: 1950  Encounter Date: 5/30/2025    Therapy Diagnosis:   Encounter Diagnoses   Name Primary?    Impaired range of motion of right shoulder Yes    Shoulder weakness      Physician: Pilo Land*    Physician Orders: Eval and Treat  Medical Diagnosis: Traumatic complete tear of right rotator cuff, initial encounter  Biceps tendinitis of right upper extremity    Visit # / Visits Authorized:  1 / 20  Insurance Authorization Period: 5/19/2025 to 8/10/2025  Date of Evaluation: 5/20/2025  Plan of Care Certification:       PT/PTA:     Number of PTA visits since last PT visit:   Time In: 0655   Time Out: 0750  Total Time (in minutes): 55   Total Billable Time (in minutes): 53    FOTO:  Intake Score:  %  Survey Score 2:  %  Survey Score 3:  %    Precautions:       Subjective   Pt reports compliance with immobilizer..  Pain reported as 3/10.      Objective            Treatment:  Therapeutic Exercise  TE 1: digiflex Red x 3 min  TE 2: gripper x 3 min  TE 3: seated thoracic extension vs towel roll x 30  TE 4: pendulums x 20  Manual Therapy  MT 1: manual PROM GH  MT 2: gentle elbow PROM  MT 3: wrist PROM  Balance/Neuromuscular Re-Education  NMR 1: scapular retraction 2 x 10  NMR 2: shoulder shrugs 2 x 10  NMR 3: upper trap stretch 20 sec x 4  NMR 4: levator stretch 20 sec x 4    Time Entry(in minutes):  Manual Therapy Time Entry: 15  Neuromuscular Re-Education Time Entry: 15  Therapeutic Exercise Time Entry: 23    Assessment & Plan   Assessment: Pt presents compliant with immobilizer.  contineud postural re-education and hand/elbow/wrist activities with good performance. Good response to gentle stretching. Pt is progressing well toward established goals.  Evaluation/Treatment Tolerance: Patient tolerated treatment well    The patient will continue to benefit from skilled outpatient physical therapy in order  to address the deficits listed in the problem list on the initial evaluation, provide patient and family education, and maximize the patients level of independence in the home and community environments.     The patient's spiritual, cultural, and educational needs were considered, and the patient is agreeable to the plan of care and goals.     Education  Education was done with Patient. The patient's learning style includes Demonstration. The patient Demonstrates understanding.                 Plan: Will continue per PT plan of care.    Goals:     Kushal Reis, PT

## 2025-06-03 ENCOUNTER — CLINICAL SUPPORT (OUTPATIENT)
Dept: REHABILITATION | Facility: HOSPITAL | Age: 75
End: 2025-06-03
Payer: MEDICARE

## 2025-06-03 ENCOUNTER — LAB VISIT (OUTPATIENT)
Dept: LAB | Facility: HOSPITAL | Age: 75
End: 2025-06-03
Payer: MEDICARE

## 2025-06-03 DIAGNOSIS — R29.898 SHOULDER WEAKNESS: ICD-10-CM

## 2025-06-03 DIAGNOSIS — E05.90 HYPERTHYROIDISM: ICD-10-CM

## 2025-06-03 DIAGNOSIS — E78.49 OTHER HYPERLIPIDEMIA: ICD-10-CM

## 2025-06-03 DIAGNOSIS — M25.611 IMPAIRED RANGE OF MOTION OF RIGHT SHOULDER: Primary | ICD-10-CM

## 2025-06-03 LAB
ABSOLUTE EOSINOPHIL (OHS): 0.13 K/UL
ABSOLUTE MONOCYTE (OHS): 0.54 K/UL (ref 0.3–1)
ABSOLUTE NEUTROPHIL COUNT (OHS): 2.23 K/UL (ref 1.8–7.7)
ALBUMIN SERPL BCP-MCNC: 4 G/DL (ref 3.5–5.2)
ALP SERPL-CCNC: 59 UNIT/L (ref 40–150)
ALT SERPL W/O P-5'-P-CCNC: 11 UNIT/L (ref 10–44)
ANION GAP (OHS): 10 MMOL/L (ref 8–16)
AST SERPL-CCNC: 13 UNIT/L (ref 11–45)
BASOPHILS # BLD AUTO: 0.04 K/UL
BASOPHILS NFR BLD AUTO: 0.8 %
BILIRUB SERPL-MCNC: 0.3 MG/DL (ref 0.1–1)
BUN SERPL-MCNC: 13 MG/DL (ref 8–23)
CALCIUM SERPL-MCNC: 9.4 MG/DL (ref 8.7–10.5)
CHLORIDE SERPL-SCNC: 106 MMOL/L (ref 95–110)
CHOLEST SERPL-MCNC: 143 MG/DL (ref 120–199)
CHOLEST/HDLC SERPL: 2.8 {RATIO} (ref 2–5)
CO2 SERPL-SCNC: 27 MMOL/L (ref 23–29)
CREAT SERPL-MCNC: 0.7 MG/DL (ref 0.5–1.4)
ERYTHROCYTE [DISTWIDTH] IN BLOOD BY AUTOMATED COUNT: 12.1 % (ref 11.5–14.5)
GFR SERPLBLD CREATININE-BSD FMLA CKD-EPI: >60 ML/MIN/1.73/M2
GLUCOSE SERPL-MCNC: 85 MG/DL (ref 70–110)
HCT VFR BLD AUTO: 37.1 % (ref 37–48.5)
HDLC SERPL-MCNC: 52 MG/DL (ref 40–75)
HDLC SERPL: 36.4 % (ref 20–50)
HGB BLD-MCNC: 11.9 GM/DL (ref 12–16)
IMM GRANULOCYTES # BLD AUTO: 0.02 K/UL (ref 0–0.04)
IMM GRANULOCYTES NFR BLD AUTO: 0.4 % (ref 0–0.5)
LDLC SERPL CALC-MCNC: 68 MG/DL (ref 63–159)
LYMPHOCYTES # BLD AUTO: 2.15 K/UL (ref 1–4.8)
MCH RBC QN AUTO: 30.9 PG (ref 27–31)
MCHC RBC AUTO-ENTMCNC: 32.1 G/DL (ref 32–36)
MCV RBC AUTO: 96 FL (ref 82–98)
NONHDLC SERPL-MCNC: 91 MG/DL
NUCLEATED RBC (/100WBC) (OHS): 0 /100 WBC
PLATELET # BLD AUTO: 515 K/UL (ref 150–450)
PMV BLD AUTO: 10 FL (ref 9.2–12.9)
POTASSIUM SERPL-SCNC: 4.4 MMOL/L (ref 3.5–5.1)
PROT SERPL-MCNC: 6.6 GM/DL (ref 6–8.4)
RBC # BLD AUTO: 3.85 M/UL (ref 4–5.4)
RELATIVE EOSINOPHIL (OHS): 2.5 %
RELATIVE LYMPHOCYTE (OHS): 42.1 % (ref 18–48)
RELATIVE MONOCYTE (OHS): 10.6 % (ref 4–15)
RELATIVE NEUTROPHIL (OHS): 43.6 % (ref 38–73)
SODIUM SERPL-SCNC: 143 MMOL/L (ref 136–145)
T4 FREE SERPL-MCNC: 1.01 NG/DL (ref 0.71–1.51)
TRIGL SERPL-MCNC: 115 MG/DL (ref 30–150)
TSH SERPL-ACNC: 1.1 UIU/ML (ref 0.4–4)
WBC # BLD AUTO: 5.11 K/UL (ref 3.9–12.7)

## 2025-06-03 PROCEDURE — 84439 ASSAY OF FREE THYROXINE: CPT

## 2025-06-03 PROCEDURE — 80061 LIPID PANEL: CPT

## 2025-06-03 PROCEDURE — 97110 THERAPEUTIC EXERCISES: CPT | Mod: PN,CQ

## 2025-06-03 PROCEDURE — 97112 NEUROMUSCULAR REEDUCATION: CPT | Mod: PN,CQ

## 2025-06-03 PROCEDURE — 36415 COLL VENOUS BLD VENIPUNCTURE: CPT | Mod: PO

## 2025-06-03 PROCEDURE — 82040 ASSAY OF SERUM ALBUMIN: CPT

## 2025-06-03 PROCEDURE — 85025 COMPLETE CBC W/AUTO DIFF WBC: CPT

## 2025-06-03 PROCEDURE — 84443 ASSAY THYROID STIM HORMONE: CPT

## 2025-06-04 ENCOUNTER — RESULTS FOLLOW-UP (OUTPATIENT)
Dept: FAMILY MEDICINE | Facility: CLINIC | Age: 75
End: 2025-06-04

## 2025-06-04 ENCOUNTER — TELEPHONE (OUTPATIENT)
Dept: FAMILY MEDICINE | Facility: CLINIC | Age: 75
End: 2025-06-04
Payer: MEDICARE

## 2025-06-04 DIAGNOSIS — D64.9 ANEMIA, UNSPECIFIED TYPE: Primary | ICD-10-CM

## 2025-06-05 ENCOUNTER — CLINICAL SUPPORT (OUTPATIENT)
Dept: REHABILITATION | Facility: HOSPITAL | Age: 75
End: 2025-06-05
Payer: MEDICARE

## 2025-06-05 DIAGNOSIS — M25.611 IMPAIRED RANGE OF MOTION OF RIGHT SHOULDER: Primary | ICD-10-CM

## 2025-06-05 DIAGNOSIS — R29.898 SHOULDER WEAKNESS: ICD-10-CM

## 2025-06-05 PROCEDURE — 97140 MANUAL THERAPY 1/> REGIONS: CPT | Mod: PN

## 2025-06-05 PROCEDURE — 97112 NEUROMUSCULAR REEDUCATION: CPT | Mod: PN

## 2025-06-09 ENCOUNTER — CLINICAL SUPPORT (OUTPATIENT)
Dept: REHABILITATION | Facility: HOSPITAL | Age: 75
End: 2025-06-09
Payer: MEDICARE

## 2025-06-09 DIAGNOSIS — R29.898 SHOULDER WEAKNESS: ICD-10-CM

## 2025-06-09 DIAGNOSIS — M25.611 IMPAIRED RANGE OF MOTION OF RIGHT SHOULDER: Primary | ICD-10-CM

## 2025-06-09 PROCEDURE — 97112 NEUROMUSCULAR REEDUCATION: CPT | Mod: PN

## 2025-06-09 PROCEDURE — 97140 MANUAL THERAPY 1/> REGIONS: CPT | Mod: PN

## 2025-06-09 PROCEDURE — 97110 THERAPEUTIC EXERCISES: CPT | Mod: PN

## 2025-06-09 NOTE — PROGRESS NOTES
Outpatient Rehab    Physical Therapy Visit    Patient Name: Meera Britt  MRN: [id number]  YOB: 1950  Encounter Date: 6/9/2025    Therapy Diagnosis:   Encounter Diagnoses   Name Primary?    Impaired range of motion of right shoulder Yes    Shoulder weakness      Physician: Pilo Land*    Physician Orders: Eval and Treat  Medical Diagnosis: Traumatic complete tear of right rotator cuff, initial encounter  Biceps tendinitis of right upper extremity  Surgical Diagnosis: R RCR repair   Surgical Date: 5/16/2025    Visit # / Visits Authorized:  4 / 20  Insurance Authorization Period: 5/19/2025 to 8/10/2025  Date of Evaluation: 5/20/2025  Plan of Care Certification: 6/5/2025 to 8/21/2025      PT/PTA:     Number of PTA visits since last PT visit:   Time In: 0800   Time Out: 0840  Total Time (in minutes): 40   Total Billable Time (in minutes): 40    FOTO:  Intake Score:  %  Survey Score 2:  %  Survey Score 3:  %    Precautions:     Standard and fall risk      Subjective   Pt states she is doing good. She denies any pain. She has been compliant with sling abd abd pillow.  Pain reported as 0/10. DOS: 5-16-25    POW: 3 weeks and 3 days    Objective            Treatment:  Therapeutic Exercise  TE 1: Wrist flex/ext 3x10  TE 2: Wrist ab/ad 3x10  TE 3: Ball squeezes 3'  TE 4: pendulums lateral/fwd,bkwd/ [place] CW,CCW 60 sec each (required relax shoulder and passively move when moving body)  Manual Therapy  MT 1: manual PROM GH (Flexion)  MT 2: gentle elbow PROM  Balance/Neuromuscular Re-Education  NMR 1: scapular retraction 2 x 10  NMR 2: shoulder shrugs 2 x 10  NMR 3: upper trap stretch 20 sec x 4  NMR 4: levator stretch 20 sec x 4    Time Entry(in minutes):  Manual Therapy Time Entry: 10  Neuromuscular Re-Education Time Entry: 15  Therapeutic Exercise Time Entry: 15    Assessment & Plan   Assessment: Pt is about 3 weeks and 3 days since surgery. Pt presented with sling and abd pillow in place. Pt  responding well to exericses. Postural exericses performed today. Pt did not have any provocaiton of pain during therapy. PROM in flexion performed. Pt was able to achieve 90 deg of flexion wtih no stiffness or pain.  Evaluation/Treatment Tolerance: Patient tolerated treatment well    The patient will continue to benefit from skilled outpatient physical therapy in order to address the deficits listed in the problem list on the initial evaluation, provide patient and family education, and maximize the patients level of independence in the home and community environments.     The patient's spiritual, cultural, and educational needs were considered, and the patient is agreeable to the plan of care and goals.           Plan: Will continue per PT plan of care.    Goals:   Active       Physical Therapy       Physical Therapy Goal       Start:  06/05/25    Expected End:  08/21/25       Short Term Goals: 4 weeks  - Report decreased in pain at worse less than  <   / =  1  /10  to increase tolerance for functional mobility. Appropriate and ongoing  - Pt to improve range of motion by 25% to allow for improved functional mobility to allow for improvement in IADL's. Appropriate and ongoing  - Increased RUE MMT 1/2 grade to increase tolerance for ADL and work activities. Appropriate and ongoing  - Pt to report be conscious of impaired sitting and standing posture daily to decrease pain. Appropriate and ongoing  - Pt to tolerate HEP to improve ROM and independence with ADL's. Appropriate and ongoing    Long Term Goals: 8 weeks  - Report decreased in pain at worse less than  <   / =  0  /10  to increase tolerance for functional mobility. Appropriate and ongoing  - Patient will demonstrate improved overall function per FOTO Survey to 60% score or less. Appropriate and ongoing  - Increased RUE MMT 1 grade to increase tolerance for ADL and work activities. Appropriate and ongoing  - Pt to report and demonstrate proper posture in  standing and sitting to decrease pain. Appropriate and ongoing  - Pt to be Independent with HEP to improve ROM and independence with ADL's. Appropriate and ongoing  - Pt to improve range of motion by 75% to allow for improved functional mobility to allow for improvement in IADL's. Appropriate and ongoing               Mg Torres, PT

## 2025-06-09 NOTE — PROGRESS NOTES
Outpatient Rehab    Physical Therapy Visit    Patient Name: Meera Britt  MRN: [id number]  YOB: 1950  Encounter Date: 6/5/2025    Therapy Diagnosis:   Encounter Diagnoses   Name Primary?    Impaired range of motion of right shoulder Yes    Shoulder weakness      Physician: Pilo Land*    Physician Orders: Eval and Treat  Medical Diagnosis: Traumatic complete tear of right rotator cuff, initial encounter  Biceps tendinitis of right upper extremity    Visit # / Visits Authorized:  4 / 20  Insurance Authorization Period: 5/19/2025 to 8/10/2025  Date of Evaluation: 5/20/2025  Plan of Care Certification: 6/5/2025 to 8/21/2025      PT/PTA:     Number of PTA visits since last PT visit:   Time In: 0900   Time Out: 1000  Total Time (in minutes): 60   Total Billable Time (in minutes): 23    FOTO:  Intake Score:  %  Survey Score 2:  %  Survey Score 3:  %    Precautions:       Subjective   Continues to report stiffness and soreness. Maintaining her precautions, but ready to advance further..  Pain reported as 3/10.      Objective            Treatment:  Therapeutic Exercise  TE 1: Wrist flex/ext 3x10  TE 2: Wrist ab/ad 3x10  TE 3: Ball squeezes 3'  TE 4: pendulums lateral/fwd,bkwd/ [place] CW,CCW 60 sec each (required relax shoulder and passively move when moving body)  Manual Therapy  MT 1: manual PROM GH (Flexion)  MT 2: gentle elbow PROM  MT 3: wrist PROM  Balance/Neuromuscular Re-Education  NMR 1: scapular retraction 2 x 10  NMR 2: shoulder shrugs 2 x 10  NMR 3: upper trap stretch 20 sec x 4  NMR 4: levator stretch 20 sec x 4    Time Entry(in minutes):  Manual Therapy Time Entry: 10  Neuromuscular Re-Education Time Entry: 13    Assessment & Plan   Assessment: No new issues today. Continues to progress per protocol. Encouraged patient to not use affected arm for tasks. Cueing given for correct technique on pendulums. Will continue to progress as able.  Evaluation/Treatment Tolerance: Patient  tolerated treatment well    The patient will continue to benefit from skilled outpatient physical therapy in order to address the deficits listed in the problem list on the initial evaluation, provide patient and family education, and maximize the patients level of independence in the home and community environments.     The patient's spiritual, cultural, and educational needs were considered, and the patient is agreeable to the plan of care and goals.           Plan: Will continue per PT plan of care.    Goals:   Active       Physical Therapy       Physical Therapy Goal       Start:  06/05/25    Expected End:  08/21/25       Short Term Goals: 4 weeks  - Report decreased in pain at worse less than  <   / =  1  /10  to increase tolerance for functional mobility. Appropriate and ongoing  - Pt to improve range of motion by 25% to allow for improved functional mobility to allow for improvement in IADL's. Appropriate and ongoing  - Increased RUE MMT 1/2 grade to increase tolerance for ADL and work activities. Appropriate and ongoing  - Pt to report be conscious of impaired sitting and standing posture daily to decrease pain. Appropriate and ongoing  - Pt to tolerate HEP to improve ROM and independence with ADL's. Appropriate and ongoing    Long Term Goals: 8 weeks  - Report decreased in pain at worse less than  <   / =  0  /10  to increase tolerance for functional mobility. Appropriate and ongoing  - Patient will demonstrate improved overall function per FOTO Survey to 60% score or less. Appropriate and ongoing  - Increased RUE MMT 1 grade to increase tolerance for ADL and work activities. Appropriate and ongoing  - Pt to report and demonstrate proper posture in standing and sitting to decrease pain. Appropriate and ongoing  - Pt to be Independent with HEP to improve ROM and independence with ADL's. Appropriate and ongoing  - Pt to improve range of motion by 75% to allow for improved functional mobility to allow for  improvement in IADL's. Appropriate and ongoing               Active         Physical Therapy         Physical Therapy Goal         Start:  06/05/25    Expected End:  08/21/25        Short Term Goals: 4 weeks  - Report decreased in pain at worse less than  <   / =  1  /10  to increase tolerance for functional mobility. Appropriate and ongoing  - Pt to improve range of motion by 25% to allow for improved functional mobility to allow for improvement in IADL's. Appropriate and ongoing  - Increased RUE MMT 1/2 grade to increase tolerance for ADL and work activities. Appropriate and ongoing  - Pt to report be conscious of impaired sitting and standing posture daily to decrease pain. Appropriate and ongoing  - Pt to tolerate HEP to improve ROM and independence with ADL's. Appropriate and ongoing     Long Term Goals: 8 weeks  - Report decreased in pain at worse less than  <   / =  0  /10  to increase tolerance for functional mobility. Appropriate and ongoing  - Patient will demonstrate improved overall function per FOTO Survey to 60% score or less. Appropriate and ongoing  - Increased RUE MMT 1 grade to increase tolerance for ADL and work activities. Appropriate and ongoing  - Pt to report and demonstrate proper posture in standing and sitting to decrease pain. Appropriate and ongoing  - Pt to be Independent with HEP to improve ROM and independence with ADL's. Appropriate and ongoing  - Pt to improve range of motion by 75% to allow for improved functional mobility to allow for improvement in IADL's. Appropriate and ongoing                Ifeanyi Rodrigues, PT

## 2025-06-11 ENCOUNTER — CLINICAL SUPPORT (OUTPATIENT)
Dept: REHABILITATION | Facility: HOSPITAL | Age: 75
End: 2025-06-11
Payer: MEDICARE

## 2025-06-11 DIAGNOSIS — R29.898 SHOULDER WEAKNESS: ICD-10-CM

## 2025-06-11 DIAGNOSIS — M25.611 IMPAIRED RANGE OF MOTION OF RIGHT SHOULDER: Primary | ICD-10-CM

## 2025-06-11 PROCEDURE — 97110 THERAPEUTIC EXERCISES: CPT | Mod: PN,CQ

## 2025-06-11 PROCEDURE — 97112 NEUROMUSCULAR REEDUCATION: CPT | Mod: PN,CQ

## 2025-06-11 NOTE — PROGRESS NOTES
Outpatient Rehab    Physical Therapy Visit    Patient Name: Meera Britt  MRN: [id number]  YOB: 1950  Encounter Date: 6/11/2025    Therapy Diagnosis:   Encounter Diagnoses   Name Primary?    Impaired range of motion of right shoulder Yes    Shoulder weakness      Physician: Pilo Land*    Physician Orders: Eval and Treat  Medical Diagnosis: Traumatic complete tear of right rotator cuff, initial encounter  Biceps tendinitis of right upper extremity  Surgical Diagnosis: R RCR repair   Surgical Date: 5/16/2025    Visit # / Visits Authorized:  5 / 20  Insurance Authorization Period: 5/19/2025 to 8/10/2025  Date of Evaluation: 5/20/2025  Plan of Care Certification: 6/5/2025 to 8/21/2025      PT/PTA: PTA   Number of PTA visits since last PT visit:1  Time In: 1000   Time Out: 1043  Total Time (in minutes): 43   Total Billable Time (in minutes): 30    FOTO:  Intake Score:  %  Survey Score 2:  %  Survey Score 3:  %    Precautions:     Standard and fall risk      Subjective   Patient reports shoulder doing fine.    DOS: 5-16-25    POW: 3 weeks and 5 days    Objective            Treatment:  Therapeutic Exercise  TE 1: Wrist flex/ext 3x10  TE 2: Wrist ab/ad 3x10  TE 3: Ball squeezes 3'  TE 4: pendulums lateral/fwd,bkwd/ [place] CW,CCW 60 sec each (required relax shoulder and passively move when moving body)  Manual Therapy  MT 1: manual PROM GH (Flexion)  MT 2: gentle elbow PROM  Balance/Neuromuscular Re-Education  NMR 1: scapular retraction 2 x 10  NMR 2: shoulder shrugs 2 x 10  NMR 3: upper trap stretch 20 sec x 4  NMR 4: levator stretch 20 sec x 4    Time Entry(in minutes):  Manual Therapy Time Entry: 8  Neuromuscular Re-Education Time Entry: 15  Therapeutic Exercise Time Entry: 20    Assessment & Plan   Assessment: Pt is about 3 weeks and 5 days since surgery. Pt presented with sling and abd pillow in place. Pt responding well to exericses. Postural exericses performed today. Pt did not have any  provocaiton of pain during therapy. PROM in flexion performed. Will cont to progress per patient tolerance.  Evaluation/Treatment Tolerance: Patient tolerated treatment well    The patient will continue to benefit from skilled outpatient physical therapy in order to address the deficits listed in the problem list on the initial evaluation, provide patient and family education, and maximize the patients level of independence in the home and community environments.     The patient's spiritual, cultural, and educational needs were considered, and the patient is agreeable to the plan of care and goals.           Plan: Will continue per PT plan of care.    Goals:   Active       Physical Therapy       Physical Therapy Goal       Start:  06/05/25    Expected End:  08/21/25       Short Term Goals: 4 weeks  - Report decreased in pain at worse less than  <   / =  1  /10  to increase tolerance for functional mobility. Appropriate and ongoing  - Pt to improve range of motion by 25% to allow for improved functional mobility to allow for improvement in IADL's. Appropriate and ongoing  - Increased RUE MMT 1/2 grade to increase tolerance for ADL and work activities. Appropriate and ongoing  - Pt to report be conscious of impaired sitting and standing posture daily to decrease pain. Appropriate and ongoing  - Pt to tolerate HEP to improve ROM and independence with ADL's. Appropriate and ongoing    Long Term Goals: 8 weeks  - Report decreased in pain at worse less than  <   / =  0  /10  to increase tolerance for functional mobility. Appropriate and ongoing  - Patient will demonstrate improved overall function per FOTO Survey to 60% score or less. Appropriate and ongoing  - Increased RUE MMT 1 grade to increase tolerance for ADL and work activities. Appropriate and ongoing  - Pt to report and demonstrate proper posture in standing and sitting to decrease pain. Appropriate and ongoing  - Pt to be Independent with HEP to improve ROM  and independence with ADL's. Appropriate and ongoing  - Pt to improve range of motion by 75% to allow for improved functional mobility to allow for improvement in IADL's. Appropriate and ongoing               Ollie To, PTA

## 2025-06-16 ENCOUNTER — CLINICAL SUPPORT (OUTPATIENT)
Dept: REHABILITATION | Facility: HOSPITAL | Age: 75
End: 2025-06-16
Payer: MEDICARE

## 2025-06-16 DIAGNOSIS — M25.611 IMPAIRED RANGE OF MOTION OF RIGHT SHOULDER: Primary | ICD-10-CM

## 2025-06-16 DIAGNOSIS — R29.898 SHOULDER WEAKNESS: ICD-10-CM

## 2025-06-16 PROCEDURE — 97140 MANUAL THERAPY 1/> REGIONS: CPT | Mod: PN

## 2025-06-16 PROCEDURE — 97112 NEUROMUSCULAR REEDUCATION: CPT | Mod: PN

## 2025-06-16 PROCEDURE — 97110 THERAPEUTIC EXERCISES: CPT | Mod: PN

## 2025-06-16 NOTE — PROGRESS NOTES
Outpatient Rehab    Physical Therapy Visit    Patient Name: Meera Britt  MRN: [id number]  YOB: 1950  Encounter Date: 6/16/2025    Therapy Diagnosis:   Encounter Diagnoses   Name Primary?    Impaired range of motion of right shoulder Yes    Shoulder weakness      Physician: Pilo Land*    Physician Orders: Eval and Treat  Medical Diagnosis: Traumatic complete tear of right rotator cuff, initial encounter  Biceps tendinitis of right upper extremity  Surgical Diagnosis: R RCR repair   Surgical Date: 5/16/2025  Days Since Last Surgery: 31    Visit # / Visits Authorized:  6 / 20  Insurance Authorization Period: 5/19/2025 to 8/10/2025  Date of Evaluation: 5/20/2025  Plan of Care Certification: 6/5/2025 to 8/21/2025      PT/PTA:     Number of PTA visits since last PT visit:   Time In: 0950   Time Out: 1030  Total Time (in minutes): 40   Total Billable Time (in minutes): 38    FOTO:  Intake Score:  %  Survey Score 2:  %  Survey Score 3:  %    Precautions:       Subjective   Pt states the shoulder continues to improve..  Pain reported as 1/10. DOS: 5-16-25    POW: 4 weeks    Objective            Treatment:  Therapeutic Exercise  TE 1: Wrist flex/ext 3x10  TE 2: Wrist ab/ad 3x10  TE 3: Ball squeezes 3'  TE 4: pendulums lateral/fwd,bkwd/ [place] CW,CCW 60 sec each (required relax shoulder and passively move when moving body)  Manual Therapy  MT 1: manual PROM GH (Flexion)  MT 2: gentle elbow PROM  Balance/Neuromuscular Re-Education  NMR 1: scapular retraction 2 x 10  NMR 2: shoulder shrugs 2 x 10  NMR 3: upper trap stretch 20 sec x 4  NMR 4: levator stretch 20 sec x 4    Time Entry(in minutes):  Manual Therapy Time Entry: 10  Neuromuscular Re-Education Time Entry: 12  Therapeutic Exercise Time Entry: 20    Assessment & Plan   Assessment:  Pt presents compliant with immobilizer.  contineud postural re-education and hand/elbow/wrist activities with good performance. Good response to gentle  stretching. Pt is progressing well toward established goals. Evaluation/Treatment Tolerance: Patient tolerated treatment well        The patient will continue to benefit from skilled outpatient physical therapy in order to address the deficits listed in the problem list on the initial evaluation, provide patient and family education, and maximize the patients level of independence in the home and community environments.     The patient's spiritual, cultural, and educational needs were considered, and the patient is agreeable to the plan of care and goals.     Education  Education was done with Patient. The patient's learning style includes Demonstration. The patient Demonstrates understanding.                 Plan: Will continue per PT plan of care.    Goals:   Active       Physical Therapy       Physical Therapy Goal       Start:  06/05/25    Expected End:  08/21/25       Short Term Goals: 4 weeks  - Report decreased in pain at worse less than  <   / =  1  /10  to increase tolerance for functional mobility. Appropriate and ongoing  - Pt to improve range of motion by 25% to allow for improved functional mobility to allow for improvement in IADL's. Appropriate and ongoing  - Increased RUE MMT 1/2 grade to increase tolerance for ADL and work activities. Appropriate and ongoing  - Pt to report be conscious of impaired sitting and standing posture daily to decrease pain. Appropriate and ongoing  - Pt to tolerate HEP to improve ROM and independence with ADL's. Appropriate and ongoing    Long Term Goals: 8 weeks  - Report decreased in pain at worse less than  <   / =  0  /10  to increase tolerance for functional mobility. Appropriate and ongoing  - Patient will demonstrate improved overall function per FOTO Survey to 60% score or less. Appropriate and ongoing  - Increased RUE MMT 1 grade to increase tolerance for ADL and work activities. Appropriate and ongoing  - Pt to report and demonstrate proper posture in standing  and sitting to decrease pain. Appropriate and ongoing  - Pt to be Independent with HEP to improve ROM and independence with ADL's. Appropriate and ongoing  - Pt to improve range of motion by 75% to allow for improved functional mobility to allow for improvement in IADL's. Appropriate and ongoing               Kushal Reis, PT

## 2025-06-19 ENCOUNTER — LAB VISIT (OUTPATIENT)
Dept: LAB | Facility: HOSPITAL | Age: 75
End: 2025-06-19
Payer: MEDICARE

## 2025-06-19 ENCOUNTER — PATIENT OUTREACH (OUTPATIENT)
Dept: ADMINISTRATIVE | Facility: HOSPITAL | Age: 75
End: 2025-06-19
Payer: MEDICARE

## 2025-06-19 ENCOUNTER — RESULTS FOLLOW-UP (OUTPATIENT)
Dept: FAMILY MEDICINE | Facility: CLINIC | Age: 75
End: 2025-06-19

## 2025-06-19 ENCOUNTER — CLINICAL SUPPORT (OUTPATIENT)
Dept: REHABILITATION | Facility: HOSPITAL | Age: 75
End: 2025-06-19
Payer: MEDICARE

## 2025-06-19 DIAGNOSIS — D64.9 ANEMIA, UNSPECIFIED TYPE: ICD-10-CM

## 2025-06-19 DIAGNOSIS — R29.898 SHOULDER WEAKNESS: ICD-10-CM

## 2025-06-19 DIAGNOSIS — M25.611 IMPAIRED RANGE OF MOTION OF RIGHT SHOULDER: Primary | ICD-10-CM

## 2025-06-19 LAB
ABSOLUTE EOSINOPHIL (OHS): 0.1 K/UL
ABSOLUTE MONOCYTE (OHS): 0.51 K/UL (ref 0.3–1)
ABSOLUTE NEUTROPHIL COUNT (OHS): 2.9 K/UL (ref 1.8–7.7)
BASOPHILS # BLD AUTO: 0.03 K/UL
BASOPHILS NFR BLD AUTO: 0.5 %
ERYTHROCYTE [DISTWIDTH] IN BLOOD BY AUTOMATED COUNT: 12.7 % (ref 11.5–14.5)
HCT VFR BLD AUTO: 38 % (ref 37–48.5)
HGB BLD-MCNC: 12.1 GM/DL (ref 12–16)
IMM GRANULOCYTES # BLD AUTO: 0.01 K/UL (ref 0–0.04)
IMM GRANULOCYTES NFR BLD AUTO: 0.2 % (ref 0–0.5)
LYMPHOCYTES # BLD AUTO: 2.07 K/UL (ref 1–4.8)
MCH RBC QN AUTO: 31.2 PG (ref 27–31)
MCHC RBC AUTO-ENTMCNC: 31.8 G/DL (ref 32–36)
MCV RBC AUTO: 98 FL (ref 82–98)
NUCLEATED RBC (/100WBC) (OHS): 0 /100 WBC
PLATELET # BLD AUTO: 338 K/UL (ref 150–450)
PMV BLD AUTO: 10.5 FL (ref 9.2–12.9)
RBC # BLD AUTO: 3.88 M/UL (ref 4–5.4)
RELATIVE EOSINOPHIL (OHS): 1.8 %
RELATIVE LYMPHOCYTE (OHS): 36.8 % (ref 18–48)
RELATIVE MONOCYTE (OHS): 9.1 % (ref 4–15)
RELATIVE NEUTROPHIL (OHS): 51.6 % (ref 38–73)
WBC # BLD AUTO: 5.62 K/UL (ref 3.9–12.7)

## 2025-06-19 PROCEDURE — 97110 THERAPEUTIC EXERCISES: CPT | Mod: PN

## 2025-06-19 PROCEDURE — 85025 COMPLETE CBC W/AUTO DIFF WBC: CPT

## 2025-06-19 PROCEDURE — 97112 NEUROMUSCULAR REEDUCATION: CPT | Mod: PN

## 2025-06-19 PROCEDURE — 97140 MANUAL THERAPY 1/> REGIONS: CPT | Mod: PN

## 2025-06-19 PROCEDURE — 36415 COLL VENOUS BLD VENIPUNCTURE: CPT | Mod: PO

## 2025-06-19 NOTE — PROGRESS NOTES
Outpatient Rehab    Physical Therapy Progress Note    Patient Name: Meera Britt  MRN: [id number]  YOB: 1950  Encounter Date: 6/19/2025    Therapy Diagnosis:   Encounter Diagnoses   Name Primary?    Impaired range of motion of right shoulder Yes    Shoulder weakness      Physician: Pilo Land*    Physician Orders: Eval and Treat  Medical Diagnosis: Traumatic complete tear of right rotator cuff, initial encounter  Biceps tendinitis of right upper extremity  Surgical Diagnosis: R RCR repair   Surgical Date: 5/16/2025  Days Since Last Surgery: 34    Visit # / Visits Authorized:  7 / 20  Insurance Authorization Period: 5/19/2025 to 8/10/2025  Date of Evaluation: 5/20/2025  Plan of Care Certification: 6/5/2025 to 8/21/2025      PT/PTA:     Number of PTA visits since last PT visit:   Time In: 0850   Time Out: 0930  Total Time (in minutes): 40   Total Billable Time (in minutes): 40    FOTO:  Intake Score:  %  Survey Score 2:  %  Survey Score 3:  %    Precautions:     Standard and fall risk      Subjective   Pt states the shoulder continues to improve..  Pain reported as 0/10. DOS: 5-16-25    POW: 4 weeks and 6 days    Objective      Shoulder Range of Motion  Right Shoulder   Active (deg) Passive (deg) Pain   Flexion   90     Extension         Scaption         ABduction         ADduction         Horizontal ABduction         Horizontal ADduction         External Rotation (Shoulder ABducted 0 degrees)         External Rotation (Shoulder ABducted 45 degrees)         External Rotation (Shoulder ABducted 90 degrees)         Internal Rotation (Shoulder ABducted 0 degrees)         Internal Rotation (Shoulder ABducted 45 degrees)         Internal Rotation (Shoulder ABducted 90 degrees)                          Treatment:  Therapeutic Exercise  TE 1: Wrist flex/ext 3x10  TE 2: Wrist ab/ad 3x10  TE 3: Ball squeezes 3'  TE 4: pendulums lateral/fwd,bkwd/ [place] CW,CCW 60 sec each (required relax shoulder  and passively move when moving body)  Manual Therapy  MT 1: manual PROM GH (Flexion)  MT 2: gentle elbow PROM  MT 3: wrist PROM  Balance/Neuromuscular Re-Education  NMR 1: scapular retraction 2 x 10  NMR 2: shoulder shrugs 2 x 10  NMR 3: upper trap stretch 20 sec x 4  NMR 4: levator stretch 20 sec x 4    Time Entry(in minutes):  Manual Therapy Time Entry: 10  Neuromuscular Re-Education Time Entry: 10  Therapeutic Exercise Time Entry: 20    Assessment & Plan   Assessment: Pt is about 4 weeks and 5 days since surgery.  Pt presents compliant with immobilizer.  contineud postural re-education and hand/elbow/wrist activities with good performance. Good response to gentle stretching.  Pt was able to achieve 90 deg of R shoulder flexion PROM with no pain. Min v/c's required to avoid apprehension during PROM. PT was re-assessed. Pt met 3/5 STGs. Overall, pt has been progressing well in therapy. Pt is progressing well toward established goals. Evaluation/Treatment Tolerance: Patient tolerated treatment well   Evaluation/Treatment Tolerance: Patient tolerated treatment well    The patient will continue to benefit from skilled outpatient physical therapy in order to address the deficits listed in the problem list on the initial evaluation, provide patient and family education, and maximize the patients level of independence in the home and community environments.     The patient's spiritual, cultural, and educational needs were considered, and the patient is agreeable to the plan of care and goals.           Plan: Will continue per PT plan of care.    Goals:   Active       Physical Therapy       Physical Therapy Goal       Start:  06/05/25    Expected End:  08/21/25       Short Term Goals: 4 weeks  - Report decreased in pain at worse less than  <   / =  1  /10  to increase tolerance for functional mobility. Goal met 6-19-25  - Pt to improve range of motion by 25% to allow for improved functional mobility to allow for  improvement in IADL's. Goal not met 6-19-25  - Increased RUE MMT 1/2 grade to increase tolerance for ADL and work activities. Goal not met 6-19-25  - Pt to report be conscious of impaired sitting and standing posture daily to decrease pain. Goal met 6-19-25  - Pt to tolerate HEP to improve ROM and independence with ADL's. Goal met 6-19-25    Long Term Goals: 8 weeks  - Report decreased in pain at worse less than  <   / =  0  /10  to increase tolerance for functional mobility. Appropriate and ongoing  - Patient will demonstrate improved overall function per FOTO Survey to 60% score or less. Appropriate and ongoing  - Increased RUE MMT 1 grade to increase tolerance for ADL and work activities. Appropriate and ongoing  - Pt to report and demonstrate proper posture in standing and sitting to decrease pain. Appropriate and ongoing  - Pt to be Independent with HEP to improve ROM and independence with ADL's. Appropriate and ongoing  - Pt to improve range of motion by 75% to allow for improved functional mobility to allow for improvement in IADL's. Appropriate and ongoing               Mg Torres, PT

## 2025-06-23 ENCOUNTER — CLINICAL SUPPORT (OUTPATIENT)
Dept: REHABILITATION | Facility: HOSPITAL | Age: 75
End: 2025-06-23
Payer: MEDICARE

## 2025-06-23 DIAGNOSIS — R29.898 SHOULDER WEAKNESS: ICD-10-CM

## 2025-06-23 DIAGNOSIS — M25.611 IMPAIRED RANGE OF MOTION OF RIGHT SHOULDER: Primary | ICD-10-CM

## 2025-06-23 PROCEDURE — 97112 NEUROMUSCULAR REEDUCATION: CPT | Mod: HCNC,PN

## 2025-06-23 PROCEDURE — 97140 MANUAL THERAPY 1/> REGIONS: CPT | Mod: HCNC,PN

## 2025-06-23 PROCEDURE — 97110 THERAPEUTIC EXERCISES: CPT | Mod: HCNC,PN

## 2025-06-23 NOTE — PROGRESS NOTES
Outpatient Rehab    Physical Therapy Visit    Patient Name: Meera Britt  MRN: [id number]  YOB: 1950  Encounter Date: 6/23/2025    Therapy Diagnosis:   Encounter Diagnoses   Name Primary?    Impaired range of motion of right shoulder Yes    Shoulder weakness      Physician: Pilo Land*    Physician Orders: Eval and Treat  Medical Diagnosis: Traumatic complete tear of right rotator cuff, initial encounter  Biceps tendinitis of right upper extremity  Surgical Diagnosis: R RCR repair   Surgical Date: 5/16/2025  Days Since Last Surgery: 38    Visit # / Visits Authorized:  8 / 20  Insurance Authorization Period: 5/19/2025 to 8/10/2025  Date of Evaluation: 5/20/2025  Plan of Care Certification: 6/5/2025 to 8/21/2025      PT/PTA:     Number of PTA visits since last PT visit:   Time In: 0852   Time Out: 0940  Total Time (in minutes): 48   Total Billable Time (in minutes): 38    FOTO:  Intake Score:  %  Survey Score 2:  %  Survey Score 3:  %    Precautions:       Subjective   Pt reports less pain. states shoulder is doing better with therapy. Reports following up with MD this week..  Pain reported as 0/10. DOS: 5-16-25    POW: 5    Objective            Treatment:  Therapeutic Exercise  TE 1: Wrist flex/ext 3x10  TE 2: Wrist ab/ad 3x10  TE 3: Ball squeezes 3'  TE 4: pendulums lateral/fwd,bkwd/ [place] CW,CCW 60 sec each (required relax shoulder and passively move when moving body)  Manual Therapy  MT 1: manual PROM GH (Flexion)  MT 2: gentle elbow PROM  MT 3: wrist PROM  Balance/Neuromuscular Re-Education  NMR 1: scapular retraction 2 x 10  NMR 2: shoulder shrugs 2 x 10  NMR 3: upper trap stretch 20 sec x 4  NMR 4: levator stretch 20 sec x 4    Time Entry(in minutes):  Manual Therapy Time Entry: 10  Neuromuscular Re-Education Time Entry: 10  Therapeutic Exercise Time Entry: 20    Assessment & Plan   Assessment: Pt presents compliant with immobilizer.  contineud postural re-education and  hand/elbow/wrist activities with good performance. Good response to gentle stretching. Pt is progressing well toward established goals. Evaluation/Treatment Tolerance: Patient tolerated treatment well   Evaluation/Treatment Tolerance: Patient tolerated treatment well    The patient will continue to benefit from skilled outpatient physical therapy in order to address the deficits listed in the problem list on the initial evaluation, provide patient and family education, and maximize the patients level of independence in the home and community environments.     The patient's spiritual, cultural, and educational needs were considered, and the patient is agreeable to the plan of care and goals.     Education  Education was done with Patient. The patient's learning style includes Demonstration. The patient Demonstrates understanding.                 Plan: Will continue per PT plan of care.    Goals:   Active       Physical Therapy       Physical Therapy Goal       Start:  06/05/25    Expected End:  08/21/25       Short Term Goals: 4 weeks  - Report decreased in pain at worse less than  <   / =  1  /10  to increase tolerance for functional mobility. Goal met 6-19-25  - Pt to improve range of motion by 25% to allow for improved functional mobility to allow for improvement in IADL's. Goal not met 6-19-25  - Increased RUE MMT 1/2 grade to increase tolerance for ADL and work activities. Goal not met 6-19-25  - Pt to report be conscious of impaired sitting and standing posture daily to decrease pain. Goal met 6-19-25  - Pt to tolerate HEP to improve ROM and independence with ADL's. Goal met 6-19-25    Long Term Goals: 8 weeks  - Report decreased in pain at worse less than  <   / =  0  /10  to increase tolerance for functional mobility. Appropriate and ongoing  - Patient will demonstrate improved overall function per FOTO Survey to 60% score or less. Appropriate and ongoing  - Increased RUE MMT 1 grade to increase tolerance  for ADL and work activities. Appropriate and ongoing  - Pt to report and demonstrate proper posture in standing and sitting to decrease pain. Appropriate and ongoing  - Pt to be Independent with HEP to improve ROM and independence with ADL's. Appropriate and ongoing  - Pt to improve range of motion by 75% to allow for improved functional mobility to allow for improvement in IADL's. Appropriate and ongoing               Kushal Reis, PT

## 2025-06-25 ENCOUNTER — CLINICAL SUPPORT (OUTPATIENT)
Dept: REHABILITATION | Facility: HOSPITAL | Age: 75
End: 2025-06-25
Payer: MEDICARE

## 2025-06-25 DIAGNOSIS — M25.611 IMPAIRED RANGE OF MOTION OF RIGHT SHOULDER: Primary | ICD-10-CM

## 2025-06-25 DIAGNOSIS — R29.898 SHOULDER WEAKNESS: ICD-10-CM

## 2025-06-25 PROCEDURE — 97110 THERAPEUTIC EXERCISES: CPT | Mod: HCNC,PN

## 2025-06-25 PROCEDURE — 97112 NEUROMUSCULAR REEDUCATION: CPT | Mod: HCNC,PN

## 2025-06-25 NOTE — PROGRESS NOTES
Outpatient Rehab    Physical Therapy Visit    Patient Name: Meera Britt  MRN: [id number]  YOB: 1950  Encounter Date: 6/25/2025    Therapy Diagnosis:   Encounter Diagnoses   Name Primary?    Impaired range of motion of right shoulder Yes    Shoulder weakness      Physician: Pilo Land*    Physician Orders: Eval and Treat  Medical Diagnosis: Traumatic complete tear of right rotator cuff, initial encounter  Biceps tendinitis of right upper extremity  Surgical Diagnosis: R RCR repair   Surgical Date: 5/16/2025  Days Since Last Surgery: 40    Visit # / Visits Authorized:  9 / 20  Insurance Authorization Period: 5/19/2025 to 8/10/2025  Date of Evaluation: 5/20/2025  Plan of Care Certification: 6/5/2025 to 8/21/2025      PT/PTA:     Number of PTA visits since last PT visit:   Time In: 1000   Time Out: 1040  Total Time (in minutes): 40   Total Billable Time (in minutes): 23    FOTO:  Intake Score:  %  Survey Score 2:  %  Survey Score 3:  %    Precautions:       Subjective   Pt reports less pain. states shoulder is doing better with therapy. Reports following up with MD tomorrow.  Pain reported as 0/10. DOS: 5-16-25    POW: 5 week and 5 days    Objective      Shoulder Range of Motion  Right Shoulder   Active (deg) Passive (deg) Pain   Flexion   90                      Treatment:  Therapeutic Exercise  TE 1: Wrist flex/ext 3x10  TE 2: Wrist ab/ad 3x10  TE 3: Ball squeezes 3'  TE 4: pendulums lateral/fwd,bkwd/ [place] CW,CCW 60 sec each (required relax shoulder and passively move when moving body)  Manual Therapy  MT 1: manual PROM GH (Flexion)  MT 2: gentle elbow PROM  MT 3: wrist PROM  Balance/Neuromuscular Re-Education  NMR 1: scapular retraction 2 x 10  NMR 2: shoulder shrugs 2 x 10  NMR 3: upper trap stretch 20 sec x 4  NMR 4: levator stretch 20 sec x 4    Time Entry(in minutes):  Neuromuscular Re-Education Time Entry: 10  Therapeutic Exercise Time Entry: 13    Assessment & Plan   Assessment:  Pt is about 5 weeks and 5 days since surgery. Pt presents compliant with immobilizer.  contineud postural re-education and hand/elbow/wrist activities with good performance. Good response to gentle stretching.  Pt was able to achieve 90 deg of R shoulder flexion PROM. Pt is progressing well toward established goals. Evaluation/Treatment Tolerance: Patient tolerated treatment well        The patient will continue to benefit from skilled outpatient physical therapy in order to address the deficits listed in the problem list on the initial evaluation, provide patient and family education, and maximize the patients level of independence in the home and community environments.     The patient's spiritual, cultural, and educational needs were considered, and the patient is agreeable to the plan of care and goals.           Plan: Will continue per PT plan of care.    Goals:   Active       Physical Therapy       Physical Therapy Goal       Start:  06/05/25    Expected End:  08/21/25       Short Term Goals: 4 weeks  - Report decreased in pain at worse less than  <   / =  1  /10  to increase tolerance for functional mobility. Goal met 6-19-25  - Pt to improve range of motion by 25% to allow for improved functional mobility to allow for improvement in IADL's. Goal not met 6-19-25  - Increased RUE MMT 1/2 grade to increase tolerance for ADL and work activities. Goal not met 6-19-25  - Pt to report be conscious of impaired sitting and standing posture daily to decrease pain. Goal met 6-19-25  - Pt to tolerate HEP to improve ROM and independence with ADL's. Goal met 6-19-25    Long Term Goals: 8 weeks  - Report decreased in pain at worse less than  <   / =  0  /10  to increase tolerance for functional mobility. Appropriate and ongoing  - Patient will demonstrate improved overall function per FOTO Survey to 60% score or less. Appropriate and ongoing  - Increased RUE MMT 1 grade to increase tolerance for ADL and work activities.  Appropriate and ongoing  - Pt to report and demonstrate proper posture in standing and sitting to decrease pain. Appropriate and ongoing  - Pt to be Independent with HEP to improve ROM and independence with ADL's. Appropriate and ongoing  - Pt to improve range of motion by 75% to allow for improved functional mobility to allow for improvement in IADL's. Appropriate and ongoing               Mg Torres, PT

## 2025-06-26 ENCOUNTER — OFFICE VISIT (OUTPATIENT)
Dept: SPORTS MEDICINE | Facility: CLINIC | Age: 75
End: 2025-06-26
Payer: MEDICARE

## 2025-06-26 VITALS
HEIGHT: 62 IN | HEART RATE: 80 BPM | BODY MASS INDEX: 25.98 KG/M2 | SYSTOLIC BLOOD PRESSURE: 144 MMHG | WEIGHT: 141.19 LBS | DIASTOLIC BLOOD PRESSURE: 85 MMHG

## 2025-06-26 DIAGNOSIS — S46.011D TRAUMATIC COMPLETE TEAR OF RIGHT ROTATOR CUFF, SUBSEQUENT ENCOUNTER: ICD-10-CM

## 2025-06-26 DIAGNOSIS — Z98.890 S/P RIGHT ROTATOR CUFF REPAIR: Primary | ICD-10-CM

## 2025-06-26 PROCEDURE — 3077F SYST BP >= 140 MM HG: CPT | Mod: CPTII,HCNC,S$GLB, | Performed by: STUDENT IN AN ORGANIZED HEALTH CARE EDUCATION/TRAINING PROGRAM

## 2025-06-26 PROCEDURE — 3288F FALL RISK ASSESSMENT DOCD: CPT | Mod: CPTII,HCNC,S$GLB, | Performed by: STUDENT IN AN ORGANIZED HEALTH CARE EDUCATION/TRAINING PROGRAM

## 2025-06-26 PROCEDURE — 99999 PR PBB SHADOW E&M-EST. PATIENT-LVL III: CPT | Mod: PBBFAC,HCNC,, | Performed by: STUDENT IN AN ORGANIZED HEALTH CARE EDUCATION/TRAINING PROGRAM

## 2025-06-26 PROCEDURE — 3079F DIAST BP 80-89 MM HG: CPT | Mod: CPTII,HCNC,S$GLB, | Performed by: STUDENT IN AN ORGANIZED HEALTH CARE EDUCATION/TRAINING PROGRAM

## 2025-06-26 PROCEDURE — 1101F PT FALLS ASSESS-DOCD LE1/YR: CPT | Mod: CPTII,HCNC,S$GLB, | Performed by: STUDENT IN AN ORGANIZED HEALTH CARE EDUCATION/TRAINING PROGRAM

## 2025-06-26 PROCEDURE — 99024 POSTOP FOLLOW-UP VISIT: CPT | Mod: HCNC,S$GLB,, | Performed by: STUDENT IN AN ORGANIZED HEALTH CARE EDUCATION/TRAINING PROGRAM

## 2025-06-26 PROCEDURE — 1159F MED LIST DOCD IN RCRD: CPT | Mod: CPTII,HCNC,S$GLB, | Performed by: STUDENT IN AN ORGANIZED HEALTH CARE EDUCATION/TRAINING PROGRAM

## 2025-06-26 PROCEDURE — 1126F AMNT PAIN NOTED NONE PRSNT: CPT | Mod: CPTII,HCNC,S$GLB, | Performed by: STUDENT IN AN ORGANIZED HEALTH CARE EDUCATION/TRAINING PROGRAM

## 2025-06-26 PROCEDURE — 1160F RVW MEDS BY RX/DR IN RCRD: CPT | Mod: CPTII,HCNC,S$GLB, | Performed by: STUDENT IN AN ORGANIZED HEALTH CARE EDUCATION/TRAINING PROGRAM

## 2025-06-26 NOTE — PROGRESS NOTES
Subjective:     Chief Complaint: Meera Britt is a 74 y.o. female who had concerns including Pain of the Right Shoulder.    Pain  Pertinent negatives include no joint swelling or myalgias.   History of Present Illness    CHIEF COMPLAINT:  - Right shoulder follow-up s/p surgery.    HPI:  Client presents for follow-up regarding her right shoulder. She reports tenderness, particularly during movement, which worsens when bending the arm but is not present at rest. She can raise her arm and twist it out to the side. PT at the [facility] is intensifying, with therapists indicating they will increase the treatment intensity now that the initial six weeks have passed. She has been using a sling with a pillow but questions whether she should continue wearing it at night. She has begun engaging in light household activities, such as washing dishes, while being cautious not to lift heavy items. She uses her affected arm while wearing a brace to hold a cup and wash with the other hand. Her  has a crippled left arm, which impacts their household dynamics. She expresses confusion about how she managed to continue lifting heavy items (50 lbs of corn) despite long-standing shoulder damage.    She denies right shoulder pain at rest.    PREVIOUS TREATMENTS:  - PT for the right shoulder: Ongoing, initial six weeks completed, now intensifying. Therapy received at [facility] and on the [facility].    WORK STATUS:  - No longer able to lift heavy items (e.g., 50 lbs of corn) due to condition.         DATE OF PROCEDURE: 05/16/2025     SURGEON: Steve Amador MD     PROCEDURE PERFORMED:   Right shoulder arthroscopic rotator cuff repair   Rich arthroscopic biceps tenodesis   Right shoulder arthroscopic distal clavicle excision   Right knee arthroscopic synovectomy right shoulder arthroscopic chondroplasty of the humeral head   Right shoulder arthroscopic extensive anterior, superior, posterior labrum   Right shoulder mason  subacromial decompression  Rich arthroscopic subacromial bursectomy    Review of Systems   Constitutional: Negative.   HENT: Negative.     Eyes: Negative.    Cardiovascular: Negative.    Respiratory: Negative.     Endocrine: Negative.    Hematologic/Lymphatic: Negative.    Skin: Negative.    Musculoskeletal:  Positive for joint pain, muscle weakness and stiffness. Negative for falls, joint swelling and myalgias.   Neurological: Negative.    Psychiatric/Behavioral: Negative.     Allergic/Immunologic: Negative.                  Objective:     General: Meera is well-developed, well-nourished, appears stated age, in no acute distress, alert and oriented to time, place and person.     General    Nursing note and vitals reviewed.  Constitutional: She is oriented to person, place, and time. She appears well-developed and well-nourished. No distress.   HENT:   Head: Normocephalic and atraumatic.   Nose: Nose normal.   Eyes: EOM are normal.   Cardiovascular:  Intact distal pulses.            Pulmonary/Chest: Effort normal. No respiratory distress.   Neurological: She is alert and oriented to person, place, and time.   Psychiatric: She has a normal mood and affect. Her behavior is normal. Judgment and thought content normal.         Right Shoulder Exam     Inspection/Observation   Swelling: absent  Bruising: absent  Scars: present  Deformity: absent  Scapular Winging: absent  Scapular Dyskinesia: negative  Atrophy: absent    Other   Sensation: normal    Comments:  Incision sites healing well, without signs of erythema, infection, or wound dehiscence    Active motion 60/5  Passive motion 90/20    Vascular Exam     Right Pulses      Radial:                    2+      Capillary Refill  Right Hand: normal capillary refill              Assessment:   Meera Britt is a 74 y.o. female s/p above  Encounter Diagnoses   Name Primary?    S/P right rotator cuff repair Yes    Traumatic complete tear of right rotator cuff, subsequent  encounter           Plan:     Assessment & Plan    FOLLOW UP:  - Follow up in about 6 weeks.    PATIENT INSTRUCTIONS:  - Discontinue use of sling, except in crowded situations for the next 1-2 weeks.  - Started using right upper extremity for more day-to-day activities like washing hair, brushing teeth, and eating.  - Avoid picking up heavy items, such as pots.  - Do not lift 50 lbs of corn or similarly heavy objects.           All of the patient's questions were answered. Patient was advised to call the clinic or contact me through the patient portal for any questions or concerns.     This note was generated with the assistance of ambient listening technology. Verbal consent was obtained by the patient and accompanying visitor(s) for the recording of patient appointment to facilitate this note. I attest to having reviewed and edited the generated note for accuracy, though some syntax or spelling errors may persist. Please contact the author of this note for any clarification.    Patient questionnaires may have been collected.

## 2025-06-30 ENCOUNTER — CLINICAL SUPPORT (OUTPATIENT)
Dept: REHABILITATION | Facility: HOSPITAL | Age: 75
End: 2025-06-30
Payer: MEDICARE

## 2025-06-30 DIAGNOSIS — M25.611 IMPAIRED RANGE OF MOTION OF RIGHT SHOULDER: Primary | ICD-10-CM

## 2025-06-30 DIAGNOSIS — R29.898 SHOULDER WEAKNESS: ICD-10-CM

## 2025-06-30 PROCEDURE — 97110 THERAPEUTIC EXERCISES: CPT | Mod: HCNC,PN

## 2025-06-30 PROCEDURE — 97140 MANUAL THERAPY 1/> REGIONS: CPT | Mod: HCNC,PN

## 2025-06-30 NOTE — PROGRESS NOTES
Outpatient Rehab    Physical Therapy Visit    Patient Name: Meera Britt  MRN: [id number]  YOB: 1950  Encounter Date: 6/30/2025    Therapy Diagnosis:   Encounter Diagnoses   Name Primary?    Impaired range of motion of right shoulder Yes    Shoulder weakness      Physician: Pilo Land*    Physician Orders: Eval and Treat  Medical Diagnosis: Traumatic complete tear of right rotator cuff, initial encounter  Biceps tendinitis of right upper extremity  Surgical Diagnosis: R RCR repair   Surgical Date: 5/16/2025  Days Since Last Surgery: 45    Visit # / Visits Authorized:  10 / 20  Insurance Authorization Period: 5/19/2025 to 8/10/2025  Date of Evaluation: 5/20/2025  Plan of Care Certification: 6/5/2025 to 8/21/2025      PT/PTA:     Number of PTA visits since last PT visit:   Time In: 0947   Time Out: 1040  Total Time (in minutes): 53   Total Billable Time (in minutes): 53    FOTO:  Intake Score:  %  Survey Score 2:  %  Survey Score 3:  %    Precautions:       Subjective   Pt states she reutrn to M.D. She is feelign better. She states she is not wearing sling and abd pillow. She has some disconfort sleeping..  Pain reported as 1/10. DOS: 5-16-25    POW: 6 week and 3 days    Objective            Treatment:  Therapeutic Exercise  TE 1: supine shoulder flexion 1# wand AAROM 2x10  TE 2: SEated table slide AAROM 2x10  TE 3: Pulleys flexion  facing door 3 min  TE 4: pendulums lateral/fwd,bkwd/ [place] CW,CCW 60 sec each (required relax shoulder and passively move when moving body)  TE 5: supine chest press 1# wand AAROM 2x10  TE 6: supine seratus punch 1# wand AAROM 2x10  Manual Therapy  MT 1: manual PROM GH (Flexion and ER)  MT 2: .  MT 3: .  Balance/Neuromuscular Re-Education  NMR 1: .  NMR 2: .  NMR 3: .  NMR 4: .    Time Entry(in minutes):  Manual Therapy Time Entry: 10  Therapeutic Exercise Time Entry: 43    Assessment & Plan   Assessment: Pt is about 6 weeks and 3 days since surgery. Pt  presents without sling and abd pillow. WE progress to next phase of protocol. AAROM phase. Pt required mod v/c's to perform exercises with proper form and proper muscles activation. Pt was able ot achieve 130 deg of R shoulder flexion AAROM and 40 deg of R shoulder ER AAROM. Pt did not have any provocation pain Pt responded well to manual therapy.  Patient tolerated treatment well   Evaluation/Treatment Tolerance: Patient tolerated treatment well    The patient will continue to benefit from skilled outpatient physical therapy in order to address the deficits listed in the problem list on the initial evaluation, provide patient and family education, and maximize the patients level of independence in the home and community environments.     The patient's spiritual, cultural, and educational needs were considered, and the patient is agreeable to the plan of care and goals.           Plan: Will continue per PT plan of care.    Goals:   Active       Physical Therapy       Physical Therapy Goal       Start:  06/05/25    Expected End:  08/21/25       Short Term Goals: 4 weeks  - Report decreased in pain at worse less than  <   / =  1  /10  to increase tolerance for functional mobility. Goal met 6-19-25  - Pt to improve range of motion by 25% to allow for improved functional mobility to allow for improvement in IADL's. Goal not met 6-19-25  - Increased RUE MMT 1/2 grade to increase tolerance for ADL and work activities. Goal not met 6-19-25  - Pt to report be conscious of impaired sitting and standing posture daily to decrease pain. Goal met 6-19-25  - Pt to tolerate HEP to improve ROM and independence with ADL's. Goal met 6-19-25    Long Term Goals: 8 weeks  - Report decreased in pain at worse less than  <   / =  0  /10  to increase tolerance for functional mobility. Appropriate and ongoing  - Patient will demonstrate improved overall function per FOTO Survey to 60% score or less. Appropriate and ongoing  - Increased RUE  MMT 1 grade to increase tolerance for ADL and work activities. Appropriate and ongoing  - Pt to report and demonstrate proper posture in standing and sitting to decrease pain. Appropriate and ongoing  - Pt to be Independent with HEP to improve ROM and independence with ADL's. Appropriate and ongoing  - Pt to improve range of motion by 75% to allow for improved functional mobility to allow for improvement in IADL's. Appropriate and ongoing               Mg Torres, PT

## 2025-07-02 ENCOUNTER — CLINICAL SUPPORT (OUTPATIENT)
Dept: REHABILITATION | Facility: HOSPITAL | Age: 75
End: 2025-07-02
Payer: MEDICARE

## 2025-07-02 DIAGNOSIS — R29.898 SHOULDER WEAKNESS: ICD-10-CM

## 2025-07-02 DIAGNOSIS — M25.611 IMPAIRED RANGE OF MOTION OF RIGHT SHOULDER: Primary | ICD-10-CM

## 2025-07-02 PROCEDURE — 97140 MANUAL THERAPY 1/> REGIONS: CPT | Mod: HCNC,PN

## 2025-07-02 PROCEDURE — 97110 THERAPEUTIC EXERCISES: CPT | Mod: HCNC,PN

## 2025-07-02 NOTE — PROGRESS NOTES
Outpatient Rehab    Physical Therapy Visit    Patient Name: Meera Britt  MRN: [id number]  YOB: 1950  Encounter Date: 7/2/2025    Therapy Diagnosis:   Encounter Diagnoses   Name Primary?    Impaired range of motion of right shoulder Yes    Shoulder weakness      Physician: Pilo Land*    Physician Orders: Eval and Treat  Medical Diagnosis: Traumatic complete tear of right rotator cuff, initial encounter  Biceps tendinitis of right upper extremity  Surgical Diagnosis: R RCR repair   Surgical Date: 5/16/2025  Days Since Last Surgery: 47    Visit # / Visits Authorized:  11 / 20  Insurance Authorization Period: 5/19/2025 to 8/10/2025  Date of Evaluation: 5/20/2025  Plan of Care Certification: 6/5/2025 to 8/21/2025      PT/PTA:     Number of PTA visits since last PT visit:   Time In: 0940   Time Out: 1033  Total Time (in minutes): 53   Total Billable Time (in minutes): 53    FOTO:  Intake Score:  %  Survey Score 2:  %  Survey Score 3:  %    Precautions:       Subjective   Pt states she did well after last visit..  Pain reported as 0/10. DOS: 5-16-25    POW: 6 week and 5 days    Objective            Treatment:  Therapeutic Exercise  TE 1: supine shoulder flexion 1# wand AAROM 2x10  TE 2: SEated table slide AAROM 2x10  TE 3: Pulleys flexion  facing door 3 min  TE 4: pendulums lateral/fwd,bkwd/ [place] CW,CCW 60 sec each (required relax shoulder and passively move when moving body)  TE 5: supine chest press 1# wand AAROM 2x10  TE 6: supine seratus punch 1# wand AAROM 2x10  Manual Therapy  MT 1: manual PROM GH (Flexion and ER)      Time Entry(in minutes):  Manual Therapy Time Entry: 10  Therapeutic Exercise Time Entry: 43    Assessment & Plan   Assessment: Pt is about 6 weeks and 5 days since surgery. Pt presents without sling and abd pillow. We progress to next phase of protocol. AAROM phase. We cont with same exercises as last visit. Pt required mod v/c's to perform exercises with proper form  and proper muscles activation. Pt was able ot achieve 130 deg of R shoulder flexion AAROM and 40 deg of R shoulder ER AAROM. Pt did not have any provocation pain Pt responded well to manual therapy.  Patient tolerated treatment well   Evaluation/Treatment Tolerance: Patient tolerated treatment well    The patient will continue to benefit from skilled outpatient physical therapy in order to address the deficits listed in the problem list on the initial evaluation, provide patient and family education, and maximize the patients level of independence in the home and community environments.     The patient's spiritual, cultural, and educational needs were considered, and the patient is agreeable to the plan of care and goals.           Plan: Will continue per PT plan of care.    Goals:   Active       Physical Therapy       Physical Therapy Goal       Start:  06/05/25    Expected End:  08/21/25       Short Term Goals: 4 weeks  - Report decreased in pain at worse less than  <   / =  1  /10  to increase tolerance for functional mobility. Goal met 6-19-25  - Pt to improve range of motion by 25% to allow for improved functional mobility to allow for improvement in IADL's. Goal not met 6-19-25  - Increased RUE MMT 1/2 grade to increase tolerance for ADL and work activities. Goal not met 6-19-25  - Pt to report be conscious of impaired sitting and standing posture daily to decrease pain. Goal met 6-19-25  - Pt to tolerate HEP to improve ROM and independence with ADL's. Goal met 6-19-25    Long Term Goals: 8 weeks  - Report decreased in pain at worse less than  <   / =  0  /10  to increase tolerance for functional mobility. Appropriate and ongoing  - Patient will demonstrate improved overall function per FOTO Survey to 60% score or less. Appropriate and ongoing  - Increased RUE MMT 1 grade to increase tolerance for ADL and work activities. Appropriate and ongoing  - Pt to report and demonstrate proper posture in standing and  sitting to decrease pain. Appropriate and ongoing  - Pt to be Independent with HEP to improve ROM and independence with ADL's. Appropriate and ongoing  - Pt to improve range of motion by 75% to allow for improved functional mobility to allow for improvement in IADL's. Appropriate and ongoing               Mg Torres, PT

## 2025-07-07 ENCOUNTER — CLINICAL SUPPORT (OUTPATIENT)
Dept: REHABILITATION | Facility: HOSPITAL | Age: 75
End: 2025-07-07
Payer: MEDICARE

## 2025-07-07 DIAGNOSIS — R29.898 SHOULDER WEAKNESS: ICD-10-CM

## 2025-07-07 DIAGNOSIS — M25.611 IMPAIRED RANGE OF MOTION OF RIGHT SHOULDER: Primary | ICD-10-CM

## 2025-07-07 PROCEDURE — 97112 NEUROMUSCULAR REEDUCATION: CPT | Mod: HCNC,PN

## 2025-07-07 PROCEDURE — 97110 THERAPEUTIC EXERCISES: CPT | Mod: HCNC,PN

## 2025-07-07 NOTE — PROGRESS NOTES
Outpatient Rehab    Physical Therapy Visit    Patient Name: Meera Britt  MRN: [id number]  YOB: 1950  Encounter Date: 7/7/2025    Therapy Diagnosis:   Encounter Diagnoses   Name Primary?    Impaired range of motion of right shoulder Yes    Shoulder weakness      Physician: Pilo Land*    Physician Orders: Eval and Treat  Medical Diagnosis: Traumatic complete tear of right rotator cuff, initial encounter  Biceps tendinitis of right upper extremity  Surgical Diagnosis: R RCR repair   Surgical Date: 5/16/2025  Days Since Last Surgery: 52    Visit # / Visits Authorized:  12 / 20  Insurance Authorization Period: 5/19/2025 to 8/10/2025  Date of Evaluation: 5/20/2025  Plan of Care Certification: 6/5/2025 to 8/21/2025      PT/PTA:     Number of PTA visits since last PT visit:   Time In: 1100   Time Out: 1153  Total Time (in minutes): 53   Total Billable Time (in minutes): 53    FOTO:  Intake Score:  %  Survey Score 2:  %  Survey Score 3:  %    Precautions:       Subjective   Pt states that she is feeling better. She states she cont to work on HEP..  Pain reported as 0/10. DOS: 5-16-25    POW: 7 week and 3 days    Objective            Treatment:  Therapeutic Exercise  TE 1: supine shoulder flexion 1# wand AAROM 2x10  TE 2: SEated table slide AAROM 2x10  TE 3: Pulleys flexion  facing door 3 min  TE 4: pendulums lateral/fwd,bkwd/ [place] CW,CCW 60 sec each (required relax shoulder and passively move when moving body)  TE 5: supine chest press 1# wand AAROM 2x10  TE 6: supine seratus punch 1# wand AAROM 2x10  Manual Therapy  MT 1: manual PROM GH (Flexion and ER)      Time Entry(in minutes):  Neuromuscular Re-Education Time Entry: 10  Therapeutic Exercise Time Entry: 43    Assessment & Plan   Assessment: Pt is about 7 weeks and 3 days since surgery. We cont with  next phase of protocol. AAROM phase. We cont with same exercises as last visit. Pt required mod v/c's to perform exercises with proper form  and proper muscles activation. Pt was able ot achieve 140 deg of R shoulder flexion AAROM and 40 deg of R shoulder ER AAROM. Pt did not have any provocation pain Pt responded well to manual therapy.  Patient tolerated treatment well   Evaluation/Treatment Tolerance: Patient tolerated treatment well    The patient will continue to benefit from skilled outpatient physical therapy in order to address the deficits listed in the problem list on the initial evaluation, provide patient and family education, and maximize the patients level of independence in the home and community environments.     The patient's spiritual, cultural, and educational needs were considered, and the patient is agreeable to the plan of care and goals.           Plan: Will continue per PT plan of care.    Goals:   Active       Physical Therapy       Physical Therapy Goal       Start:  06/05/25    Expected End:  08/21/25       Short Term Goals: 4 weeks  - Report decreased in pain at worse less than  <   / =  1  /10  to increase tolerance for functional mobility. Goal met 6-19-25  - Pt to improve range of motion by 25% to allow for improved functional mobility to allow for improvement in IADL's. Goal not met 6-19-25  - Increased RUE MMT 1/2 grade to increase tolerance for ADL and work activities. Goal not met 6-19-25  - Pt to report be conscious of impaired sitting and standing posture daily to decrease pain. Goal met 6-19-25  - Pt to tolerate HEP to improve ROM and independence with ADL's. Goal met 6-19-25    Long Term Goals: 8 weeks  - Report decreased in pain at worse less than  <   / =  0  /10  to increase tolerance for functional mobility. Appropriate and ongoing  - Patient will demonstrate improved overall function per FOTO Survey to 60% score or less. Appropriate and ongoing  - Increased RUE MMT 1 grade to increase tolerance for ADL and work activities. Appropriate and ongoing  - Pt to report and demonstrate proper posture in standing and  sitting to decrease pain. Appropriate and ongoing  - Pt to be Independent with HEP to improve ROM and independence with ADL's. Appropriate and ongoing  - Pt to improve range of motion by 75% to allow for improved functional mobility to allow for improvement in IADL's. Appropriate and ongoing               Mg Torres, PT

## 2025-07-09 ENCOUNTER — CLINICAL SUPPORT (OUTPATIENT)
Dept: REHABILITATION | Facility: HOSPITAL | Age: 75
End: 2025-07-09
Payer: MEDICARE

## 2025-07-09 DIAGNOSIS — R29.898 SHOULDER WEAKNESS: ICD-10-CM

## 2025-07-09 DIAGNOSIS — M25.611 IMPAIRED RANGE OF MOTION OF RIGHT SHOULDER: Primary | ICD-10-CM

## 2025-07-09 PROCEDURE — 97112 NEUROMUSCULAR REEDUCATION: CPT | Mod: HCNC,PN,CQ

## 2025-07-09 PROCEDURE — 97110 THERAPEUTIC EXERCISES: CPT | Mod: HCNC,PN,CQ

## 2025-07-09 NOTE — PROGRESS NOTES
Outpatient Rehab    Physical Therapy Visit    Patient Name: Meera Britt  MRN: [id number]  YOB: 1950  Encounter Date: 7/9/2025    Therapy Diagnosis:   Encounter Diagnoses   Name Primary?    Impaired range of motion of right shoulder Yes    Shoulder weakness      Physician: Pilo Land*    Physician Orders: Eval and Treat  Medical Diagnosis: Traumatic complete tear of right rotator cuff, initial encounter  Biceps tendinitis of right upper extremity  Surgical Diagnosis: R RCR repair   Surgical Date: 5/16/2025  Days Since Last Surgery: 54    Visit # / Visits Authorized:  13 / 20  Insurance Authorization Period: 5/19/2025 to 8/10/2025  Date of Evaluation: 5/20/2025  Plan of Care Certification: 6/5/2025 to 8/21/2025      PT/PTA: PTA   Number of PTA visits since last PT visit:1  Time In: 0850   Time Out: 0943  Total Time (in minutes): 53   Total Billable Time (in minutes): 53    FOTO:  Intake Score:  %  Survey Score 2:  %  Survey Score 3:  %    Precautions:     Standard and fall risk; Right RCR on 5/16/2025 POW 7 weeks and 4 days       Subjective   She reports Right shoulder has been doing ok, some stiffness coming in today.  Pain reported as 0/10. DOS: 5-16-25    POW: 7 week and 4 days    Objective            Treatment:  Therapeutic Exercise  TE 2: SEated table slide AAROM 2x10  TE 3: Pulleys flexion  facing door 3 min  TE 4: pendulums lateral/fwd,bkwd/ [place] CW,CCW 60 sec each (required relax shoulder and passively move when moving body)  Manual Therapy  MT 1: manual PROM GH (Flexion and ER)  Balance/Neuromuscular Re-Education  NMR 1: supine shoulder flexion 1# wand AAROM +3x10  NMR 2: supine chest press 1# wand AAROM +3x10  NMR 3: supine seratus punch 1# wand AAROM +3x10  NMR 4: Supine Shoulder ER with wand 2x10    Time Entry(in minutes):  Manual Therapy Time Entry: 5  Neuromuscular Re-Education Time Entry: 38  Therapeutic Exercise Time Entry: 10    Assessment & Plan   Assessment: Pt is  about 7 weeks and 4 days since surgery. We cont with AAROM intervention. Pt required mod v/c's to perform exercises with proper form, proper muscles activation, and to prevent shoulder shrugs during ex. Pt was able ot achieve 145 deg of R shoulder flexion AAROM and 40 deg of R shoulder ER AAROM. Pt did not have any provocation pain. Pt responded well to manual therapy.  Patient tolerated treatment well   Evaluation/Treatment Tolerance: Patient tolerated treatment well    The patient will continue to benefit from skilled outpatient physical therapy in order to address the deficits listed in the problem list on the initial evaluation, provide patient and family education, and maximize the patients level of independence in the home and community environments.     The patient's spiritual, cultural, and educational needs were considered, and the patient is agreeable to the plan of care and goals.           Plan: Will continue per PT plan of care.    Goals:   Active       Physical Therapy       Physical Therapy Goal       Start:  06/05/25    Expected End:  08/21/25       Short Term Goals: 4 weeks  - Report decreased in pain at worse less than  <   / =  1  /10  to increase tolerance for functional mobility. Goal met 6-19-25  - Pt to improve range of motion by 25% to allow for improved functional mobility to allow for improvement in IADL's. Goal not met 6-19-25  - Increased RUE MMT 1/2 grade to increase tolerance for ADL and work activities. Goal not met 6-19-25  - Pt to report be conscious of impaired sitting and standing posture daily to decrease pain. Goal met 6-19-25  - Pt to tolerate HEP to improve ROM and independence with ADL's. Goal met 6-19-25    Long Term Goals: 8 weeks  - Report decreased in pain at worse less than  <   / =  0  /10  to increase tolerance for functional mobility. Appropriate and ongoing  - Patient will demonstrate improved overall function per FOTO Survey to 60% score or less. Appropriate and  ongoing  - Increased RUE MMT 1 grade to increase tolerance for ADL and work activities. Appropriate and ongoing  - Pt to report and demonstrate proper posture in standing and sitting to decrease pain. Appropriate and ongoing  - Pt to be Independent with HEP to improve ROM and independence with ADL's. Appropriate and ongoing  - Pt to improve range of motion by 75% to allow for improved functional mobility to allow for improvement in IADL's. Appropriate and ongoing               Ollie To, PTA

## 2025-07-14 ENCOUNTER — CLINICAL SUPPORT (OUTPATIENT)
Dept: REHABILITATION | Facility: HOSPITAL | Age: 75
End: 2025-07-14
Payer: MEDICARE

## 2025-07-14 DIAGNOSIS — M25.611 IMPAIRED RANGE OF MOTION OF RIGHT SHOULDER: Primary | ICD-10-CM

## 2025-07-14 DIAGNOSIS — R29.898 SHOULDER WEAKNESS: ICD-10-CM

## 2025-07-14 PROCEDURE — 97112 NEUROMUSCULAR REEDUCATION: CPT | Mod: HCNC,PN,CQ

## 2025-07-14 PROCEDURE — 97110 THERAPEUTIC EXERCISES: CPT | Mod: HCNC,PN,CQ

## 2025-07-14 NOTE — PROGRESS NOTES
"  Outpatient Rehab    Physical Therapy Visit    Patient Name: Meera Britt  MRN: [id number]  YOB: 1950  Encounter Date: 7/14/2025    Therapy Diagnosis:   Encounter Diagnoses   Name Primary?    Impaired range of motion of right shoulder Yes    Shoulder weakness      Physician: Pilo Land*    Physician Orders: Eval and Treat  Medical Diagnosis: Traumatic complete tear of right rotator cuff, initial encounter  Biceps tendinitis of right upper extremity  Surgical Diagnosis: R RCR repair   Surgical Date: 5/16/2025  Days Since Last Surgery: 59    Visit # / Visits Authorized:  14 / 20  Insurance Authorization Period: 5/19/2025 to 8/10/2025  Date of Evaluation: 5/20/2025  Plan of Care Certification: 6/5/2025 to 8/21/2025      PT/PTA: PTA   Number of PTA visits since last PT visit:2  Time In: 1119   Time Out: 1212  Total Time (in minutes): 53   Total Billable Time (in minutes): 53    FOTO:  Intake Score: 14%  Survey Score 2: Not applicable for this Episode%  Survey Score 3: Not applicable for this Episode%    Precautions:  Additional Precautions and Protocol Details: Standard and fall risk; Right RCR on 5/16/2025 POW 8 weeks and 3 days       Subjective   She reports some stiffness and still some popping here and there with supine shoulder flexion with wand but other than that she is doing good.  Pain reported as 0/10. DOS: 5-16-25    POW: 8 week and 3 days    Objective            Treatment:  Therapeutic Exercise  TE 1: AAROM Table walk back flexion [id number]" hold  TE 2: SEated table slide AAROM 2x10 flexion  TE 3: Pulleys flexion  facing door 3 min  TE 4: pendulums lateral/fwd,bkwd/ [place] CW,CCW 60 sec each (required relax shoulder and passively move when moving body)  Manual Therapy  MT 1: manual PROM GH (Flexion and ER)  Balance/Neuromuscular Re-Education  NMR 1: supine shoulder flexion 1# wand AAROM +3x10  NMR 2: supine chest press 1# wand AAROM +3x10  NMR 3: supine seratus punch 1# wand AAROM " +3x10  NMR 4: Supine Shoulder ER with wand 2x10    Time Entry(in minutes):  Manual Therapy Time Entry: 5  Neuromuscular Re-Education Time Entry: 38  Therapeutic Exercise Time Entry: 10    Assessment & Plan   Assessment: Pt is about 8 weeks and 3 days since surgery. We cont with AAROM intervention. Pt required mod v/c's to perform exercises with proper form, proper muscles activation, and to prevent shoulder shrugs during ex. Pt was able ot achieve 147 deg of R shoulder flexion AAROM and 46 deg of R shoulder ER AAROM. Pt did not have any provocation pain. Pt responded well to manual therapy.  Patient tolerated treatment well   Evaluation/Treatment Tolerance: Patient tolerated treatment well    The patient will continue to benefit from skilled outpatient physical therapy in order to address the deficits listed in the problem list on the initial evaluation, provide patient and family education, and maximize the patients level of independence in the home and community environments.     The patient's spiritual, cultural, and educational needs were considered, and the patient is agreeable to the plan of care and goals.           Plan: Will continue per PT plan of care.    Goals:   Active       Physical Therapy       Physical Therapy Goal       Start:  06/05/25    Expected End:  08/21/25       Short Term Goals: 4 weeks  - Report decreased in pain at worse less than  <   / =  1  /10  to increase tolerance for functional mobility. Goal met 6-19-25  - Pt to improve range of motion by 25% to allow for improved functional mobility to allow for improvement in IADL's. Goal not met 6-19-25  - Increased RUE MMT 1/2 grade to increase tolerance for ADL and work activities. Goal not met 6-19-25  - Pt to report be conscious of impaired sitting and standing posture daily to decrease pain. Goal met 6-19-25  - Pt to tolerate HEP to improve ROM and independence with ADL's. Goal met 6-19-25    Long Term Goals: 8 weeks  - Report decreased  in pain at worse less than  <   / =  0  /10  to increase tolerance for functional mobility. Appropriate and ongoing  - Patient will demonstrate improved overall function per FOTO Survey to 60% score or less. Appropriate and ongoing  - Increased RUE MMT 1 grade to increase tolerance for ADL and work activities. Appropriate and ongoing  - Pt to report and demonstrate proper posture in standing and sitting to decrease pain. Appropriate and ongoing  - Pt to be Independent with HEP to improve ROM and independence with ADL's. Appropriate and ongoing  - Pt to improve range of motion by 75% to allow for improved functional mobility to allow for improvement in IADL's. Appropriate and ongoing               Ollie To, PTA

## 2025-07-16 ENCOUNTER — CLINICAL SUPPORT (OUTPATIENT)
Dept: REHABILITATION | Facility: HOSPITAL | Age: 75
End: 2025-07-16
Payer: MEDICARE

## 2025-07-16 DIAGNOSIS — M25.611 IMPAIRED RANGE OF MOTION OF RIGHT SHOULDER: Primary | ICD-10-CM

## 2025-07-16 DIAGNOSIS — R29.898 SHOULDER WEAKNESS: ICD-10-CM

## 2025-07-16 PROCEDURE — 97112 NEUROMUSCULAR REEDUCATION: CPT | Mod: HCNC,PN,CQ

## 2025-07-16 NOTE — PROGRESS NOTES
"  Outpatient Rehab    Physical Therapy Visit    Patient Name: Meera Britt  MRN: [id number]  YOB: 1950  Encounter Date: 7/16/2025    Therapy Diagnosis:   Encounter Diagnoses   Name Primary?    Impaired range of motion of right shoulder Yes    Shoulder weakness      Physician: Pilo Land*    Physician Orders: Eval and Treat  Medical Diagnosis: Traumatic complete tear of right rotator cuff, initial encounter  Biceps tendinitis of right upper extremity  Surgical Diagnosis: R RCR repair   Surgical Date: 5/16/2025  Days Since Last Surgery: 61    Visit # / Visits Authorized:  15 / 20  Insurance Authorization Period: 5/19/2025 to 8/10/2025  Date of Evaluation: 5/20/2025  Plan of Care Certification: 6/5/2025 to 8/21/2025      PT/PTA: PTA   Number of PTA visits since last PT visit:3  Time In: 0900   Time Out: 0955  Total Time (in minutes): 55   Total Billable Time (in minutes): 30    FOTO:  Intake Score: 14%  Survey Score 2: Not applicable for this Episode%  Survey Score 3: Not applicable for this Episode%    Precautions:  Additional Precautions and Protocol Details: Standard and fall risk; Right RCR on 5/16/2025 POW 8 weeks and 5 days       Subjective   She reports stiffness this morning.  Pain reported as 0/10. DOS: 5-16-25    POW: 8 week and 5 days    Objective            Treatment:  Therapeutic Exercise  TE 1: AAROM Table walk back flexion [id number]" hold  TE 2: SEated table slide AAROM 2x10 flexion  TE 3: Pulleys flexion  facing door 3 min  TE 4: pendulums lateral/fwd,bkwd/ [place] CW,CCW 90 sec each (required relax shoulder and passively move when moving body)  Manual Therapy  MT 1: manual PROM GH (Flexion and ER)  Balance/Neuromuscular Re-Education  NMR 1: supine shoulder flexion 1# wand AAROM +3x10  NMR 2: supine chest press 1# wand AAROM +3x10  NMR 3: supine seratus punch 1# wand AAROM +3x10  NMR 4: Supine Shoulder ER with wand 3x10    Time Entry(in minutes):  Manual Therapy Time Entry: " 5  Neuromuscular Re-Education Time Entry: 38  Therapeutic Exercise Time Entry: 12    Assessment & Plan   Assessment: Pt is about 8 weeks and 5 days since surgery. We cont with AAROM intervention. Pt required mod v/c's to perform exercises with proper form, proper muscles activation, and to prevent shoulder shrugs during ex. Pt was able ot achieve 152 deg of R shoulder flexion AAROM and 50 deg of R shoulder ER AAROM. Pt did not have any provocation pain. Pt responded well to manual therapy.  Patient tolerated treatment well   Evaluation/Treatment Tolerance: Patient tolerated treatment well    The patient will continue to benefit from skilled outpatient physical therapy in order to address the deficits listed in the problem list on the initial evaluation, provide patient and family education, and maximize the patients level of independence in the home and community environments.     The patient's spiritual, cultural, and educational needs were considered, and the patient is agreeable to the plan of care and goals.           Plan: Will continue per PT plan of care.    Goals:   Active       Physical Therapy       Physical Therapy Goal       Start:  06/05/25    Expected End:  08/21/25       Short Term Goals: 4 weeks  - Report decreased in pain at worse less than  <   / =  1  /10  to increase tolerance for functional mobility. Goal met 6-19-25  - Pt to improve range of motion by 25% to allow for improved functional mobility to allow for improvement in IADL's. Goal not met 6-19-25  - Increased RUE MMT 1/2 grade to increase tolerance for ADL and work activities. Goal not met 6-19-25  - Pt to report be conscious of impaired sitting and standing posture daily to decrease pain. Goal met 6-19-25  - Pt to tolerate HEP to improve ROM and independence with ADL's. Goal met 6-19-25    Long Term Goals: 8 weeks  - Report decreased in pain at worse less than  <   / =  0  /10  to increase tolerance for functional mobility. Appropriate  and ongoing  - Patient will demonstrate improved overall function per FOTO Survey to 60% score or less. Appropriate and ongoing  - Increased RUE MMT 1 grade to increase tolerance for ADL and work activities. Appropriate and ongoing  - Pt to report and demonstrate proper posture in standing and sitting to decrease pain. Appropriate and ongoing  - Pt to be Independent with HEP to improve ROM and independence with ADL's. Appropriate and ongoing  - Pt to improve range of motion by 75% to allow for improved functional mobility to allow for improvement in IADL's. Appropriate and ongoing               Ollie To, PTA

## 2025-07-21 ENCOUNTER — CLINICAL SUPPORT (OUTPATIENT)
Dept: REHABILITATION | Facility: HOSPITAL | Age: 75
End: 2025-07-21
Payer: MEDICARE

## 2025-07-21 DIAGNOSIS — R29.898 SHOULDER WEAKNESS: ICD-10-CM

## 2025-07-21 DIAGNOSIS — M25.611 IMPAIRED RANGE OF MOTION OF RIGHT SHOULDER: Primary | ICD-10-CM

## 2025-07-21 PROCEDURE — 97112 NEUROMUSCULAR REEDUCATION: CPT | Mod: HCNC,PN

## 2025-07-21 NOTE — PROGRESS NOTES
"  Outpatient Rehab    Physical Therapy Visit    Patient Name: Meera Britt  MRN: [id number]  YOB: 1950  Encounter Date: 7/21/2025    Therapy Diagnosis:   Encounter Diagnoses   Name Primary?    Impaired range of motion of right shoulder Yes    Shoulder weakness      Physician: Pilo Land*    Physician Orders: Eval and Treat  Medical Diagnosis: Traumatic complete tear of right rotator cuff, initial encounter  Biceps tendinitis of right upper extremity  Surgical Diagnosis: R RCR repair   Surgical Date: 5/16/2025  Days Since Last Surgery: 66    Visit # / Visits Authorized:  16 / 20  Insurance Authorization Period: 5/19/2025 to 8/10/2025  Date of Evaluation: 5/20/2025  Plan of Care Certification: 6/5/2025 to 8/21/2025      PT/PTA:     Number of PTA visits since last PT visit:   Time In: 1000   Time Out: 1055  Total Time (in minutes): 55   Total Billable Time (in minutes): 30    FOTO:  Intake Score (%): 14  Survey Score 2 (%): Not applicable for this Episode  Survey Score 3 (%): Not applicable for this Episode    Precautions:  Additional Precautions and Protocol Details: Standard and fall risk; Right RCR on 5/16/2025 POW 8 weeks and 5 days       Subjective   Pt states she is feeling better this morning..  Pain reported as 0/10. DOS: 5-16-25    POW: 9 week and 3 days    Objective            Treatment:  Therapeutic Exercise  TE 1: AAROM Table walk back flexion [id number]" hold  TE 2: SEated table slide AAROM 3x10 flexion and abd  TE 3: Pulleys flexion and abd  3 min  TE 4: pendulums lateral/fwd,bkwd/ [place] CW,CCW 90 sec each (required relax shoulder and passively move when moving body)  TE 5: .  TE 6: .  Manual Therapy  MT 1: manual PROM GH (Flexion and ER)  Balance/Neuromuscular Re-Education  NMR 1: supine shoulder flexion 1# wand AAROM +3x10  NMR 2: supine chest press 1# wand AAROM +3x10  NMR 3: supine seratus punch 1# wand AAROM +3x10  NMR 4: Supine Shoulder ER with wand 3x10  NMR 5: Standing " shoulder extension RTB 3x10  NMR 6: Standing scap retraction TRB 3x10    Time Entry(in minutes):  Manual Therapy Time Entry: 5  Neuromuscular Re-Education Time Entry: 38  Therapeutic Exercise Time Entry: 12    Assessment & Plan   Assessment: Pt is about 9 weeks and 3 days since surgery. We cont with AAROM intervention. Pt required min v/c's to perform exercises with proper form, proper muscles activation, and to prevent shoulder shrugs during ex. Progression of exercises to improve R shoulder mobility. Pt was able ot achieve 155 deg of R shoulder flexion AAROM and 54 deg of R shoulder ER AAROM. Pt did not have any provocation pain. Pt responded well to manual therapy.  Patient tolerated treatment well        The patient will continue to benefit from skilled outpatient physical therapy in order to address the deficits listed in the problem list on the initial evaluation, provide patient and family education, and maximize the patients level of independence in the home and community environments.     The patient's spiritual, cultural, and educational needs were considered, and the patient is agreeable to the plan of care and goals.           Plan: Will continue per PT plan of care.    Goals:   Active       Physical Therapy       Physical Therapy Goal       Start:  06/05/25    Expected End:  08/21/25       Short Term Goals: 4 weeks  - Report decreased in pain at worse less than  <   / =  1  /10  to increase tolerance for functional mobility. Goal met 6-19-25  - Pt to improve range of motion by 25% to allow for improved functional mobility to allow for improvement in IADL's. Goal not met 6-19-25  - Increased RUE MMT 1/2 grade to increase tolerance for ADL and work activities. Goal not met 6-19-25  - Pt to report be conscious of impaired sitting and standing posture daily to decrease pain. Goal met 6-19-25  - Pt to tolerate HEP to improve ROM and independence with ADL's. Goal met 6-19-25    Long Term Goals: 8 weeks  -  Report decreased in pain at worse less than  <   / =  0  /10  to increase tolerance for functional mobility. Appropriate and ongoing  - Patient will demonstrate improved overall function per FOTO Survey to 60% score or less. Appropriate and ongoing  - Increased RUE MMT 1 grade to increase tolerance for ADL and work activities. Appropriate and ongoing  - Pt to report and demonstrate proper posture in standing and sitting to decrease pain. Appropriate and ongoing  - Pt to be Independent with HEP to improve ROM and independence with ADL's. Appropriate and ongoing  - Pt to improve range of motion by 75% to allow for improved functional mobility to allow for improvement in IADL's. Appropriate and ongoing               Mg Torres, PT

## 2025-07-28 ENCOUNTER — CLINICAL SUPPORT (OUTPATIENT)
Dept: REHABILITATION | Facility: HOSPITAL | Age: 75
End: 2025-07-28
Payer: MEDICARE

## 2025-07-28 DIAGNOSIS — M25.611 IMPAIRED RANGE OF MOTION OF RIGHT SHOULDER: Primary | ICD-10-CM

## 2025-07-28 DIAGNOSIS — R29.898 SHOULDER WEAKNESS: ICD-10-CM

## 2025-07-28 PROCEDURE — 97112 NEUROMUSCULAR REEDUCATION: CPT | Mod: HCNC,PN

## 2025-07-28 NOTE — PROGRESS NOTES
"  Outpatient Rehab    Physical Therapy Visit    Patient Name: Meera Britt  MRN: [id number]  YOB: 1950  Encounter Date: 7/28/2025    Therapy Diagnosis:   Encounter Diagnoses   Name Primary?    Impaired range of motion of right shoulder Yes    Shoulder weakness      Physician: Pilo Land*    Physician Orders: Eval and Treat  Medical Diagnosis: Traumatic complete tear of right rotator cuff, initial encounter  Biceps tendinitis of right upper extremity  Surgical Diagnosis: R RCR repair   Surgical Date: 5/16/2025  Days Since Last Surgery: 73    Visit # / Visits Authorized:  17 / 20  Insurance Authorization Period: 5/19/2025 to 8/10/2025  Date of Evaluation: 5/20/2025  Plan of Care Certification: 6/5/2025 to 8/21/2025      PT/PTA:     Number of PTA visits since last PT visit:   Time In: 1000   Time Out: 1055  Total Time (in minutes): 55   Total Billable Time (in minutes): 10    FOTO:  Intake Score (%): 14  Survey Score 2 (%): Not applicable for this Episode  Survey Score 3 (%): Not applicable for this Episode    Precautions:  Additional Precautions and Protocol Details: Standard and fall risk; Right RCR on 5/16/2025 POW 8 weeks and 5 days       Subjective   Pt states she is doing well. She just has minor swollen int he shoulder due to being more active..  Pain reported as 0/10. DOS: 5-16-25    POW: 10 week and 3 days    Objective            Treatment:  Therapeutic Exercise  TE 1: AAROM Table walk back flexion [id number]" hold  TE 2: SEated table slide AAROM 3x10 flexion and abd  TE 3: Pulleys flexion and abd  3 min  TE 4: pendulums lateral/fwd,bkwd/ [place] CW,CCW 90 sec each (required relax shoulder and passively move when moving body)  Manual Therapy  MT 1: manual PROM GH (Flexion and ER)  Balance/Neuromuscular Re-Education  NMR 1: supine shoulder flexion 1# wand AAROM +3x10  NMR 2: supine chest press 1# wand AAROM +3x10  NMR 3: supine seratus punch 1# wand AAROM +3x10  NMR 4: Supine Shoulder " ER with wand 3x10  NMR 5: Standing shoulder extension RTB 3x10  NMR 6: Standing scap retraction TRB 3x10    Time Entry(in minutes):  Neuromuscular Re-Education Time Entry: 10    Assessment & Plan   Assessment: Pt is about 10 weeks and 3 days since surgery. We cont with AAROM intervention. Pt still doing well in therapy. Pt required min v/c's to perform exercises with proper form, proper muscles activation, and to prevent shoulder shrugs during ex. Progression of exercises to improve R shoulder mobility. Pt was able ot achieve 160 deg of R shoulder flexion AAROM and 60 deg of R shoulder ER AAROM. Pt did not have any provocation pain. Pt responded well to manual therapy, but she just demonstrated minor tightness in the end of range of motion during shoulder flexion and ER.  Patient tolerated treatment well   Evaluation/Treatment Tolerance: Patient tolerated treatment well    The patient will continue to benefit from skilled outpatient physical therapy in order to address the deficits listed in the problem list on the initial evaluation, provide patient and family education, and maximize the patients level of independence in the home and community environments.     The patient's spiritual, cultural, and educational needs were considered, and the patient is agreeable to the plan of care and goals.           Plan: Will continue per PT plan of care.    Goals:   Active       Physical Therapy       Physical Therapy Goal       Start:  06/05/25    Expected End:  08/21/25       Short Term Goals: 4 weeks  - Report decreased in pain at worse less than  <   / =  1  /10  to increase tolerance for functional mobility. Goal met 6-19-25  - Pt to improve range of motion by 25% to allow for improved functional mobility to allow for improvement in IADL's. Goal not met 6-19-25  - Increased RUE MMT 1/2 grade to increase tolerance for ADL and work activities. Goal not met 6-19-25  - Pt to report be conscious of impaired sitting and  standing posture daily to decrease pain. Goal met 6-19-25  - Pt to tolerate HEP to improve ROM and independence with ADL's. Goal met 6-19-25    Long Term Goals: 8 weeks  - Report decreased in pain at worse less than  <   / =  0  /10  to increase tolerance for functional mobility. Appropriate and ongoing  - Patient will demonstrate improved overall function per FOTO Survey to 60% score or less. Appropriate and ongoing  - Increased RUE MMT 1 grade to increase tolerance for ADL and work activities. Appropriate and ongoing  - Pt to report and demonstrate proper posture in standing and sitting to decrease pain. Appropriate and ongoing  - Pt to be Independent with HEP to improve ROM and independence with ADL's. Appropriate and ongoing  - Pt to improve range of motion by 75% to allow for improved functional mobility to allow for improvement in IADL's. Appropriate and ongoing               Mg Torres, PT

## 2025-08-04 ENCOUNTER — CLINICAL SUPPORT (OUTPATIENT)
Dept: REHABILITATION | Facility: HOSPITAL | Age: 75
End: 2025-08-04
Payer: MEDICARE

## 2025-08-04 DIAGNOSIS — R29.898 SHOULDER WEAKNESS: ICD-10-CM

## 2025-08-04 DIAGNOSIS — M25.611 IMPAIRED RANGE OF MOTION OF RIGHT SHOULDER: Primary | ICD-10-CM

## 2025-08-04 PROCEDURE — 97112 NEUROMUSCULAR REEDUCATION: CPT | Mod: HCNC,PN

## 2025-08-04 NOTE — PROGRESS NOTES
Outpatient Rehab    Physical Therapy Visit    Patient Name: Meera Britt  MRN: [id number]  YOB: 1950  Encounter Date: 8/4/2025    Therapy Diagnosis:   Encounter Diagnoses   Name Primary?    Impaired range of motion of right shoulder Yes    Shoulder weakness      Physician: Pilo Land*    Physician Orders: Eval and Treat  Medical Diagnosis: Traumatic complete tear of right rotator cuff, initial encounter  Biceps tendinitis of right upper extremity  Surgical Diagnosis: R RCR repair   Surgical Date: 5/16/2025  Days Since Last Surgery: 80    Visit # / Visits Authorized:  18 / 30  Insurance Authorization Period: 5/19/2025 to 12/31/2025  Date of Evaluation: 5/20/2025  Plan of Care Certification: 6/5/2025 to 8/21/2025      PT/PTA:     Number of PTA visits since last PT visit:   Time In: 1000   Time Out: 1055  Total Time (in minutes): 55   Total Billable Time (in minutes): 10    FOTO:  Intake Score (%): 14  Survey Score 2 (%): Not applicable for this Episode  Survey Score 3 (%): Not applicable for this Episode    Precautions:  Additional Precautions and Protocol Details: Standard and fall risk; Right RCR on 5/16/2025 POW 8 weeks and 5 days       Subjective   Pt states she is doing well. No issues..  Pain reported as 0/10. DOS: 5-16-25    POW: 11 week and 3 days    Objective            Treatment:  Therapeutic Exercise  TE 1: Wall slide 30x  TE 3: Pulleys flexion and abd  3 min  TE 4: pendulums lateral/fwd,bkwd/ [place] CW,CCW 90 sec each (required relax shoulder and passively move when moving body)  Manual Therapy  MT 1: manual PROM GH (Flexion and ER)  Balance/Neuromuscular Re-Education  NMR 1: supine shoulder flexion 1# wand AAROM +3x10  NMR 2: supine chest press 1# wand AAROM +3x10  NMR 3: supine seratus punch 1# wand AAROM +3x10  NMR 4: Supine Shoulder ER with wand 3x10  NMR 5: Standing shoulder extension RTB 3x10  NMR 6: Standing scap retraction RTB 3x10  NMR 7: Shoulder isometric 4-ways  3x10 hold 5 sec    Time Entry(in minutes):  Neuromuscular Re-Education Time Entry: 10    Assessment & Plan   Assessment: Pt is about 11 weeks and 3 days since surgery. We cont with AAROM intervention. Addition of shoulder isometric 4-ways today. Pt still doing well in therapy. Pt required min v/c's to perform exercises with proper form, proper muscles activation, and to prevent shoulder shrugs during ex. Progression of exercises to improve R shoulder mobility. Pt has some stiffness of R shoulder ER.  Pt did not have any provocation pain. Pt responded well to manual therapy, but she just demonstrated minor tightness in the end of range of motion during shoulder flexion and ER.  Patient tolerated treatment well   Evaluation/Treatment Tolerance: Patient tolerated treatment well    The patient will continue to benefit from skilled outpatient physical therapy in order to address the deficits listed in the problem list on the initial evaluation, provide patient and family education, and maximize the patients level of independence in the home and community environments.     The patient's spiritual, cultural, and educational needs were considered, and the patient is agreeable to the plan of care and goals.           Plan: Will continue per PT plan of care.    Goals:   Active       Physical Therapy       Physical Therapy Goal       Start:  06/05/25    Expected End:  08/21/25       Short Term Goals: 4 weeks  - Report decreased in pain at worse less than  <   / =  1  /10  to increase tolerance for functional mobility. Goal met 6-19-25  - Pt to improve range of motion by 25% to allow for improved functional mobility to allow for improvement in IADL's. Goal not met 6-19-25  - Increased RUE MMT 1/2 grade to increase tolerance for ADL and work activities. Goal not met 6-19-25  - Pt to report be conscious of impaired sitting and standing posture daily to decrease pain. Goal met 6-19-25  - Pt to tolerate HEP to improve ROM and  independence with ADL's. Goal met 6-19-25    Long Term Goals: 8 weeks  - Report decreased in pain at worse less than  <   / =  0  /10  to increase tolerance for functional mobility. Appropriate and ongoing  - Patient will demonstrate improved overall function per FOTO Survey to 60% score or less. Appropriate and ongoing  - Increased RUE MMT 1 grade to increase tolerance for ADL and work activities. Appropriate and ongoing  - Pt to report and demonstrate proper posture in standing and sitting to decrease pain. Appropriate and ongoing  - Pt to be Independent with HEP to improve ROM and independence with ADL's. Appropriate and ongoing  - Pt to improve range of motion by 75% to allow for improved functional mobility to allow for improvement in IADL's. Appropriate and ongoing               Mg Torres, PT

## 2025-08-06 ENCOUNTER — CLINICAL SUPPORT (OUTPATIENT)
Dept: REHABILITATION | Facility: HOSPITAL | Age: 75
End: 2025-08-06
Payer: MEDICARE

## 2025-08-06 DIAGNOSIS — R29.898 SHOULDER WEAKNESS: ICD-10-CM

## 2025-08-06 DIAGNOSIS — M25.611 IMPAIRED RANGE OF MOTION OF RIGHT SHOULDER: Primary | ICD-10-CM

## 2025-08-06 PROCEDURE — 97112 NEUROMUSCULAR REEDUCATION: CPT | Mod: HCNC,PN,CQ

## 2025-08-11 ENCOUNTER — CLINICAL SUPPORT (OUTPATIENT)
Dept: REHABILITATION | Facility: HOSPITAL | Age: 75
End: 2025-08-11
Payer: MEDICARE

## 2025-08-11 DIAGNOSIS — R29.898 SHOULDER WEAKNESS: ICD-10-CM

## 2025-08-11 DIAGNOSIS — M25.611 IMPAIRED RANGE OF MOTION OF RIGHT SHOULDER: Primary | ICD-10-CM

## 2025-08-11 PROCEDURE — 97112 NEUROMUSCULAR REEDUCATION: CPT | Mod: HCNC,PN

## 2025-08-13 ENCOUNTER — CLINICAL SUPPORT (OUTPATIENT)
Dept: REHABILITATION | Facility: HOSPITAL | Age: 75
End: 2025-08-13
Payer: MEDICARE

## 2025-08-13 DIAGNOSIS — M25.611 IMPAIRED RANGE OF MOTION OF RIGHT SHOULDER: Primary | ICD-10-CM

## 2025-08-13 DIAGNOSIS — R29.898 SHOULDER WEAKNESS: ICD-10-CM

## 2025-08-13 PROCEDURE — 97112 NEUROMUSCULAR REEDUCATION: CPT | Mod: HCNC,PN,CQ

## 2025-08-18 ENCOUNTER — CLINICAL SUPPORT (OUTPATIENT)
Dept: REHABILITATION | Facility: HOSPITAL | Age: 75
End: 2025-08-18
Payer: MEDICARE

## 2025-08-18 DIAGNOSIS — R29.898 SHOULDER WEAKNESS: ICD-10-CM

## 2025-08-18 DIAGNOSIS — M25.611 IMPAIRED RANGE OF MOTION OF RIGHT SHOULDER: Primary | ICD-10-CM

## 2025-08-18 PROCEDURE — 97112 NEUROMUSCULAR REEDUCATION: CPT | Mod: HCNC,PN,CQ

## 2025-08-18 PROCEDURE — 97110 THERAPEUTIC EXERCISES: CPT | Mod: HCNC,PN,CQ

## 2025-08-20 ENCOUNTER — CLINICAL SUPPORT (OUTPATIENT)
Dept: REHABILITATION | Facility: HOSPITAL | Age: 75
End: 2025-08-20
Payer: MEDICARE

## 2025-08-20 DIAGNOSIS — R29.898 SHOULDER WEAKNESS: ICD-10-CM

## 2025-08-20 DIAGNOSIS — M25.611 IMPAIRED RANGE OF MOTION OF RIGHT SHOULDER: Primary | ICD-10-CM

## 2025-08-20 PROCEDURE — 97112 NEUROMUSCULAR REEDUCATION: CPT | Mod: HCNC,PN,CQ

## 2025-08-22 ENCOUNTER — OFFICE VISIT (OUTPATIENT)
Dept: SPORTS MEDICINE | Facility: CLINIC | Age: 75
End: 2025-08-22
Payer: MEDICARE

## 2025-08-22 VITALS
SYSTOLIC BLOOD PRESSURE: 136 MMHG | WEIGHT: 141.13 LBS | HEART RATE: 64 BPM | BODY MASS INDEX: 25.97 KG/M2 | HEIGHT: 62 IN | DIASTOLIC BLOOD PRESSURE: 86 MMHG

## 2025-08-22 DIAGNOSIS — S46.011D TRAUMATIC COMPLETE TEAR OF RIGHT ROTATOR CUFF, SUBSEQUENT ENCOUNTER: ICD-10-CM

## 2025-08-22 DIAGNOSIS — Z98.890 S/P RIGHT ROTATOR CUFF REPAIR: Primary | ICD-10-CM

## 2025-08-22 PROCEDURE — 99999 PR PBB SHADOW E&M-EST. PATIENT-LVL III: CPT | Mod: PBBFAC,HCNC,, | Performed by: STUDENT IN AN ORGANIZED HEALTH CARE EDUCATION/TRAINING PROGRAM

## 2025-08-25 ENCOUNTER — CLINICAL SUPPORT (OUTPATIENT)
Dept: REHABILITATION | Facility: HOSPITAL | Age: 75
End: 2025-08-25
Payer: MEDICARE

## 2025-08-25 DIAGNOSIS — M25.611 IMPAIRED RANGE OF MOTION OF RIGHT SHOULDER: Primary | ICD-10-CM

## 2025-08-25 DIAGNOSIS — R29.898 SHOULDER WEAKNESS: ICD-10-CM

## 2025-08-25 PROCEDURE — 97112 NEUROMUSCULAR REEDUCATION: CPT | Mod: HCNC,PN

## 2025-08-27 ENCOUNTER — CLINICAL SUPPORT (OUTPATIENT)
Dept: REHABILITATION | Facility: HOSPITAL | Age: 75
End: 2025-08-27
Payer: MEDICARE

## 2025-08-27 DIAGNOSIS — M25.611 IMPAIRED RANGE OF MOTION OF RIGHT SHOULDER: Primary | ICD-10-CM

## 2025-08-27 DIAGNOSIS — R29.898 SHOULDER WEAKNESS: ICD-10-CM

## 2025-08-27 PROCEDURE — 97112 NEUROMUSCULAR REEDUCATION: CPT | Mod: HCNC,PN

## 2025-09-05 ENCOUNTER — OFFICE VISIT (OUTPATIENT)
Dept: FAMILY MEDICINE | Facility: CLINIC | Age: 75
End: 2025-09-05
Payer: MEDICARE

## 2025-09-05 VITALS
BODY MASS INDEX: 25.76 KG/M2 | OXYGEN SATURATION: 97 % | DIASTOLIC BLOOD PRESSURE: 78 MMHG | TEMPERATURE: 98 F | HEIGHT: 62 IN | WEIGHT: 140 LBS | HEART RATE: 77 BPM | SYSTOLIC BLOOD PRESSURE: 124 MMHG

## 2025-09-05 DIAGNOSIS — E78.00 PURE HYPERCHOLESTEROLEMIA: ICD-10-CM

## 2025-09-05 DIAGNOSIS — K21.00 GASTROESOPHAGEAL REFLUX DISEASE WITH ESOPHAGITIS, UNSPECIFIED WHETHER HEMORRHAGE: ICD-10-CM

## 2025-09-05 DIAGNOSIS — E55.9 VITAMIN D DEFICIENCY: ICD-10-CM

## 2025-09-05 DIAGNOSIS — Z00.00 ANNUAL PHYSICAL EXAM: Primary | ICD-10-CM

## 2025-09-05 DIAGNOSIS — E78.49 OTHER HYPERLIPIDEMIA: ICD-10-CM

## 2025-09-05 DIAGNOSIS — E05.90 HYPERTHYROIDISM: ICD-10-CM

## 2025-09-05 PROCEDURE — 99999 PR PBB SHADOW E&M-EST. PATIENT-LVL IV: CPT | Mod: PBBFAC,HCNC,, | Performed by: FAMILY MEDICINE

## 2025-09-05 RX ORDER — FENOFIBRATE 160 MG/1
160 TABLET ORAL DAILY
Qty: 90 TABLET | Refills: 3 | Status: SHIPPED | OUTPATIENT
Start: 2025-09-05

## (undated) DEVICE — SUT ETHILON 3-0 PS2 18 BLK

## (undated) DEVICE — DRESSING AQUACEL AG SILVER 4X4

## (undated) DEVICE — UNDERGLOVES BIOGEL PI SIZE 8

## (undated) DEVICE — BLADE POWERASP 4.0MMX13CM

## (undated) DEVICE — TUBING SUC UNIV W/CONN 12FT

## (undated) DEVICE — GEMINI ARTHREX SR8

## (undated) DEVICE — COVER LIGHT HANDLE 80/CA

## (undated) DEVICE — PAD DERMAPROX THCK 11X15X1IN

## (undated) DEVICE — SHAVER SYS 5.5 ULRAFRR

## (undated) DEVICE — GLOVE PROTEXIS LIGHT BROWN 8.5

## (undated) DEVICE — KIT TRIMANO CHIN

## (undated) DEVICE — GOWN ECLIPSE REINF L4 XLNG XXL

## (undated) DEVICE — GLOVE SENSICARE PI GRN 7

## (undated) DEVICE — CANNULA TWIST IN 7MM X 7CM

## (undated) DEVICE — UNDERGLOVES BIOGEL PI SIZE 8.5

## (undated) DEVICE — Device

## (undated) DEVICE — DRAPE TOP 53X102IN

## (undated) DEVICE — SOL NACL IRR 1000ML BTL

## (undated) DEVICE — GLOVE SENSICARE PI GRN 8

## (undated) DEVICE — PROBE ARTHO ENERGY 90 DEG

## (undated) DEVICE — SOL NACL IRR 3000ML

## (undated) DEVICE — COVER CAMERA OPERATING ROOM

## (undated) DEVICE — NDL SCORPION HD MEGALOADER

## (undated) DEVICE — WRAP SHLDR HIP ACCU THRM PACK

## (undated) DEVICE — ELECTRODE REM PLYHSV RETURN 9

## (undated) DEVICE — GLOVE SENSICARE PI MICRO 7

## (undated) DEVICE — DRAPE STERI INSTRUMENT 1018

## (undated) DEVICE — BLADE VORTEX SHAVER 4.5MMX13CM

## (undated) DEVICE — GOWN ECLIPSE REINF LV4 XLNG XL

## (undated) DEVICE — SUT MONOCRYL 3-0 PS-2 UND

## (undated) DEVICE — KIT TRIMANO

## (undated) DEVICE — MAT SURGICAL ECOSUCTIONER

## (undated) DEVICE — DRAPE SURG W/TWL 17 5/8X23

## (undated) DEVICE — SUT FIBERLINK TAPE BLU WHT 1.3

## (undated) DEVICE — BLADE SHAVER LANZA 4.2X13CM

## (undated) DEVICE — SUT SUTURETAPE TIGERLINK 1.3MM

## (undated) DEVICE — DRAPE STERI U-SHAPED 47X51IN

## (undated) DEVICE — PAD ELECTRODE STER 1.5X3

## (undated) DEVICE — GLOVE SENSICARE PI ALOE 7.5

## (undated) DEVICE — BNDG COFLEX FOAM LF2 ST 6X5YD